# Patient Record
Sex: FEMALE | Race: OTHER | HISPANIC OR LATINO | URBAN - METROPOLITAN AREA
[De-identification: names, ages, dates, MRNs, and addresses within clinical notes are randomized per-mention and may not be internally consistent; named-entity substitution may affect disease eponyms.]

---

## 2017-01-03 ENCOUNTER — EMERGENCY (EMERGENCY)
Facility: HOSPITAL | Age: 42
LOS: 1 days | Discharge: HOME | End: 2017-01-03
Admitting: EMERGENCY MEDICINE
Payer: COMMERCIAL

## 2017-01-03 VITALS
SYSTOLIC BLOOD PRESSURE: 170 MMHG | DIASTOLIC BLOOD PRESSURE: 106 MMHG | HEART RATE: 93 BPM | OXYGEN SATURATION: 96 % | TEMPERATURE: 98 F | RESPIRATION RATE: 16 BRPM

## 2017-01-03 PROCEDURE — 99284 EMERGENCY DEPT VISIT MOD MDM: CPT | Mod: 25

## 2017-01-03 PROCEDURE — 93010 ELECTROCARDIOGRAM REPORT: CPT

## 2017-01-03 RX ORDER — IBUPROFEN 200 MG
1 TABLET ORAL
Qty: 15 | Refills: 0
Start: 2017-01-03 | End: 2017-01-08

## 2017-01-03 RX ORDER — CYCLOBENZAPRINE HYDROCHLORIDE 10 MG/1
1 TABLET, FILM COATED ORAL
Qty: 9 | Refills: 0
Start: 2017-01-03 | End: 2017-01-06

## 2017-01-03 NOTE — ED PROVIDER NOTE - MUSCULOSKELETAL [-], MLM
no swelling/no weakness/no neck pain/no sensory deficits/no gout/no stiffness/no back pain/no joint pain

## 2017-01-03 NOTE — ED PROVIDER NOTE - UPPER EXTREMITY EXAM, LEFT
TENDERNESS/L shoulder: TTP @ anterior aspect of shoulder. FROM, NVI, strength 5/5 b/l. No swelling, instability, discoloration, deformity

## 2017-01-03 NOTE — ED PROVIDER NOTE - OBJECTIVE STATEMENT
40 y/o female with PMH of HTN reports to the ER for left arm pain x 5-6 days. Patient states the pain first started about a week ago, after she slept on her left side. She says when she woke up, she had a throbbing pain at the top of her left shoulder which spreads into her left breast and radiates down the anterior aspect of her left arm. She describes the quality of pain as an intermittent throbbing and rates the pain as 5/10 severity. She says that nothing makes the pain worse, but hot showers and tylenol make the pain better. She says she self treated with one dose of Tylenol and experienced mild relief. She says this morning when she woke up at 5 am the pain returned and she became frightened, so she decided to come to the ER. She says the pain makes her very anxious and she is worried she might be having a heart attack or stroke. She admits to left arm pain. She denies chest pain, heart palpitations, heart murmurs, irregular rhythms, SOB, pain with respiration, orthopnea, paroxysmal nocturnal dyspnea, wheezing, cough, cyanosis, pallor, nipple pain, dischage/retraction/discoloration. 42 y/o female with PMH of HTN reports to the ER for left arm pain x 5-6 days. Patient states the pain first started about a week ago, after she slept on her left side. She says when she woke up, she had a throbbing pain at the top of her left shoulder which spreads into her left breast and radiates down the anterior aspect of her left arm. She describes the quality of pain as an intermittent throbbing and rates the pain as 5/10 severity. She says that nothing makes the pain worse, but hot showers and tylenol make the pain better. She says she self treated with one dose of Tylenol and experienced mild relief. She says this morning when she woke up at 5 am the pain returned and she became frightened, so she decided to come to the ER. She says the pain makes her very anxious and she is worried she might be having a heart attack or stroke. She admits to left arm pain. She denies chest pain, heart palpitations, heart murmurs, irregular rhythms, SOB, pain with respiration, orthopnea, paroxysmal nocturnal dyspnea, wheezing, cough, cyanosis, pallor, nipple pain, dischage/retraction/discoloration. Pt has not yet taken Losartan.

## 2017-01-03 NOTE — ED PROVIDER NOTE - CHPI ED SYMPTOMS NEG
no deformity/no abrasion/no joint swelling/no bruising no deformity/no abrasion/no numbness/no joint swelling/no back pain/no bruising/no inflammation

## 2017-01-03 NOTE — ED PROVIDER NOTE - MEDICAL DECISION MAKING DETAILS
42 yo F w/ 5 days L shoulder pain, intermittent. DDx: Cardiac etiology vs Musculoskeletal pain- most likely 2/2 Pt has normal EKG, heart score 1, pain reproducible with palpation and began after positional incident. Currently asymptomatic.

## 2017-01-03 NOTE — ED ADULT TRIAGE NOTE - CHIEF COMPLAINT QUOTE
Pt c/o intermittent L arm pain x1 week after sleeping on her couch. Pt states 3 days ago pain began radiating to L breast and chest area.  PMH: HTN - did not take meds today

## 2017-05-03 ENCOUNTER — EMERGENCY (EMERGENCY)
Facility: HOSPITAL | Age: 42
LOS: 1 days | Discharge: HOME | End: 2017-05-03
Attending: EMERGENCY MEDICINE | Admitting: EMERGENCY MEDICINE
Payer: COMMERCIAL

## 2017-05-03 VITALS
OXYGEN SATURATION: 96 % | RESPIRATION RATE: 16 BRPM | SYSTOLIC BLOOD PRESSURE: 154 MMHG | DIASTOLIC BLOOD PRESSURE: 104 MMHG | TEMPERATURE: 98 F | HEART RATE: 80 BPM

## 2017-05-03 VITALS
SYSTOLIC BLOOD PRESSURE: 190 MMHG | HEART RATE: 63 BPM | DIASTOLIC BLOOD PRESSURE: 100 MMHG | OXYGEN SATURATION: 100 % | RESPIRATION RATE: 17 BRPM

## 2017-05-03 LAB
ALBUMIN SERPL ELPH-MCNC: 4.3 G/DL — SIGNIFICANT CHANGE UP (ref 3.3–5)
ALP SERPL-CCNC: 80 U/L — SIGNIFICANT CHANGE UP (ref 40–120)
ALT FLD-CCNC: 19 U/L — SIGNIFICANT CHANGE UP (ref 4–33)
APTT BLD: 35 SEC — SIGNIFICANT CHANGE UP (ref 27.5–37.4)
AST SERPL-CCNC: 28 U/L — SIGNIFICANT CHANGE UP (ref 4–32)
BASOPHILS # BLD AUTO: 0.06 K/UL — SIGNIFICANT CHANGE UP (ref 0–0.2)
BASOPHILS NFR BLD AUTO: 0.7 % — SIGNIFICANT CHANGE UP (ref 0–2)
BILIRUB SERPL-MCNC: 0.4 MG/DL — SIGNIFICANT CHANGE UP (ref 0.2–1.2)
BUN SERPL-MCNC: 13 MG/DL — SIGNIFICANT CHANGE UP (ref 7–23)
CALCIUM SERPL-MCNC: 9.3 MG/DL — SIGNIFICANT CHANGE UP (ref 8.4–10.5)
CHLORIDE SERPL-SCNC: 99 MMOL/L — SIGNIFICANT CHANGE UP (ref 98–107)
CK MB BLD-MCNC: 1.76 NG/ML — SIGNIFICANT CHANGE UP (ref 1–4.7)
CK MB BLD-MCNC: 2 NG/ML — SIGNIFICANT CHANGE UP (ref 1–4.7)
CK MB BLD-MCNC: SIGNIFICANT CHANGE UP (ref 0–2.5)
CK SERPL-CCNC: 122 U/L — SIGNIFICANT CHANGE UP (ref 25–170)
CK SERPL-CCNC: 91 U/L — SIGNIFICANT CHANGE UP (ref 25–170)
CO2 SERPL-SCNC: 25 MMOL/L — SIGNIFICANT CHANGE UP (ref 22–31)
CREAT SERPL-MCNC: 0.84 MG/DL — SIGNIFICANT CHANGE UP (ref 0.5–1.3)
EOSINOPHIL # BLD AUTO: 0.16 K/UL — SIGNIFICANT CHANGE UP (ref 0–0.5)
EOSINOPHIL NFR BLD AUTO: 1.9 % — SIGNIFICANT CHANGE UP (ref 0–6)
GLUCOSE SERPL-MCNC: 96 MG/DL — SIGNIFICANT CHANGE UP (ref 70–99)
HCG SERPL-ACNC: < 5 MIU/ML — SIGNIFICANT CHANGE UP
HCT VFR BLD CALC: 41.4 % — SIGNIFICANT CHANGE UP (ref 34.5–45)
HGB BLD-MCNC: 13.4 G/DL — SIGNIFICANT CHANGE UP (ref 11.5–15.5)
IMM GRANULOCYTES NFR BLD AUTO: 0.2 % — SIGNIFICANT CHANGE UP (ref 0–1.5)
INR BLD: 1.03 — SIGNIFICANT CHANGE UP (ref 0.88–1.17)
LYMPHOCYTES # BLD AUTO: 1.07 K/UL — SIGNIFICANT CHANGE UP (ref 1–3.3)
LYMPHOCYTES # BLD AUTO: 12.5 % — LOW (ref 13–44)
MCHC RBC-ENTMCNC: 27.4 PG — SIGNIFICANT CHANGE UP (ref 27–34)
MCHC RBC-ENTMCNC: 32.4 % — SIGNIFICANT CHANGE UP (ref 32–36)
MCV RBC AUTO: 84.7 FL — SIGNIFICANT CHANGE UP (ref 80–100)
MONOCYTES # BLD AUTO: 0.51 K/UL — SIGNIFICANT CHANGE UP (ref 0–0.9)
MONOCYTES NFR BLD AUTO: 6 % — SIGNIFICANT CHANGE UP (ref 2–14)
NEUTROPHILS # BLD AUTO: 6.74 K/UL — SIGNIFICANT CHANGE UP (ref 1.8–7.4)
NEUTROPHILS NFR BLD AUTO: 78.7 % — HIGH (ref 43–77)
PLATELET # BLD AUTO: 362 K/UL — SIGNIFICANT CHANGE UP (ref 150–400)
PMV BLD: 10 FL — SIGNIFICANT CHANGE UP (ref 7–13)
POTASSIUM SERPL-MCNC: 4.1 MMOL/L — SIGNIFICANT CHANGE UP (ref 3.5–5.3)
POTASSIUM SERPL-SCNC: 4.1 MMOL/L — SIGNIFICANT CHANGE UP (ref 3.5–5.3)
PROT SERPL-MCNC: 7.7 G/DL — SIGNIFICANT CHANGE UP (ref 6–8.3)
PROTHROM AB SERPL-ACNC: 11.5 SEC — SIGNIFICANT CHANGE UP (ref 9.8–13.1)
RBC # BLD: 4.89 M/UL — SIGNIFICANT CHANGE UP (ref 3.8–5.2)
RBC # FLD: 13.2 % — SIGNIFICANT CHANGE UP (ref 10.3–14.5)
SODIUM SERPL-SCNC: 137 MMOL/L — SIGNIFICANT CHANGE UP (ref 135–145)
TROPONIN T SERPL-MCNC: < 0.06 NG/ML — SIGNIFICANT CHANGE UP (ref 0–0.06)
TROPONIN T SERPL-MCNC: < 0.06 NG/ML — SIGNIFICANT CHANGE UP (ref 0–0.06)
WBC # BLD: 8.56 K/UL — SIGNIFICANT CHANGE UP (ref 3.8–10.5)
WBC # FLD AUTO: 8.56 K/UL — SIGNIFICANT CHANGE UP (ref 3.8–10.5)

## 2017-05-03 PROCEDURE — 93010 ELECTROCARDIOGRAM REPORT: CPT

## 2017-05-03 PROCEDURE — 71020: CPT | Mod: 26

## 2017-05-03 PROCEDURE — 99285 EMERGENCY DEPT VISIT HI MDM: CPT | Mod: 25

## 2017-05-03 NOTE — ED PROVIDER NOTE - MEDICAL DECISION MAKING DETAILS
40 yo F here with epigastric pain x 7 days. hx GERD. improving with nexium until today which patient felt worse. PLAN: labs, ekg

## 2017-05-03 NOTE — ED ADULT NURSE REASSESSMENT NOTE - NS ED NURSE REASSESS COMMENT FT1
Report received from CELIA Alvarez. Pt A&Ox4, in NAD. Denies pain at this time. Pt awaiting Xray results & repeat Cardiac enzymes. Will continue to monitor closely.

## 2017-05-03 NOTE — ED PROVIDER NOTE - ATTENDING CONTRIBUTION TO CARE
Case of a 40 y/o female patient with past medical history of HTN, GERD here for epigastric pain x 1 week, sent by PMD to r/o ACS. Will do labs, give symptomatic treatment and monitor closely.

## 2017-05-03 NOTE — ED ADULT TRIAGE NOTE - CHIEF COMPLAINT QUOTE
Pt BIBA from home c/o several days of epigastric pain, worse when sitting. Pt denies SOB, denies N/V, states she feels like something will "come up", but nothing did. Saw PMD yesterday and was told she has GERD. Got 162mg ASA and Nitro by EMS. PMH of HTN

## 2017-05-03 NOTE — ED PROVIDER NOTE - PROGRESS NOTE DETAILS
DAVID Núñez: pt remaining comfortable in ED, pending CE #2 and repeat EKG at 4 hour ban. Pt aware of plan. Labs reassuring thus far. Pt will be signed out to overnight team
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
## 2017-05-03 NOTE — ED PROVIDER NOTE - CHPI ED SYMPTOMS NEG
no dysuria/no diarrhea/no nausea/no burning urination/no blood in stool/no vomiting/no abdominal distension/no fever/no hematuria/no chills
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
## 2017-05-03 NOTE — ED PROVIDER NOTE - GASTROINTESTINAL NEGATIVE STATEMENT, MLM
+epigastric pain, abdominal pain, no bloating, no constipation, no diarrhea, no nausea and no vomiting.

## 2017-05-04 PROBLEM — R73.03 PREDIABETES: Chronic | Status: ACTIVE | Noted: 2017-01-03

## 2018-03-27 ENCOUNTER — APPOINTMENT (OUTPATIENT)
Dept: DERMATOLOGY | Facility: CLINIC | Age: 43
End: 2018-03-27
Payer: COMMERCIAL

## 2018-03-27 VITALS
DIASTOLIC BLOOD PRESSURE: 88 MMHG | WEIGHT: 214.38 LBS | SYSTOLIC BLOOD PRESSURE: 160 MMHG | BODY MASS INDEX: 43.22 KG/M2 | HEIGHT: 59 IN

## 2018-03-27 DIAGNOSIS — L85.8 OTHER SPECIFIED EPIDERMAL THICKENING: ICD-10-CM

## 2018-03-27 DIAGNOSIS — L83 ACANTHOSIS NIGRICANS: ICD-10-CM

## 2018-03-27 DIAGNOSIS — L28.0 LICHEN SIMPLEX CHRONICUS: ICD-10-CM

## 2018-03-27 DIAGNOSIS — Z86.79 PERSONAL HISTORY OF OTHER DISEASES OF THE CIRCULATORY SYSTEM: ICD-10-CM

## 2018-03-27 DIAGNOSIS — L81.0 POSTINFLAMMATORY HYPERPIGMENTATION: ICD-10-CM

## 2018-03-27 DIAGNOSIS — L30.9 DERMATITIS, UNSPECIFIED: ICD-10-CM

## 2018-03-27 PROCEDURE — 99203 OFFICE O/P NEW LOW 30 MIN: CPT

## 2018-03-27 RX ORDER — TRIAMCINOLONE ACETONIDE 1 MG/G
0.1 OINTMENT TOPICAL
Qty: 1 | Refills: 1 | Status: ACTIVE | COMMUNITY
Start: 2018-03-27 | End: 1900-01-01

## 2018-03-27 RX ORDER — CALCIPOTRIENE 50 UG/G
0.01 OINTMENT TOPICAL
Refills: 0 | Status: ACTIVE | COMMUNITY

## 2018-07-14 ENCOUNTER — EMERGENCY (EMERGENCY)
Facility: HOSPITAL | Age: 43
LOS: 1 days | Discharge: HOME | End: 2018-07-14
Attending: EMERGENCY MEDICINE | Admitting: EMERGENCY MEDICINE
Payer: COMMERCIAL

## 2018-07-14 VITALS
RESPIRATION RATE: 12 BRPM | DIASTOLIC BLOOD PRESSURE: 109 MMHG | SYSTOLIC BLOOD PRESSURE: 195 MMHG | HEART RATE: 86 BPM | OXYGEN SATURATION: 100 %

## 2018-07-14 VITALS
TEMPERATURE: 98 F | RESPIRATION RATE: 16 BRPM | DIASTOLIC BLOOD PRESSURE: 135 MMHG | HEART RATE: 82 BPM | OXYGEN SATURATION: 99 % | SYSTOLIC BLOOD PRESSURE: 195 MMHG

## 2018-07-14 PROCEDURE — 99284 EMERGENCY DEPT VISIT MOD MDM: CPT

## 2018-07-14 PROCEDURE — 71046 X-RAY EXAM CHEST 2 VIEWS: CPT | Mod: 26

## 2018-07-14 RX ORDER — IBUPROFEN 200 MG
1 TABLET ORAL
Qty: 20 | Refills: 0
Start: 2018-07-14

## 2018-07-14 RX ORDER — IBUPROFEN 200 MG
600 TABLET ORAL ONCE
Qty: 0 | Refills: 0 | Status: COMPLETED | OUTPATIENT
Start: 2018-07-14 | End: 2018-07-14

## 2018-07-14 RX ORDER — CYCLOBENZAPRINE HYDROCHLORIDE 10 MG/1
1 TABLET, FILM COATED ORAL
Qty: 12 | Refills: 0
Start: 2018-07-14

## 2018-07-14 RX ORDER — ACETAMINOPHEN 500 MG
975 TABLET ORAL ONCE
Qty: 0 | Refills: 0 | Status: COMPLETED | OUTPATIENT
Start: 2018-07-14 | End: 2018-07-14

## 2018-07-14 RX ORDER — IBUPROFEN 200 MG
600 TABLET ORAL ONCE
Qty: 0 | Refills: 0 | Status: DISCONTINUED | OUTPATIENT
Start: 2018-07-14 | End: 2018-07-14

## 2018-07-14 RX ADMIN — Medication 600 MILLIGRAM(S): at 20:28

## 2018-07-14 RX ADMIN — Medication 975 MILLIGRAM(S): at 20:27

## 2018-07-14 NOTE — ED PROVIDER NOTE - PHYSICAL EXAMINATION
Gen: AAOx3, non-toxic, tearful during exam, states she is "very anxious"  Head: NCAT  HEENT: EOMI, oral mucosa moist, normal conjunctiva  Lung: CTAB, no respiratory distress, no wheezes/rhonchi/rales B/L, speaking in full sentences  CV: RRR, no murmurs, rubs or gallops  Abd: soft, NTND, no guarding  MSK: no visible deformities  Neuro: No focal sensory or motor deficits, +5/5 upper and lower ext strength, pt ambulating well without assistance  Skin: Warm, well perfused, no rash, no areas of ecchymosis on chest wall or abdomen  Psych: normal affect.   ~Real Ambrose M.D. Resident
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
## 2018-07-14 NOTE — ED PROVIDER NOTE - NS ED ROS FT
GENERAL: No fever or chills, EYES: no change in vision, HEENT: no trouble swallowing or speaking, CARDIAC: +chest discomfort, PULMONARY: no cough or SOB, GI: no abdominal pain, no nausea, no vomiting, no diarrhea or constipation, : No changes in urination, SKIN: no rashes, NEURO: no headache,  MSK: No joint pain ~Real Ambrose M.D. Resident
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
## 2018-11-19 NOTE — ED ADULT TRIAGE NOTE - MEANS OF ARRIVAL
ambulatory GEN: Well appearing, well nourished, awake, alert, oriented to person, place, time/situation and in no apparent distress.  ENT: Airway patent, Nasal mucosa clear. Mouth with normal mucosa. No oropharyngeal swelling or exudates, no neck masses palpated.   EYES: Clear bilaterally.  RESPIRATORY: Breathing comfortably with normal RR. No w/c/r. no resp distress.  CARDIAC: Regular rate and rhythm  ABDOMEN: Soft, nontender, +bowel sounds, no rebound, rigidity, or guarding.  MSK: Range of motion is not limited, no deformities noted.  NEURO: Alert and oriented, no focal deficits.  SKIN: Skin normal color for race, warm, dry and intact. No evidence of rash.  PSYCH: Alert and oriented to person, place, time/situation. normal mood and affect. no apparent risk to self or others.

## 2019-06-17 ENCOUNTER — EMERGENCY (EMERGENCY)
Facility: HOSPITAL | Age: 44
LOS: 1 days | Discharge: HOME | End: 2019-06-17
Attending: EMERGENCY MEDICINE
Payer: COMMERCIAL

## 2019-06-17 VITALS — HEART RATE: 102 BPM

## 2019-06-17 VITALS
HEART RATE: 122 BPM | WEIGHT: 210.1 LBS | SYSTOLIC BLOOD PRESSURE: 180 MMHG | DIASTOLIC BLOOD PRESSURE: 94 MMHG | TEMPERATURE: 102 F | OXYGEN SATURATION: 95 % | RESPIRATION RATE: 22 BRPM | HEIGHT: 58 IN

## 2019-06-17 LAB
ALBUMIN SERPL ELPH-MCNC: 4.6 G/DL — SIGNIFICANT CHANGE UP (ref 3.3–5)
ALP SERPL-CCNC: 89 U/L — SIGNIFICANT CHANGE UP (ref 40–120)
ALT FLD-CCNC: 20 U/L — SIGNIFICANT CHANGE UP (ref 10–45)
ANION GAP SERPL CALC-SCNC: 13 MMOL/L — SIGNIFICANT CHANGE UP (ref 5–17)
APPEARANCE UR: CLEAR — SIGNIFICANT CHANGE UP
APTT BLD: 34 SEC — SIGNIFICANT CHANGE UP (ref 27.5–36.3)
AST SERPL-CCNC: 17 U/L — SIGNIFICANT CHANGE UP (ref 10–40)
BASOPHILS # BLD AUTO: 0.1 K/UL — SIGNIFICANT CHANGE UP (ref 0–0.2)
BASOPHILS NFR BLD AUTO: 0.3 % — SIGNIFICANT CHANGE UP (ref 0–2)
BILIRUB SERPL-MCNC: 0.4 MG/DL — SIGNIFICANT CHANGE UP (ref 0.2–1.2)
BILIRUB UR-MCNC: NEGATIVE — SIGNIFICANT CHANGE UP
BUN SERPL-MCNC: 13 MG/DL — SIGNIFICANT CHANGE UP (ref 7–23)
CALCIUM SERPL-MCNC: 9 MG/DL — SIGNIFICANT CHANGE UP (ref 8.4–10.5)
CHLORIDE SERPL-SCNC: 95 MMOL/L — LOW (ref 96–108)
CO2 SERPL-SCNC: 24 MMOL/L — SIGNIFICANT CHANGE UP (ref 22–31)
COLOR SPEC: SIGNIFICANT CHANGE UP
CREAT SERPL-MCNC: 0.79 MG/DL — SIGNIFICANT CHANGE UP (ref 0.5–1.3)
DIFF PNL FLD: NEGATIVE — SIGNIFICANT CHANGE UP
EOSINOPHIL # BLD AUTO: 0.1 K/UL — SIGNIFICANT CHANGE UP (ref 0–0.5)
EOSINOPHIL NFR BLD AUTO: 0.3 % — SIGNIFICANT CHANGE UP (ref 0–6)
GAS PNL BLDV: SIGNIFICANT CHANGE UP
GLUCOSE SERPL-MCNC: 120 MG/DL — HIGH (ref 70–99)
GLUCOSE UR QL: NEGATIVE — SIGNIFICANT CHANGE UP
HCG UR QL: NEGATIVE — SIGNIFICANT CHANGE UP
HCT VFR BLD CALC: 41.8 % — SIGNIFICANT CHANGE UP (ref 34.5–45)
HGB BLD-MCNC: 14.2 G/DL — SIGNIFICANT CHANGE UP (ref 11.5–15.5)
INR BLD: 1.02 RATIO — SIGNIFICANT CHANGE UP (ref 0.88–1.16)
KETONES UR-MCNC: NEGATIVE — SIGNIFICANT CHANGE UP
LEUKOCYTE ESTERASE UR-ACNC: NEGATIVE — SIGNIFICANT CHANGE UP
LYMPHOCYTES # BLD AUTO: 0.8 K/UL — LOW (ref 1–3.3)
LYMPHOCYTES # BLD AUTO: 4 % — LOW (ref 13–44)
MCHC RBC-ENTMCNC: 28.7 PG — SIGNIFICANT CHANGE UP (ref 27–34)
MCHC RBC-ENTMCNC: 33.9 GM/DL — SIGNIFICANT CHANGE UP (ref 32–36)
MCV RBC AUTO: 84.4 FL — SIGNIFICANT CHANGE UP (ref 80–100)
MONOCYTES # BLD AUTO: 1.1 K/UL — HIGH (ref 0–0.9)
MONOCYTES NFR BLD AUTO: 5.3 % — SIGNIFICANT CHANGE UP (ref 2–14)
NEUTROPHILS # BLD AUTO: 18.1 K/UL — HIGH (ref 1.8–7.4)
NEUTROPHILS NFR BLD AUTO: 90 % — HIGH (ref 43–77)
NITRITE UR-MCNC: NEGATIVE — SIGNIFICANT CHANGE UP
PH UR: 6.5 — SIGNIFICANT CHANGE UP (ref 5–8)
PLATELET # BLD AUTO: 409 K/UL — HIGH (ref 150–400)
POTASSIUM SERPL-MCNC: 3.1 MMOL/L — LOW (ref 3.5–5.3)
POTASSIUM SERPL-SCNC: 3.1 MMOL/L — LOW (ref 3.5–5.3)
PROT SERPL-MCNC: 8.2 G/DL — SIGNIFICANT CHANGE UP (ref 6–8.3)
PROT UR-MCNC: NEGATIVE — SIGNIFICANT CHANGE UP
PROTHROM AB SERPL-ACNC: 11.6 SEC — SIGNIFICANT CHANGE UP (ref 10–12.9)
RAPID RVP RESULT: SIGNIFICANT CHANGE UP
RBC # BLD: 4.95 M/UL — SIGNIFICANT CHANGE UP (ref 3.8–5.2)
RBC # FLD: 13.1 % — SIGNIFICANT CHANGE UP (ref 10.3–14.5)
SODIUM SERPL-SCNC: 132 MMOL/L — LOW (ref 135–145)
SP GR SPEC: 1.02 — SIGNIFICANT CHANGE UP (ref 1.01–1.02)
UROBILINOGEN FLD QL: NEGATIVE — SIGNIFICANT CHANGE UP
WBC # BLD: 20.1 K/UL — HIGH (ref 3.8–10.5)
WBC # FLD AUTO: 20.1 K/UL — HIGH (ref 3.8–10.5)

## 2019-06-17 PROCEDURE — 93010 ELECTROCARDIOGRAM REPORT: CPT

## 2019-06-17 PROCEDURE — 82435 ASSAY OF BLOOD CHLORIDE: CPT

## 2019-06-17 PROCEDURE — 82947 ASSAY GLUCOSE BLOOD QUANT: CPT

## 2019-06-17 PROCEDURE — 81025 URINE PREGNANCY TEST: CPT

## 2019-06-17 PROCEDURE — 93005 ELECTROCARDIOGRAM TRACING: CPT

## 2019-06-17 PROCEDURE — 99284 EMERGENCY DEPT VISIT MOD MDM: CPT | Mod: 25

## 2019-06-17 PROCEDURE — 85730 THROMBOPLASTIN TIME PARTIAL: CPT

## 2019-06-17 PROCEDURE — 87086 URINE CULTURE/COLONY COUNT: CPT

## 2019-06-17 PROCEDURE — 71046 X-RAY EXAM CHEST 2 VIEWS: CPT

## 2019-06-17 PROCEDURE — 87486 CHLMYD PNEUM DNA AMP PROBE: CPT

## 2019-06-17 PROCEDURE — 87798 DETECT AGENT NOS DNA AMP: CPT

## 2019-06-17 PROCEDURE — 87040 BLOOD CULTURE FOR BACTERIA: CPT

## 2019-06-17 PROCEDURE — 84132 ASSAY OF SERUM POTASSIUM: CPT

## 2019-06-17 PROCEDURE — 87581 M.PNEUMON DNA AMP PROBE: CPT

## 2019-06-17 PROCEDURE — 81003 URINALYSIS AUTO W/O SCOPE: CPT

## 2019-06-17 PROCEDURE — 87184 SC STD DISK METHOD PER PLATE: CPT

## 2019-06-17 PROCEDURE — 80053 COMPREHEN METABOLIC PANEL: CPT

## 2019-06-17 PROCEDURE — 71046 X-RAY EXAM CHEST 2 VIEWS: CPT | Mod: 26

## 2019-06-17 PROCEDURE — 85610 PROTHROMBIN TIME: CPT

## 2019-06-17 PROCEDURE — 83605 ASSAY OF LACTIC ACID: CPT

## 2019-06-17 PROCEDURE — 96374 THER/PROPH/DIAG INJ IV PUSH: CPT

## 2019-06-17 PROCEDURE — 82803 BLOOD GASES ANY COMBINATION: CPT

## 2019-06-17 PROCEDURE — 82330 ASSAY OF CALCIUM: CPT

## 2019-06-17 PROCEDURE — 87633 RESP VIRUS 12-25 TARGETS: CPT

## 2019-06-17 PROCEDURE — 85027 COMPLETE CBC AUTOMATED: CPT

## 2019-06-17 PROCEDURE — 84295 ASSAY OF SERUM SODIUM: CPT

## 2019-06-17 PROCEDURE — 85014 HEMATOCRIT: CPT

## 2019-06-17 PROCEDURE — 87150 DNA/RNA AMPLIFIED PROBE: CPT

## 2019-06-17 RX ORDER — IBUPROFEN 200 MG
600 TABLET ORAL ONCE
Refills: 0 | Status: COMPLETED | OUTPATIENT
Start: 2019-06-17 | End: 2019-06-17

## 2019-06-17 RX ORDER — SODIUM CHLORIDE 9 MG/ML
3000 INJECTION INTRAMUSCULAR; INTRAVENOUS; SUBCUTANEOUS ONCE
Refills: 0 | Status: COMPLETED | OUTPATIENT
Start: 2019-06-17 | End: 2019-06-17

## 2019-06-17 RX ORDER — SODIUM CHLORIDE 9 MG/ML
1000 INJECTION INTRAMUSCULAR; INTRAVENOUS; SUBCUTANEOUS ONCE
Refills: 0 | Status: COMPLETED | OUTPATIENT
Start: 2019-06-17 | End: 2019-06-17

## 2019-06-17 RX ORDER — ACETAMINOPHEN 500 MG
975 TABLET ORAL ONCE
Refills: 0 | Status: COMPLETED | OUTPATIENT
Start: 2019-06-17 | End: 2019-06-17

## 2019-06-17 RX ORDER — CEFTRIAXONE 500 MG/1
1 INJECTION, POWDER, FOR SOLUTION INTRAMUSCULAR; INTRAVENOUS ONCE
Refills: 0 | Status: COMPLETED | OUTPATIENT
Start: 2019-06-17 | End: 2019-06-17

## 2019-06-17 RX ORDER — POTASSIUM CHLORIDE 20 MEQ
40 PACKET (EA) ORAL ONCE
Refills: 0 | Status: COMPLETED | OUTPATIENT
Start: 2019-06-17 | End: 2019-06-17

## 2019-06-17 RX ADMIN — Medication 600 MILLIGRAM(S): at 18:36

## 2019-06-17 RX ADMIN — SODIUM CHLORIDE 1000 MILLILITER(S): 9 INJECTION INTRAMUSCULAR; INTRAVENOUS; SUBCUTANEOUS at 19:28

## 2019-06-17 RX ADMIN — SODIUM CHLORIDE 3000 MILLILITER(S): 9 INJECTION INTRAMUSCULAR; INTRAVENOUS; SUBCUTANEOUS at 15:37

## 2019-06-17 RX ADMIN — Medication 40 MILLIEQUIVALENT(S): at 16:43

## 2019-06-17 RX ADMIN — Medication 600 MILLIGRAM(S): at 19:28

## 2019-06-17 RX ADMIN — Medication 100 MILLIGRAM(S): at 18:36

## 2019-06-17 RX ADMIN — Medication 975 MILLIGRAM(S): at 19:28

## 2019-06-17 RX ADMIN — Medication 975 MILLIGRAM(S): at 15:42

## 2019-06-17 RX ADMIN — CEFTRIAXONE 100 GRAM(S): 500 INJECTION, POWDER, FOR SOLUTION INTRAMUSCULAR; INTRAVENOUS at 16:47

## 2019-06-17 NOTE — ED PROVIDER NOTE - NS ED ROS FT
GENERAL: +fever/chills  EYES: no change in vision  HEENT: no trouble swallowing or speaking  CARDIAC: no chest pain or palpitations   PULMONARY: +cough with blood tinged sputum. no SOB  GI: no abdominal pain, nausea, vomiting, diarrhea, or constipation   : +small amount of blood in urine today. No other changes in urination  SKIN: no rashes  NEURO: +HA. no neck stiffness. no numbness, or weakness  MSK: No joint pain     ~Shankar Bond PGY1

## 2019-06-17 NOTE — ED PROVIDER NOTE - ATTENDING CONTRIBUTION TO CARE
attending Pollack: 43yF h/o HTN, GERD p/w fever, chills, runny nose, cough productive of blood tinged clear sputum, and gradual onset frontal headache x 1 day. Denies sick contacts or recent travel. No PE risk factors. On arrival, pt tachycardic, febrile, well appearing with clear lungs, no rash, abdomen soft/NT, no nuchal rigidity. Sepsis. Will obtain labs, IVF, empiric abx, cxr, UA, reassess

## 2019-06-17 NOTE — ED PROVIDER NOTE - NSFOLLOWUPINSTRUCTIONS_ED_ALL_ED_FT
Follow up with your PCP in 24-48 hours.   Drink plenty of water.   May take Tylenol and Motrin as directed on the bottle for pain control.   Return to the ER if you develop any new or worsening symptoms such as fever, neck stiffness, chest pain, shortness of breath, numbness, weakness, abdominal pain, nausea, vomiting, or visual changes.

## 2019-06-17 NOTE — ED PROVIDER NOTE - PHYSICAL EXAMINATION
Gen: AAOx3, non-toxic  Head: NCAT  HEENT: EOMI, oral mucosa moist, normal conjunctiva  Lung: CTAB, no respiratory distress, no wheezes/rhonchi/rales B/L, speaking in full sentences  CV: tachycardic, no murmurs, rubs or gallops  Abd: soft, NTND, no guarding, no CVA tenderness  MSK: no visible deformities  Neuro: no meningismus. No focal sensory or motor deficits, normal CN exam   Skin: Warm, well perfused, no rash  Psych: normal affect.     ~Shankar Bond PGY1

## 2019-06-17 NOTE — ED ADULT NURSE REASSESSMENT NOTE - NS ED NURSE REASSESS COMMENT FT1
pt pulled iv out of left hand upon getting out of bed to go to bathroom pt with dressing applied to left hand pt pending disposition

## 2019-06-17 NOTE — ED ADULT NURSE NOTE - OBJECTIVE STATEMENT
44 y/o female presents to er c/o nonproductive cough for the past week. States today she noted blood tinged sputum, fever and blood in her urine that she unsure if that's her period or not. Denies chest pain, sob, ha, n/v/d, abdominal pain, urinary symptoms. A&Ox4, tachycardic, febrile, skin warm dry and intact, MAEx4, lungs CTA, abd soft distended obese and nontender. Patient's bed in the lowest position, explained plan of care to patient and family members. Will continue to reassess. Code sepsis called for pt.

## 2019-06-17 NOTE — ED PROVIDER NOTE - CLINICAL SUMMARY MEDICAL DECISION MAKING FREE TEXT BOX
URI symptoms, ?hematuria, tachy, febrile. Well appearing, HD stable, not meningitic. Viral syndrome vs. PNA vs. UTI. Full septic workup. IVFs/Tylenol and reassess.

## 2019-06-17 NOTE — ED PROVIDER NOTE - OBJECTIVE STATEMENT
43F with PMH of HTN and GERD p/w fever, chills, runny nose, cough productive of blood tinged clear sputum, and gradual onset frontal headache since this morning. Denies any other symptoms including neck stiffness, SOB, chest pain, abdominal pain, N/V/D, dysuria, increased urinary frequency. No sick contacts, travel history, recent hospitalizations, TB exposures, or history of immunosuppression.

## 2019-06-17 NOTE — ED PROVIDER NOTE - PROGRESS NOTE DETAILS
Pt feeling much better, HR down to 100, educated on importance of oral hydration and sent abx to pharmacy, strict return precautions given.

## 2019-06-18 ENCOUNTER — INPATIENT (INPATIENT)
Facility: HOSPITAL | Age: 44
LOS: 2 days | Discharge: HOME | DRG: 871 | End: 2019-06-21
Attending: INTERNAL MEDICINE | Admitting: INTERNAL MEDICINE
Payer: COMMERCIAL

## 2019-06-18 VITALS
HEIGHT: 58 IN | OXYGEN SATURATION: 97 % | RESPIRATION RATE: 18 BRPM | HEART RATE: 105 BPM | DIASTOLIC BLOOD PRESSURE: 95 MMHG | WEIGHT: 210.1 LBS | TEMPERATURE: 98 F | SYSTOLIC BLOOD PRESSURE: 185 MMHG

## 2019-06-18 DIAGNOSIS — R50.81 FEVER PRESENTING WITH CONDITIONS CLASSIFIED ELSEWHERE: ICD-10-CM

## 2019-06-18 DIAGNOSIS — A40.3 SEPSIS DUE TO STREPTOCOCCUS PNEUMONIAE: ICD-10-CM

## 2019-06-18 DIAGNOSIS — R05 COUGH: ICD-10-CM

## 2019-06-18 DIAGNOSIS — R78.81 BACTEREMIA: ICD-10-CM

## 2019-06-18 DIAGNOSIS — D72.829 ELEVATED WHITE BLOOD CELL COUNT, UNSPECIFIED: ICD-10-CM

## 2019-06-18 DIAGNOSIS — J18.9 PNEUMONIA, UNSPECIFIED ORGANISM: ICD-10-CM

## 2019-06-18 DIAGNOSIS — J13 PNEUMONIA DUE TO STREPTOCOCCUS PNEUMONIAE: ICD-10-CM

## 2019-06-18 LAB
ALBUMIN SERPL ELPH-MCNC: 3.9 G/DL — SIGNIFICANT CHANGE UP (ref 3.3–5)
ALP SERPL-CCNC: 76 U/L — SIGNIFICANT CHANGE UP (ref 40–120)
ALT FLD-CCNC: 20 U/L — SIGNIFICANT CHANGE UP (ref 10–45)
ANION GAP SERPL CALC-SCNC: 12 MMOL/L — SIGNIFICANT CHANGE UP (ref 5–17)
AST SERPL-CCNC: 32 U/L — SIGNIFICANT CHANGE UP (ref 10–40)
BASOPHILS # BLD AUTO: 0.1 K/UL — SIGNIFICANT CHANGE UP (ref 0–0.2)
BASOPHILS NFR BLD AUTO: 0.4 % — SIGNIFICANT CHANGE UP (ref 0–2)
BILIRUB SERPL-MCNC: 0.3 MG/DL — SIGNIFICANT CHANGE UP (ref 0.2–1.2)
BUN SERPL-MCNC: 16 MG/DL — SIGNIFICANT CHANGE UP (ref 7–23)
CALCIUM SERPL-MCNC: 8.8 MG/DL — SIGNIFICANT CHANGE UP (ref 8.4–10.5)
CHLORIDE SERPL-SCNC: 101 MMOL/L — SIGNIFICANT CHANGE UP (ref 96–108)
CO2 SERPL-SCNC: 22 MMOL/L — SIGNIFICANT CHANGE UP (ref 22–31)
CREAT SERPL-MCNC: 0.8 MG/DL — SIGNIFICANT CHANGE UP (ref 0.5–1.3)
CULTURE RESULTS: SIGNIFICANT CHANGE UP
EOSINOPHIL # BLD AUTO: 0.1 K/UL — SIGNIFICANT CHANGE UP (ref 0–0.5)
EOSINOPHIL NFR BLD AUTO: 1.1 % — SIGNIFICANT CHANGE UP (ref 0–6)
GAS PNL BLDV: SIGNIFICANT CHANGE UP
GLUCOSE SERPL-MCNC: 124 MG/DL — HIGH (ref 70–99)
GRAM STN FLD: SIGNIFICANT CHANGE UP
HCT VFR BLD CALC: 38.7 % — SIGNIFICANT CHANGE UP (ref 34.5–45)
HGB BLD-MCNC: 12.9 G/DL — SIGNIFICANT CHANGE UP (ref 11.5–15.5)
LYMPHOCYTES # BLD AUTO: 0.8 K/UL — LOW (ref 1–3.3)
LYMPHOCYTES # BLD AUTO: 6.6 % — LOW (ref 13–44)
MAGNESIUM SERPL-MCNC: 2.3 MG/DL — SIGNIFICANT CHANGE UP (ref 1.6–2.6)
MCHC RBC-ENTMCNC: 28.6 PG — SIGNIFICANT CHANGE UP (ref 27–34)
MCHC RBC-ENTMCNC: 33.4 GM/DL — SIGNIFICANT CHANGE UP (ref 32–36)
MCV RBC AUTO: 85.7 FL — SIGNIFICANT CHANGE UP (ref 80–100)
METHOD TYPE: SIGNIFICANT CHANGE UP
MONOCYTES # BLD AUTO: 0.8 K/UL — SIGNIFICANT CHANGE UP (ref 0–0.9)
MONOCYTES NFR BLD AUTO: 6.2 % — SIGNIFICANT CHANGE UP (ref 2–14)
NEUTROPHILS # BLD AUTO: 11 K/UL — HIGH (ref 1.8–7.4)
NEUTROPHILS NFR BLD AUTO: 85.7 % — HIGH (ref 43–77)
PLATELET # BLD AUTO: 323 K/UL — SIGNIFICANT CHANGE UP (ref 150–400)
POTASSIUM SERPL-MCNC: 4.2 MMOL/L — SIGNIFICANT CHANGE UP (ref 3.5–5.3)
POTASSIUM SERPL-SCNC: 4.2 MMOL/L — SIGNIFICANT CHANGE UP (ref 3.5–5.3)
PROT SERPL-MCNC: 7.5 G/DL — SIGNIFICANT CHANGE UP (ref 6–8.3)
RBC # BLD: 4.51 M/UL — SIGNIFICANT CHANGE UP (ref 3.8–5.2)
RBC # FLD: 13.4 % — SIGNIFICANT CHANGE UP (ref 10.3–14.5)
S PNEUM DNA BLD POS QL NAA+NON-PROBE: SIGNIFICANT CHANGE UP
S PYO AG SPEC QL IA: NEGATIVE — SIGNIFICANT CHANGE UP
SODIUM SERPL-SCNC: 135 MMOL/L — SIGNIFICANT CHANGE UP (ref 135–145)
SPECIMEN SOURCE: SIGNIFICANT CHANGE UP
WBC # BLD: 12.9 K/UL — HIGH (ref 3.8–10.5)
WBC # FLD AUTO: 12.9 K/UL — HIGH (ref 3.8–10.5)

## 2019-06-18 PROCEDURE — 99255 IP/OBS CONSLTJ NEW/EST HI 80: CPT

## 2019-06-18 PROCEDURE — 99285 EMERGENCY DEPT VISIT HI MDM: CPT

## 2019-06-18 PROCEDURE — 71046 X-RAY EXAM CHEST 2 VIEWS: CPT | Mod: 26

## 2019-06-18 RX ORDER — HEPARIN SODIUM 5000 [USP'U]/ML
5000 INJECTION INTRAVENOUS; SUBCUTANEOUS EVERY 12 HOURS
Refills: 0 | Status: DISCONTINUED | OUTPATIENT
Start: 2019-06-18 | End: 2019-06-21

## 2019-06-18 RX ORDER — CEFTRIAXONE 500 MG/1
1000 INJECTION, POWDER, FOR SOLUTION INTRAMUSCULAR; INTRAVENOUS EVERY 24 HOURS
Refills: 0 | Status: DISCONTINUED | OUTPATIENT
Start: 2019-06-18 | End: 2019-06-21

## 2019-06-18 RX ORDER — CEFTRIAXONE 500 MG/1
1000 INJECTION, POWDER, FOR SOLUTION INTRAMUSCULAR; INTRAVENOUS ONCE
Refills: 0 | Status: COMPLETED | OUTPATIENT
Start: 2019-06-18 | End: 2019-06-18

## 2019-06-18 RX ORDER — ACETAMINOPHEN 500 MG
2 TABLET ORAL
Qty: 0 | Refills: 0 | DISCHARGE

## 2019-06-18 RX ORDER — SODIUM CHLORIDE 9 MG/ML
1000 INJECTION INTRAMUSCULAR; INTRAVENOUS; SUBCUTANEOUS
Refills: 0 | Status: DISCONTINUED | OUTPATIENT
Start: 2019-06-18 | End: 2019-06-21

## 2019-06-18 RX ORDER — SODIUM CHLORIDE 9 MG/ML
1000 INJECTION INTRAMUSCULAR; INTRAVENOUS; SUBCUTANEOUS ONCE
Refills: 0 | Status: COMPLETED | OUTPATIENT
Start: 2019-06-18 | End: 2019-06-18

## 2019-06-18 RX ORDER — LOSARTAN POTASSIUM 100 MG/1
50 TABLET, FILM COATED ORAL DAILY
Refills: 0 | Status: DISCONTINUED | OUTPATIENT
Start: 2019-06-18 | End: 2019-06-19

## 2019-06-18 RX ADMIN — HEPARIN SODIUM 5000 UNIT(S): 5000 INJECTION INTRAVENOUS; SUBCUTANEOUS at 19:14

## 2019-06-18 RX ADMIN — LOSARTAN POTASSIUM 50 MILLIGRAM(S): 100 TABLET, FILM COATED ORAL at 19:14

## 2019-06-18 RX ADMIN — CEFTRIAXONE 100 MILLIGRAM(S): 500 INJECTION, POWDER, FOR SOLUTION INTRAMUSCULAR; INTRAVENOUS at 15:23

## 2019-06-18 RX ADMIN — SODIUM CHLORIDE 1000 MILLILITER(S): 9 INJECTION INTRAMUSCULAR; INTRAVENOUS; SUBCUTANEOUS at 15:23

## 2019-06-18 NOTE — H&P ADULT - ASSESSMENT
pt w/ sepsis  bacteremia + bc w/ g pos / strep  iv abs as per id  ct chest  echo  htn  c/w meds  iv fluids  dvt proph

## 2019-06-18 NOTE — H&P ADULT - NSHPLABSRESULTS_GEN_ALL_CORE
12.9   12.9  )-----------( 323      ( 2019 15:24 )             38.7       18    135  |  101  |  16  ----------------------------<  124<H>  4.2   |  22  |  0.80    Ca    8.8      2019 15:24  Mg     2.3         TPro  7.5  /  Alb  3.9  /  TBili  0.3  /  DBili  x   /  AST  32  /  ALT  20  /  AlkPhos  76                Urinalysis Basic - ( 2019 16:47 )    Color: Light Yellow / Appearance: Clear / S.017 / pH: x  Gluc: x / Ketone: Negative  / Bili: Negative / Urobili: Negative   Blood: x / Protein: Negative / Nitrite: Negative   Leuk Esterase: Negative / RBC: x / WBC x   Sq Epi: x / Non Sq Epi: x / Bacteria: x        PT/INR - ( 2019 15:37 )   PT: 11.6 sec;   INR: 1.02 ratio         PTT - ( 2019 15:37 )  PTT:34.0 sec    Lactate Trend            CAPILLARY BLOOD GLUCOSE            Culture Results:   <10,000 CFU/mL Normal Urogenital Ruby ( @ 22:22)  Culture Results:   Growth in aerobic bottle: Gram Positive Cocci in Pairs and Chains  Growth in anaerobic bottle: Gram Positive Cocci in Pairs and Chains ( @ 17:56)  Culture Results:   Growth in aerobic bottle: Gram Positive Cocci in Pairs and Chains  "Due to technical problems, Proteus sp. will Not be reported as part of  the BCID panel until further notice"  ***Blood Panel PCR results on this specimen are available  approximately 3 hours after the Gram stain result.***  Gram stain, PCR, and/or culture results may not always  correspond due to difference in methodologies.  ************************************************************  This PCR assay was performed using [employer].  The following targets are tested for: Enterococcus,  vancomycin resistant enterococci, Listeria monocytogenes,  coagulase negative staphylococci, S. aureus,  methicillin resistant S. aureus, Streptococcus agalactiae  (Group B), S. pneumoniae, S. pyogenes (Group A),  Acinetobacter baumannii, Enterobacter cloacae, E. coli,  Klebsiella oxytoca, K. pneumoniae, Proteus sp.,  Serratia marcescens, Haemophilus influenzae,  Neisseria meningitidis, Pseudomonas aeruginosa, Candida  albicans, C.glabrata, C krusei, C parapsilosis,  C. tropicalis and the KPC resistance gene. ( @ 17:56)

## 2019-06-18 NOTE — ED ADULT NURSE REASSESSMENT NOTE - NS ED NURSE REASSESS COMMENT FT1
Pt AAOx4, NAD, resp nonlabored, resting comfortably in bed. Pt denies headache, dizziness, chest pain, palpitations, SOB, abd pain, n/v/d, urinary symptoms, fevers, chills, weakness at this time. Pt awaiting blood work results and admission. Safety maintained with call bell within reach.

## 2019-06-18 NOTE — H&P ADULT - HISTORY OF PRESENT ILLNESS
44y/o Femle  with PMH of HTN and GERD presents after called back for +blood culture (gram positive cocci in pairs/strep pneumo, no sensitivity yet).    Patient presented to ED yesterday for fever, chills, , cough productive of blood tinged clear sputum,   ..  Denies any other symptoms including neck stiffness, SOB, chest pain, abdominal pain, N/V/D, dysuria, increased urinary frequency. No sick contacts, travel history, recent hospitalizations, TB exposures, or history of immunosuppression.
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
## 2019-06-18 NOTE — ED PROVIDER NOTE - OBJECTIVE STATEMENT
43F with PMH of HTN and GERD presents after called back for +blood culture (gram positive cocci in pairs/strep pneumo, no sensitivity yet).  Patient presented to ED yesterday for fever, chills, runny nose, cough productive of blood tinged clear sputum, and gradual onset frontal headache since yesterday morning.  Was t/w ceftriaxone and sent home on doxycycline.  WBC in 20s. Initially tachycardic yesterday which improved after IVF and patient was feeling better so sent home.  Feeling better today but nervous because she was called back.  Denies any other symptoms including neck stiffness, SOB, chest pain, abdominal pain, N/V/D, dysuria, increased urinary frequency. No sick contacts, travel history, recent hospitalizations, TB exposures, or history of immunosuppression.

## 2019-06-18 NOTE — ED PROVIDER NOTE - PHYSICAL EXAMINATION
***GEN - anxious; NAD   ***HEAD - NC/AT  ***EYES/NOSE - PEERL, EOMI, mucous membranes moist, no discharge   ***THROAT: Oral cavity and pharynx normal. No inflammation, swelling, exudate, or lesions.    ***NECK: supple, non-tender no lymphadenopathy  ***PULMONARY - CTA b/l, symmetric breath sounds.   ***CARDIAC- s1s2, RRR, no murmur  ***ABDOMEN - +BS, ND, NT, soft, no guarding, no rebound, no organomegaly  ***BACK - no CVA tenderness, Normal  spine, no spinal TTP  ***EXTREMITIES - symmetric pulses, 2+ dp, capillary refill < 2 seconds, no clubbing, no cyanosis, no edema   ***SKIN - warm, dry, intact, no rash or bruising   ***NEUROLOGIC - a&o x3, CN 3-12 intact, sensation nl, motor 5/5

## 2019-06-18 NOTE — ED PROVIDER NOTE - DISPOSITION TYPE
"OT ACUTE Treatment Session    Pt seen on 10S nursing unit. Frequency Comments: M, T, F     Admitting complaint[de-identified] ST elevation myocardial infarction (STEMI), unspecified artery (CMS/HCC) [I21.3]                                                                                         Precautions  Other Precautions: Prefers to be called ""Hua\"" (11/06/17 1455)      ASSESSMENT:  Treatment today focused on neuro re-education for sitting balance, functional activity tolerance and simple command follow. Pt able to tolerate ~10min at edge of bed with min/mod A. Pt continues with poor command follow although Pt more alert during session with increased interactions with writer. Patient  displays  fair progress demonstrated by minimal increases in activity tolerance and overall cognition. Limitations at this time include weakness, fatigue, cognitive deficits, balance, medical status, carryover of techniques taught from one session to the next and deconditioning. Patient will benefit from further skilled OT for continued training with noted deficits to help the patient meet goal of maximizing independence. Discussed discharge planning options and therapy progress made to date with Patient and Nurse . RECOMMENDATIONS FOR DISCHARGE:  Recommendations for Discharge: OT: Sub-acute nursing home (11/09/17 1040)    OT Identified Barriers to Discharge: cognition, weakness, medical status     PT/OT Mobility Equipment for Discharge: continue to assess (11/08/17 1420)  PT/OT ADL Equipment for Discharge: continue to assess (11/09/17 1040)    ICU Mobility Assesment (PERME):         PLAN: Continue skilled OT, including the following Treatment Interventions: ADL retraining;Functional transfer training;UE strengthening/ROM; Cognitive reorientation;Patient/Family training; Compensatory technique education (11/09/17 1040)   Treatment Plan for Next Session: simple command following, " sitting tolerance EOB, grooming  Additional Plan Considerations: clarify PLOF       EDUCATION:   On this date, the patient was educated on benefits of activity. The response to education was: No evidence of learning                                                    SUBJECTIVE:     Subjective: Pt agreeable to session. (11/09/17 1040)  Subjective/Objective Comments: RN ok'd session. End of session Pt in bed with alarm activated and needs met. Call light within reach. (11/09/17 1040)    OBJECTIVE:Basic Lines: NG;Telemetry (11/09/17 1040)  Safety Measures: Alarms (11/09/17 1040)    RN reported Heydi Bharaths Fall Scale Score: 75       Last 24 hours of Functional Data     ADLs       Household mobility  Household Mobility  Supine to Sit: Maximal Assist (Max) (X1) (11/09/17 1040)  Sit to Supine: Total Assist - Non-dependent (mod/max x2) (11/09/17 1040)  Sitting - Static: Minimal Assist (Min) (11/09/17 1040)  Sitting - Dynamic: Moderate Assist (Mod) (11/09/17 1040)  Household Mobility Comments #1: Pt able to tolerate ~10min at edge of bed with Pt varying between min/mod A x1. Pt demonstrating increased spontaneous BUE AROM however unable to follow commands to use BUE for support in sitting. Postural control facilitation with emphasis on scapular retraction, neck extension. Pt unable to sustain upright posture after manual cues withheld. (11/09/17 1040)    Home Management       Tolerance  OT Activity Tolerance  Activity Tolerance: 1:2 Activity to rest (11/09/17 1040)  Activity Tolerance Comments: poor, limited by cognition, lethragy (11/09/17 1040)    Cognition  Communication/Cognition  Following Demonstrated Directions: Follows one step directions with increased time; Follows one step directions with repetition (11/09/17 1040)  Safety Judgement: Decreased awareness of need for assistance;Decreased awareness of need for safety (11/09/17 1040)  Awareness of Deficits: Not aware of deficits (11/09/17 1040)  Cognition Comments: Pt alert through duration of session. Pt able to follow one step commands with repetition 50% of session. Limited sustained attention and delayed processing time noted with activity. (11/09/17 1040)    Patient's Personal Goal: not stated (11/03/17 1455)    Therapy Goals:    Goals  Short Term Goals to Be Reviewed On: 11/10/17 (11/03/17 1455)  Short Term Goals Are The Same as Discharge Goals: No (11/03/17 1455)  Goal Agreement: Patient agrees with goals and treatment plan (11/03/17 1455)  Grooming Short Term Goal 1: supervision/set-up (11/03/17 1455)  Dressing Short Term Goal 1: UB dressing min A (11/03/17 1455)  Home Setting Transfer Short Term Goal 1: Patient will tolerate sitting EOB for 10 minutes with overall supervision (11/03/17 1455)  Goal Comments: adjust and update goals as appropriate (11/03/17 1455)        Total Treatment Time:  OT Time Spent: 45 minutes (11/09/17 1040)    See OT flowsheet for full details regarding the OT therapy provided. ADMIT

## 2019-06-18 NOTE — ED POST DISCHARGE NOTE - OTHER COMMUNICATION
6/19: Chart reviewed, patient returned back to ED for treatment and management of +BC. -Haylee Cedeno PA-C 6/19: Chart reviewed, patient returned back to [facility] for treatment and management of +BC. -Haylee Cedeno PA-C

## 2019-06-18 NOTE — ED ADULT NURSE NOTE - CHPI ED NUR SYMPTOMS NEG
no diarrhea/no decreased eating/drinking/no headache/no abdominal pain/no rash/no vomiting/no shortness of breath

## 2019-06-18 NOTE — ED ADULT NURSE NOTE - OBJECTIVE STATEMENT
44 y/o female presents to the ED from home for positive blood cultures from yesterday. Pt states presented yesterday with productive cough, sudden onset of fever, chills. Pt reports improvement in symptoms. Denies current fever/chills, n/v, weakness, abd pain, diarrhea/constipation, numbness/tingling, urinary s/s. Pt A&Ox3, in no respiratory distress, no CP, lungs CTA, pulses present, no accessory muscle use, cap refill <2, skin warm. PT safety maintained with family at bedside, call bell within reach and bed in the lowest position.

## 2019-06-18 NOTE — ED POST DISCHARGE NOTE - DETAILS
9/10/2018          Sobeida Camara  [address]  [place] [address]    Dear Oscar Major,       Here are the biopsy/pathology findings from your recent EGD (Upper  Endoscopy) :     reflux esophagitis - an inflammation of the esophagus due to GERD. Patient with cough and fevers.  GPCs x 2. Patient will need to return to the ER.  Attempted to contact patient, left voicemail to call back.  -James Thomason PA-C Spoke with patient and informed her of abnormal result.  Instructed patient to return to the ER for further management.  She expressed understanding and will come back to the ER ASAP.  -James Thomason PA-C

## 2019-06-18 NOTE — CONSULT NOTE ADULT - RS GEN PE MLT RESP DETAILS PC
respirations non-labored/breath sounds equal/no wheezes/clear to auscultation bilaterally/good air movement

## 2019-06-18 NOTE — CONSULT NOTE ADULT - ATTENDING COMMENTS
Ho Mayorga  Attending Physician   Division of Infectious Disease  Pager #[phone]  After 5pm/weekend or no response, call #[phone]

## 2019-06-18 NOTE — ED ADULT NURSE NOTE - CAS EDP DISCH DISPOSITION ADMI
ANXIETY    • Will report anxiety at manageable levels Progressing        BIRTH - VAGINAL/ SECTION    • Fetal and maternal status remain reassuring during the birth process Progressing        PAIN - ADULT    • Verbalizes/displays adequate comfort [facility] [place] [address]

## 2019-06-19 ENCOUNTER — TRANSCRIPTION ENCOUNTER (OUTPATIENT)
Age: 44
End: 2019-06-19

## 2019-06-19 DIAGNOSIS — A41.9 SEPSIS, UNSPECIFIED ORGANISM: ICD-10-CM

## 2019-06-19 DIAGNOSIS — J18.9 PNEUMONIA, UNSPECIFIED ORGANISM: ICD-10-CM

## 2019-06-19 DIAGNOSIS — R04.2 HEMOPTYSIS: ICD-10-CM

## 2019-06-19 LAB
HCT VFR BLD CALC: 39.9 % — SIGNIFICANT CHANGE UP (ref 34.5–45)
HGB BLD-MCNC: 12.5 G/DL — SIGNIFICANT CHANGE UP (ref 11.5–15.5)
HIV 1+2 AB+HIV1 P24 AG SERPL QL IA: SIGNIFICANT CHANGE UP
MCHC RBC-ENTMCNC: 26.7 PG — LOW (ref 27–34)
MCHC RBC-ENTMCNC: 31.3 GM/DL — LOW (ref 32–36)
MCV RBC AUTO: 85.3 FL — SIGNIFICANT CHANGE UP (ref 80–100)
PLATELET # BLD AUTO: 358 K/UL — SIGNIFICANT CHANGE UP (ref 150–400)
RBC # BLD: 4.68 M/UL — SIGNIFICANT CHANGE UP (ref 3.8–5.2)
RBC # FLD: 13.7 % — SIGNIFICANT CHANGE UP (ref 10.3–14.5)
TSH SERPL-MCNC: 3.31 UIU/ML — SIGNIFICANT CHANGE UP (ref 0.27–4.2)
WBC # BLD: 10.4 K/UL — SIGNIFICANT CHANGE UP (ref 3.8–10.5)
WBC # FLD AUTO: 10.4 K/UL — SIGNIFICANT CHANGE UP (ref 3.8–10.5)

## 2019-06-19 PROCEDURE — 71250 CT THORAX DX C-: CPT | Mod: 26

## 2019-06-19 PROCEDURE — 99232 SBSQ HOSP IP/OBS MODERATE 35: CPT

## 2019-06-19 PROCEDURE — 93010 ELECTROCARDIOGRAM REPORT: CPT

## 2019-06-19 RX ORDER — IPRATROPIUM/ALBUTEROL SULFATE 18-103MCG
3 AEROSOL WITH ADAPTER (GRAM) INHALATION EVERY 6 HOURS
Refills: 0 | Status: DISCONTINUED | OUTPATIENT
Start: 2019-06-19 | End: 2019-06-21

## 2019-06-19 RX ORDER — LOSARTAN POTASSIUM 100 MG/1
50 TABLET, FILM COATED ORAL
Refills: 0 | Status: DISCONTINUED | OUTPATIENT
Start: 2019-06-19 | End: 2019-06-21

## 2019-06-19 RX ADMIN — HEPARIN SODIUM 5000 UNIT(S): 5000 INJECTION INTRAVENOUS; SUBCUTANEOUS at 05:28

## 2019-06-19 RX ADMIN — Medication 3 MILLILITER(S): at 23:10

## 2019-06-19 RX ADMIN — Medication 3 MILLILITER(S): at 11:15

## 2019-06-19 RX ADMIN — LOSARTAN POTASSIUM 50 MILLIGRAM(S): 100 TABLET, FILM COATED ORAL at 05:28

## 2019-06-19 RX ADMIN — LOSARTAN POTASSIUM 50 MILLIGRAM(S): 100 TABLET, FILM COATED ORAL at 15:44

## 2019-06-19 RX ADMIN — CEFTRIAXONE 100 MILLIGRAM(S): 500 INJECTION, POWDER, FOR SOLUTION INTRAMUSCULAR; INTRAVENOUS at 15:44

## 2019-06-19 RX ADMIN — Medication 3 MILLILITER(S): at 17:36

## 2019-06-19 NOTE — DISCHARGE NOTE PROVIDER - NSDCCAREPROVSEEN_GEN_ALL_CORE_FT
[facility] Medicine, Advance PracticeTeam [facility] Medicine, Advance PracticeTeam  Base, Henrique Engle, Lucius Goyal, Placido Mayorga, Ho Nuneztttuparampil

## 2019-06-19 NOTE — DISCHARGE NOTE PROVIDER - CARE PROVIDERS DIRECT ADDRESSES
,DirectAddress_Unknown ,DirectAddress_Unknown,[email or web address] ,DirectAddress_Unknown,[email or web address],DirectAddress_Unknown

## 2019-06-19 NOTE — CONSULT NOTE ADULT - PROBLEM SELECTOR RECOMMENDATION 9
2nd to CAP  -CXR with RML PNA  -BC with strep pneumo, await sensitives   -ID following, on ceftriaxone  -Now afebrile  -F/u repeat BC
-from CAP  -ceftriaxone 1 gm iv q24  -f/u blood cx results   -f/u repeat blood cx also

## 2019-06-19 NOTE — DISCHARGE NOTE PROVIDER - PROVIDER TOKENS
PROVIDER:[TOKEN:[[id number]],FOLLOWUP:[1 week]] PROVIDER:[TOKEN:[[id number]],FOLLOWUP:[1 week]],PROVIDER:[TOKEN:[[id number]]] PROVIDER:[TOKEN:[[id number]],FOLLOWUP:[1 week]],PROVIDER:[TOKEN:[[id number]]],PROVIDER:[TOKEN:[[id number]]]

## 2019-06-19 NOTE — DISCHARGE NOTE PROVIDER - CARE PROVIDER_API CALL
Lucius Engle)  Cardiovascular Disease  [address]  [place] [address]  Phone: [phone]  Fax: [phone]  Follow Up Time: 1 week Lucius Engle (MD)  Cardiovascular Disease  [address]  [place] [address]  Phone: [phone]  Fax: [phone]  Follow Up Time: 1 week    Ho Mayorga; MBBS)  Infectious Disease; Internal Medicine  [address] [place] [address]  Phone: [phone]  Fax: [phone]  Follow Up Time: Lucius Engle)  Cardiovascular Disease  [address]  [place] [address]  Phone: [phone]  Fax: [phone]  Follow Up Time: 1 week    Ho Mayorga; MBBS)  Infectious Disease; Internal Medicine  [address] [place] [address]  Phone: [phone]  Fax: [phone]  Follow Up Time:     Placido Goyal)  Critical Care Medicine  [address]  [place] [address]  Phone: [phone]  Fax: [phone]  Follow Up Time:

## 2019-06-19 NOTE — DISCHARGE NOTE PROVIDER - HOSPITAL COURSE
43y Female PMH of HTN and GERD presents after called back for +blood culture (gram positive cocci in pairs) and RML infiltrate on CXR, confirmed on CT Chest, admitted with PNA & GPC bacteremia.

## 2019-06-19 NOTE — CONSULT NOTE ADULT - ASSESSMENT
44 y/o F with PMH of HTN, GERD. Presents to ED after being called back for +BC (strep pneumo). Initially presented to ED 6/17/19 with fevers, chills, cough with blood tinged sputum x1 day - CXR clear at that time. CXR now with RML PNA.
43F with PMH of HTN and GERD presents after called back for +blood culture with fever, leukocytosis, RML CAP, pneumococcal bacteremia/sepsis

## 2019-06-19 NOTE — CONSULT NOTE ADULT - PROBLEM SELECTOR RECOMMENDATION 2
CXR with RML PNA  -Pending CT chest  -Normoxic on RA  -Duoneb Q6hrs
-from PNA  -f/u blood cx  -pt agreeable to HIV test

## 2019-06-19 NOTE — PROVIDER CONTACT NOTE (OTHER) - ACTION/TREATMENT ORDERED:
50mg losartan to be given twice a day. Will continue to monitor patient and maintain patient safety.

## 2019-06-19 NOTE — CONSULT NOTE ADULT - SUBJECTIVE AND OBJECTIVE BOX
ALISIA PAREDESE 43y Female  MRN-[id number]    Patient is a 43y old  Female who presents with a chief complaint of bacteremia    HPI: 43F with PMH of HTN and GERD presents after called back for +blood culture (gram positive cocci in pairs/strep pneumo, no sensitivity yet).  Patient presented to ED yesterday for fever, chills, runny nose, cough productive of blood tinged clear sputum, and gradual onset frontal headache since yesterday morning.  Was t/w ceftriaxone and sent home on doxycycline.  WBC in 20s. Initially tachycardic yesterday which improved after IVF and patient was feeling better so sent home.  Feeling better today but nervous because she was called back.  Denies any other symptoms including neck stiffness, SOB, chest pain, abdominal pain, N/V/D, dysuria, increased urinary frequency. No sick contacts, travel history, recent hospitalizations, TB exposures, or history of immunosuppression.    No travel. Claims sick contacts at home - not sure if infection vs allergies    No recent abx use. No dental work.      PAST MEDICAL & SURGICAL HISTORY:  Pre-diabetes  History of hypertension  HTN (hypertension)  No significant past surgical history      Allergies    No Known Allergies    Intolerances        ANTIMICROBIALS:  CTX 1 gm iv x1 in ER    MEDICATIONS  (STANDING):      Social History  Smoking: no  Etoh: no  Drug use: no        FAMILY HISTORY: No pertinent family hx in relation to chief complaint       Vital Signs Last 24 Hrs  T(C): 36.9 (2019 15:56), Max: 38.2 (2019 17:33)  T(F): 98.4 (2019 15:56), Max: 100.8 (2019 17:33)  HR: 103 (2019 15:56) (102 - 115)  BP: 155/89 (2019 15:56) (155/89 - 185/95)  BP(mean): 119 (2019 19:55) (119 - 119)  RR: 17 (2019 15:56) (17 - 20)  SpO2: 97% (2019 15:56) (95% - 98%)    CBC Full  -  ( 2019 15:24 )  WBC Count : 12.9 K/uL  RBC Count : 4.51 M/uL  Hemoglobin : 12.9 g/dL  Hematocrit : 38.7 %  Platelet Count - Automated : 323 K/uL  Mean Cell Volume : 85.7 fl  Mean Cell Hemoglobin : 28.6 pg  Mean Cell Hemoglobin Concentration : 33.4 gm/dL  Auto Neutrophil # : 11.0 K/uL  Auto Lymphocyte # : 0.8 K/uL  Auto Monocyte # : 0.8 K/uL  Auto Eosinophil # : 0.1 K/uL  Auto Basophil # : 0.1 K/uL  Auto Neutrophil % : 85.7 %  Auto Lymphocyte % : 6.6 %  Auto Monocyte % : 6.2 %  Auto Eosinophil % : 1.1 %  Auto Basophil % : 0.4 %        135  |  101  |  16  ----------------------------<  124<H>  4.2   |  22  |  0.80    Ca    8.8      2019 15:24  Mg     2.3         TPro  7.5  /  Alb  3.9  /  TBili  0.3  /  DBili  x   /  AST  32  /  ALT  20  /  AlkPhos  76      LIVER FUNCTIONS - ( 2019 15:24 )  Alb: 3.9 g/dL / Pro: 7.5 g/dL / ALK PHOS: 76 U/L / ALT: 20 U/L / AST: 32 U/L / GGT: x           Urinalysis Basic - ( 2019 16:47 )    Color: Light Yellow / Appearance: Clear / S.017 / pH: x  Gluc: x / Ketone: Negative  / Bili: Negative / Urobili: Negative   Blood: x / Protein: Negative / Nitrite: Negative   Leuk Esterase: Negative / RBC: x / WBC x   Sq Epi: x / Non Sq Epi: x / Bacteria: x        MICROBIOLOGY:  .Urine  19   <10,000 CFU/mL Normal Urogenital Ruby  --  --      .Blood  19   Growth in aerobic bottle: Gram Positive Cocci in Pairs and Chains  Growth in anaerobic bottle: Gram Positive Cocci in Pairs and Chains  --  Blood Culture PCR  -  Streptococcus pneumoniae: Detec (19 @ 17:56)          Rapid RVP Result: NotDetec ( @ 15:37)      RADIOLOGY  < from: Xray Chest 2 Views PA/Lat (19 @ 15:04) >  The heart is normal in size. Right middle lobe pneumonia is present. The   left lung is clear.
HISTORY OF PRESENT ILLNESS: HPI:     43F with PMH of HTN and GERD normal cardiac w/u in our office 6/18 which included an echo and stress echo who now presents after called back for +blood culture (gram positive cocci in pairs/strep pneumo, no sensitivity yet).  Patient presented to ED yesterday for fever, chills, runny nose, cough productive of blood tinged clear sputum, and gradual onset frontal headache since yesterday morning.  Was t/w ceftriaxone and sent home on doxycycline.  WBC in 20s. Initially tachycardic yesterday which improved after IVF and patient was feeling better so sent home.  Feeling better today but nervous because she was called back.  Denies any other symptoms including neck stiffness, SOB, chest pain, abdominal pain, N/V/D, dysuria, increased urinary frequency. No sick contacts, travel history, recent hospitalizations, TB exposures, or history of immunosuppression.	      PAST MEDICAL & SURGICAL HISTORY:  Pre-diabetes  History of hypertension  HTN (hypertension)  No significant past surgical history          MEDICATIONS:  MEDICATIONS  (STANDING):  losartan 50 milliGRAM(s) Oral daily      Allergies    No Known Allergies    Intolerances        FAMILY HISTORY:    Non-contributary for premature coronary disease or sudden cardiac death    SOCIAL HISTORY:    [ X] Non-smoker  [ ] Smoker  [ ] Alcohol      REVIEW OF SYSTEMS:  [ ]chest pain  [  ]shortness of breath  [  ]palpitations  [  ]syncope  [ ]near syncope [ ]upper extremity weakness   [ ] lower extremity weakness  [  ]diplopia  [  ]altered mental status   [  ]fevers  [ ]chills [ ]nausea  [ ]vomitting  [  ]dysphagia    [ ]abdominal pain  [ ]melena  [ ]BRBPR    [  ]epistaxis  [  ]rash    [ ]lower extremity edema        [x ] All others negative	  [ ] Unable to obtain      LABS:	 	    CARDIAC MARKERS:                              12.9   12.9  )-----------( 323      ( 18 Jun 2019 15:24 )             38.7     Hb Trend: 12.9<--    06-18    135  |  101  |  16  ----------------------------<  124<H>  4.2   |  22  |  0.80    Ca    8.8      18 Jun 2019 15:24  Mg     2.3     06-18    TPro  7.5  /  Alb  3.9  /  TBili  0.3  /  DBili  x   /  AST  32  /  ALT  20  /  AlkPhos  76  06-18    Creatinine Trend: 0.80<--, 0.79<--      PHYSICAL EXAM:  T(C): 36.9 (06-18-19 @ 15:56), Max: 37.6 (06-17-19 @ 18:32)  HR: 103 (06-18-19 @ 15:56) (102 - 115)  BP: 155/89 (06-18-19 @ 15:56) (155/89 - 185/95)  RR: 17 (06-18-19 @ 15:56) (17 - 20)  SpO2: 97% (06-18-19 @ 15:56) (95% - 98%)  Wt(kg): --  I&O's Summary      Gen: Appears well in NAD  HEENT:  (-)icterus (-)pallor  CV: N S1 S2 1/6 JUSTIN (+)2 Pulses B/l  Resp:  Clear to ausculatation B/L, normal effort  GI: (+) BS Soft, NT, ND  Lymph:  (-)Edema, (-)obvious lymphadenopathy  Skin: Warm to touch, Normal turgor  Psych: Appropriate mood and affect  	      ECG:  	PENDING    RADIOLOGY:         CXR:  < from: Xray Chest 2 Views PA/Lat (06.18.19 @ 15:04) >  The heart is normal in size. Right middle lobe pneumonia is present. The   left lung is clear.          ASSESSMENT/PLAN: 	43y Female PMH of HTN and GERD normal cardiac w/u in our office 6/18 which included an echo and stress echo who now presents after called back for +blood culture (gram positive cocci in pairs) and RML infiltrate on CXR    - ID eval appreciated  - F/U cx and sensitivity  - no clinical CHF  - Cont home antihypertensives and monitor  - will follow with you    I once again thank you for allowing me to participate in the care of our mutual patient.  If you have any questions or concerns please do not hesitate to contact me.    Lucius Engle MD, [facility]  BEEPER [phone]
PULMONARY CONSULT    HPI: 42 y/o F with PMH of HTN, GERD. Presents to ED after being called back for +BC (strep pneumo). Initially presented to ED 19 with fevers, chills, cough with blood tinged sputum x1 day - CXR clear at that time. CXR now with RML PNA. Denies dyspnea, wheezing, CP, pleuritic CP, TB exposures.       PAST MEDICAL & SURGICAL HISTORY:  Pre-diabetes  History of hypertension  HTN (hypertension)  No significant past surgical history    Allergies  No Known Allergies    FAMILY HISTORY: lung ca grandparents (+smoker)    Social history: never a smoker    Review of Systems:  CONSTITUTIONAL: Per above  EYES: No eye pain, visual disturbances, or discharge  ENMT:  No difficulty hearing, tinnitus, vertigo; No sinus or throat pain  NECK: No pain or stiffness  RESPIRATORY: Per above  CARDIOVASCULAR: No chest pain, palpitations, dizziness, or leg swelling  GASTROINTESTINAL: No abdominal or epigastric pain. No nausea, vomiting, or hematemesis; No diarrhea or constipation. No melena or hematochezia.  GENITOURINARY: No dysuria, frequency, hematuria, or incontinence  NEUROLOGICAL: No headaches, memory loss, loss of strength, numbness, or tremors  SKIN: No itching, burning, rashes, or lesions   MUSCULOSKELETAL: No joint pain or swelling; No muscle, back, or extremity pain  PSYCHIATRIC: No depression, anxiety, mood swings, or difficulty sleeping      Medications:  MEDICATIONS  (STANDING):  ALBUTerol/ipratropium for Nebulization 3 milliLiter(s) Nebulizer every 6 hours  cefTRIAXone   IVPB 1000 milliGRAM(s) IV Intermittent every 24 hours  heparin  Injectable 5000 Unit(s) SubCutaneous every 12 hours  losartan 50 milliGRAM(s) Oral daily  sodium chloride 0.9%. 1000 milliLiter(s) (100 mL/Hr) IV Continuous <Continuous>      Vital Signs Last 24 Hrs  T(C): 36.8 (2019 05:14), Max: 37 (2019 18:57)  T(F): 98.3 (2019 05:14), Max: 98.6 (2019 18:57)  HR: 87 (2019 05:14) (87 - 107)  BP: 154/88 (2019 05:14) (154/88 - 185/95)  BP(mean): --  RR: 18 (2019 05:14) (17 - 18)  SpO2: 98% (2019 05:14) (97% - 98%)      VBG pH 7.43 18 @ 15:24  VBG pCO2 40 18 @ 15:24  VBG O2 sat 83 06-18 @ 15:24  VBG lactate 1.3  @ 15:24  VBG pH 7.40  @ 15:37  VBG pCO2 44  @ 15:37  VBG O2 sat 47  @ 15:37  VBG lactate 1.9 17 @ 15:37        0618 @ 07:01  -  06 @ 07:00  --------------------------------------------------------  IN: 240 mL / OUT: 0 mL / NET: 240 mL          LABS:                        12.9   12.9  )-----------( 323      ( 2019 15:24 )             38.7     06-18    135  |  101  |  16  ----------------------------<  124<H>  4.2   |  22  |  0.80    Ca    8.8      2019 15:24  Mg     2.3     18    TPro  7.5  /  Alb  3.9  /  TBili  0.3  /  DBili  x   /  AST  32  /  ALT  20  /  AlkPhos  76  0618        PT/INR - ( 2019 15:37 )   PT: 11.6 sec;   INR: 1.02 ratio         PTT - ( 2019 15:37 )  PTT:34.0 sec  Urinalysis Basic - ( 2019 16:47 )    Color: Light Yellow / Appearance: Clear / S.017 / pH: x  Gluc: x / Ketone: Negative  / Bili: Negative / Urobili: Negative   Blood: x / Protein: Negative / Nitrite: Negative   Leuk Esterase: Negative / RBC: x / WBC x   Sq Epi: x / Non Sq Epi: x / Bacteria: x          CULTURES:      (collected 19 @ 17:56)  Source: .Blood  Gram Stain (19 @ 06:12):    Growth in aerobic bottle: Gram Positive Cocci in Pairs and Chains  Preliminary Report (19 @ 06:12):    Growth in aerobic bottle: Gram Positive Cocci in Pairs and Chains    "Due to technical problems, Proteus sp. will Not be reported as part of    the BCID panel until further notice"    ***Blood Panel PCR results on this specimen are available    approximately 3 hours after the Gram stain result.***    Gram stain, PCR, and/or culture results may not always    correspond due to difference in methodologies.    ************************************************************    This PCR assay was performed using [facility].    The following targets are tested for: Enterococcus,    vancomycin resistant enterococci, Listeria monocytogenes,    coagulase negative staphylococci, S. aureus,    methicillin resistant S. aureus, Streptococcus agalactiae    (Group B), S. pneumoniae, S. pyogenes (Group A),    Acinetobacter baumannii, Enterobacter cloacae, E. coli,    Klebsiella oxytoca, K. pneumoniae, Proteus sp.,    Serratia marcescens, Haemophilus influenzae,    Neisseria meningitidis, Pseudomonas aeruginosa, Candida    albicans, C.glabrata, C krusei, C parapsilosis,    C. tropicalis and the KPC resistance gene.  Organism: Blood Culture PCR (19 @ 08:14)  Organism: Blood Culture PCR (19 @ 08:14)      -  Streptococcus pneumoniae: Detec      Method Type: PCR     (collected 19 @ 17:56)  Source: .Blood  Gram Stain (19 @ 08:25):    Growth in aerobic bottle: Gram Positive Cocci in Pairs and Chains    Growth in anaerobic bottle: Gram Positive Cocci in Pairs and Chains  Preliminary Report (19 @ 08:25):    Growth in aerobic bottle: Gram Positive Cocci in Pairs and Chains    Growth in anaerobic bottle: Gram Positive Cocci in Pairs and Chains        Culture - Urine (collected 19 @ 22:22)  Source: .Urine  Final Report (19 @ 14:23):    <10,000 CFU/mL Normal Urogenital Ruby          Physical Examination:    General: No acute distress.      HEENT: Pupils equal, reactive to light.  Symmetric.    PULM: Clear to auscultation bilaterally    CVS: RRR    ABD: Soft, nondistended, nontender, normoactive bowel sounds, no masses    EXT: No edema, nontender    SKIN: Warm and well perfused, no rashes noted.    NEURO: Alert, oriented, interactive, nonfocal    RADIOLOGY REVIEWED  CXR: RML pneumonia

## 2019-06-20 LAB
-  CEFTRIAXONE: SIGNIFICANT CHANGE UP
-  ERYTHROMYCIN: SIGNIFICANT CHANGE UP
-  LEVOFLOXACIN: SIGNIFICANT CHANGE UP
-  PENICILLIN: SIGNIFICANT CHANGE UP
-  VANCOMYCIN: SIGNIFICANT CHANGE UP
CULTURE RESULTS: SIGNIFICANT CHANGE UP
CULTURE RESULTS: SIGNIFICANT CHANGE UP
HBA1C BLD-MCNC: 5.7 % — HIGH (ref 4–5.6)
METHOD TYPE: SIGNIFICANT CHANGE UP
METHOD TYPE: SIGNIFICANT CHANGE UP
ORGANISM # SPEC MICROSCOPIC CNT: SIGNIFICANT CHANGE UP
SPECIMEN SOURCE: SIGNIFICANT CHANGE UP
SPECIMEN SOURCE: SIGNIFICANT CHANGE UP

## 2019-06-20 PROCEDURE — 99232 SBSQ HOSP IP/OBS MODERATE 35: CPT

## 2019-06-20 RX ADMIN — Medication 3 MILLILITER(S): at 23:06

## 2019-06-20 RX ADMIN — LOSARTAN POTASSIUM 50 MILLIGRAM(S): 100 TABLET, FILM COATED ORAL at 05:19

## 2019-06-20 RX ADMIN — HEPARIN SODIUM 5000 UNIT(S): 5000 INJECTION INTRAVENOUS; SUBCUTANEOUS at 05:19

## 2019-06-20 RX ADMIN — CEFTRIAXONE 100 MILLIGRAM(S): 500 INJECTION, POWDER, FOR SOLUTION INTRAMUSCULAR; INTRAVENOUS at 14:33

## 2019-06-20 RX ADMIN — Medication 3 MILLILITER(S): at 11:28

## 2019-06-20 RX ADMIN — Medication 3 MILLILITER(S): at 05:19

## 2019-06-20 RX ADMIN — Medication 3 MILLILITER(S): at 17:25

## 2019-06-20 RX ADMIN — LOSARTAN POTASSIUM 50 MILLIGRAM(S): 100 TABLET, FILM COATED ORAL at 17:26

## 2019-06-21 ENCOUNTER — TRANSCRIPTION ENCOUNTER (OUTPATIENT)
Age: 44
End: 2019-06-21

## 2019-06-21 VITALS
DIASTOLIC BLOOD PRESSURE: 103 MMHG | SYSTOLIC BLOOD PRESSURE: 156 MMHG | HEART RATE: 96 BPM | RESPIRATION RATE: 19 BRPM | OXYGEN SATURATION: 97 % | TEMPERATURE: 98 F

## 2019-06-21 PROCEDURE — 87081 CULTURE SCREEN ONLY: CPT

## 2019-06-21 PROCEDURE — 84295 ASSAY OF SERUM SODIUM: CPT

## 2019-06-21 PROCEDURE — 71250 CT THORAX DX C-: CPT

## 2019-06-21 PROCEDURE — 87389 HIV-1 AG W/HIV-1&-2 AB AG IA: CPT

## 2019-06-21 PROCEDURE — 82435 ASSAY OF BLOOD CHLORIDE: CPT

## 2019-06-21 PROCEDURE — 87040 BLOOD CULTURE FOR BACTERIA: CPT

## 2019-06-21 PROCEDURE — 85014 HEMATOCRIT: CPT

## 2019-06-21 PROCEDURE — 82330 ASSAY OF CALCIUM: CPT

## 2019-06-21 PROCEDURE — 84443 ASSAY THYROID STIM HORMONE: CPT

## 2019-06-21 PROCEDURE — 83605 ASSAY OF LACTIC ACID: CPT

## 2019-06-21 PROCEDURE — 96374 THER/PROPH/DIAG INJ IV PUSH: CPT

## 2019-06-21 PROCEDURE — 82803 BLOOD GASES ANY COMBINATION: CPT

## 2019-06-21 PROCEDURE — 83735 ASSAY OF MAGNESIUM: CPT

## 2019-06-21 PROCEDURE — 87880 STREP A ASSAY W/OPTIC: CPT

## 2019-06-21 PROCEDURE — 94640 AIRWAY INHALATION TREATMENT: CPT

## 2019-06-21 PROCEDURE — 84132 ASSAY OF SERUM POTASSIUM: CPT

## 2019-06-21 PROCEDURE — 83036 HEMOGLOBIN GLYCOSYLATED A1C: CPT

## 2019-06-21 PROCEDURE — 99285 EMERGENCY DEPT VISIT HI MDM: CPT | Mod: 25

## 2019-06-21 PROCEDURE — 93005 ELECTROCARDIOGRAM TRACING: CPT

## 2019-06-21 PROCEDURE — 80053 COMPREHEN METABOLIC PANEL: CPT

## 2019-06-21 PROCEDURE — 82947 ASSAY GLUCOSE BLOOD QUANT: CPT

## 2019-06-21 PROCEDURE — 99232 SBSQ HOSP IP/OBS MODERATE 35: CPT

## 2019-06-21 PROCEDURE — 71046 X-RAY EXAM CHEST 2 VIEWS: CPT

## 2019-06-21 PROCEDURE — 85027 COMPLETE CBC AUTOMATED: CPT

## 2019-06-21 RX ORDER — ALBUTEROL 90 UG/1
2 AEROSOL, METERED ORAL
Qty: 1 | Refills: 0
Start: 2019-06-21 | End: 2019-07-20

## 2019-06-21 RX ORDER — CEFUROXIME AXETIL 250 MG
1 TABLET ORAL
Qty: 14 | Refills: 0
Start: 2019-06-21 | End: 2019-06-27

## 2019-06-21 RX ORDER — ALBUTEROL 90 UG/1
2 AEROSOL, METERED ORAL EVERY 6 HOURS
Refills: 0 | Status: DISCONTINUED | OUTPATIENT
Start: 2019-06-21 | End: 2019-06-21

## 2019-06-21 RX ORDER — IPRATROPIUM/ALBUTEROL SULFATE 18-103MCG
1 AEROSOL WITH ADAPTER (GRAM) INHALATION
Refills: 0 | Status: DISCONTINUED | OUTPATIENT
Start: 2019-06-21 | End: 2019-06-21

## 2019-06-21 RX ORDER — IBUPROFEN 200 MG
2 TABLET ORAL
Qty: 0 | Refills: 0 | DISCHARGE

## 2019-06-21 RX ORDER — LOSARTAN POTASSIUM 100 MG/1
1 TABLET, FILM COATED ORAL
Qty: 0 | Refills: 0 | DISCHARGE
Start: 2019-06-21

## 2019-06-21 RX ORDER — HYDROCHLOROTHIAZIDE 25 MG
25 TABLET ORAL DAILY
Refills: 0 | Status: DISCONTINUED | OUTPATIENT
Start: 2019-06-21 | End: 2019-06-21

## 2019-06-21 RX ORDER — LOSARTAN POTASSIUM 100 MG/1
1 TABLET, FILM COATED ORAL
Qty: 0 | Refills: 0 | DISCHARGE

## 2019-06-21 RX ORDER — AMLODIPINE BESYLATE 2.5 MG/1
2.5 TABLET ORAL DAILY
Refills: 0 | Status: DISCONTINUED | OUTPATIENT
Start: 2019-06-21 | End: 2019-06-21

## 2019-06-21 RX ADMIN — HEPARIN SODIUM 5000 UNIT(S): 5000 INJECTION INTRAVENOUS; SUBCUTANEOUS at 05:02

## 2019-06-21 RX ADMIN — LOSARTAN POTASSIUM 50 MILLIGRAM(S): 100 TABLET, FILM COATED ORAL at 05:02

## 2019-06-21 RX ADMIN — Medication 25 MILLIGRAM(S): at 13:24

## 2019-06-21 RX ADMIN — Medication 3 MILLILITER(S): at 05:02

## 2019-06-21 NOTE — PROGRESS NOTE ADULT - NEGATIVE NEUROLOGICAL SYMPTOMS
no headache/no confusion/no difficulty walking

## 2019-06-21 NOTE — PROGRESS NOTE ADULT - PROBLEM SELECTOR PLAN 4
-better  -cont abx  -monitor temps

## 2019-06-21 NOTE — PROGRESS NOTE ADULT - GASTROINTESTINAL DETAILS
no rebound tenderness/no rigidity/soft/no distention/no guarding/nontender
nontender/no distention/no rebound tenderness/no rigidity/no guarding/soft
no rebound tenderness/no guarding/soft/nontender/no distention

## 2019-06-21 NOTE — PROGRESS NOTE ADULT - NEUROLOGICAL DETAILS
normal strength/alert and oriented x 3/responds to verbal commands
alert and oriented x 3/normal strength/responds to verbal commands
alert and oriented x 3/responds to verbal commands/normal strength

## 2019-06-21 NOTE — PROGRESS NOTE ADULT - ASSESSMENT
42 y/o F with PMH of HTN, GERD. Presents to ED after being called back for +BC (strep pneumo). Initially presented to ED 6/17/19 with fevers, chills, cough with blood tinged sputum x1 day - CXR clear at that time. CXR now with RML PNA.
44 y/o F with PMH of HTN, GERD. Presents to ED after being called back for +BC (strep pneumo). Initially presented to ED 6/17/19 with fevers, chills, cough with blood tinged sputum x1 day - CXR clear at that time. CXR now with RML PNA.
pt w/ sepsis  bacteremia + bc w/ g pos / strep  change to po abs   f/u bc neg  pulm eval noted  ct chest noted  htn  c/w meds  dvt proph   d/c planning  f/u w/ pmd and pulm as outpt
pt w/ sepsis  bacteremia + bc w/ g pos / strep  iv abs as per id  change to po abs in am   pulm eval noted  ct chest noted  htn  c/w meds  dvt proph   d/c planning in am
pt w/ sepsis  bacteremia + bc w/ g pos / strep  iv abs as per id  pulm eval   ct chest  echo  htn  c/w meds  iv fluids  dvt proph
43F with PMH of HTN and GERD presents after called back for +blood culture with fever, leukocytosis, RML CAP, pneumococcal bacteremia/sepsis

## 2019-06-21 NOTE — PROGRESS NOTE ADULT - PROBLEM SELECTOR PLAN 1
2nd to CAP  -CT RML PNA  -BC with strep pneumo  -ID following, on ceftriaxone  -Now afebrile  -Repeat BP with no growth to date
2nd to CAP  -CT RML PNA  -BC with strep pneumo  -As per ID, change to PO abx   -Now afebrile  -Repeat BP with no growth to date  -D/c planning as per primary team  -F/u outpatient, needs CXR in 1 month
-better  -cont CTX  -from PNA
-better  -cont CTX - change to po abx in AM  -from PNA
-better  -cont CTX - change to po abx   -from PNA

## 2019-06-21 NOTE — PROGRESS NOTE ADULT - RS GEN PE MLT RESP DETAILS PC
good air movement/respirations non-labored/clear to auscultation bilaterally
breath sounds equal/good air movement/clear to auscultation bilaterally/respirations non-labored
clear to auscultation bilaterally/good air movement/respirations non-labored

## 2019-06-21 NOTE — DISCHARGE NOTE NURSING/CASE MANAGEMENT/SOCIAL WORK - NSDCDPATPORTLINK_GEN_ALL_CORE
You can access the [email or web address] Patient Portal, offered by [facility], by registering with the following website: [email or web address]

## 2019-06-21 NOTE — PROGRESS NOTE ADULT - PROVIDER SPECIALTY LIST ADULT
Cardiology
Infectious Disease
Infectious Disease
Internal Medicine
Pulmonology
Cardiology
Cardiology
Pulmonology
Infectious Disease

## 2019-06-21 NOTE — PROGRESS NOTE ADULT - PROBLEM SELECTOR PLAN 2
CT chest RML PNA  -Normoxic on RA  -Duoneb Q6hrs.
CT chest RML PNA  -Normoxic on RA  -Pt without wheezing/SOB, can d/c Duoneb
-as above  -plan 7-10 day abx  -plan po abx on DC  -f/u sensitivities  -HIV negative
-as above  -plan 7-10 day abx - ceftin 500 mg po BID x 7 days for DC tomorrow   -HIV negative
-as above  -plan 7-10 day abx - ceftin 500 mg po BID x 7 days   -HIV negative

## 2019-06-21 NOTE — CHART NOTE - NSCHARTNOTEFT_GEN_A_CORE
CC: Elevated BP    Notified by nurse about elevated BP of 173/114. Pt is currently on losartan 50 mg BID, with last dose yesterday at 17:00, and next dose at 06:00. Pt asymptomatic; denies fevers/chills, headaches, dizziness, syncope, weakness, diaphoresis, chest pain, palpitations, SOB, abdominal pain, N/V/D.     Vital Signs Last 24 Hrs  T(C): 36.8 (21 Jun 2019 04:10), Max: 37.1 (20 Jun 2019 12:32)  T(F): 98.3 (21 Jun 2019 04:10), Max: 98.7 (20 Jun 2019 12:32)  HR: 82 (21 Jun 2019 04:10) (82 - 99)  BP: 173/114 (21 Jun 2019 04:10) (147/89 - 175/100)  RR: 18 (21 Jun 2019 04:10) (17 - 18)  SpO2: 99% (21 Jun 2019 04:10) (94% - 99%)    A/P  44y/o Female  with PMH of HTN and GERD presents after called back for +blood culture (gram positive cocci in pairs/strep pneumo, no sensitivity yet), and fever/chills, productive cough with blood tinged clear sputum. Now, patient has had elevated BP, and is being treated with losartan 50 mg BID. Pt asymptomatic    #Hypertension  -Give losartan 50 mg now  -Will discuss with cards      Tiana Hurd PA-C  #[id number]

## 2019-06-21 NOTE — PROGRESS NOTE ADULT - NEGATIVE OPHTHALMOLOGIC SYMPTOMS
no blurred vision L/no blurred vision R/no photophobia

## 2019-06-21 NOTE — PROGRESS NOTE ADULT - SUBJECTIVE AND OBJECTIVE BOX
S: Patient denies chest pain or SOB. Review of systems otherwise (-)  	    MEDICATIONS  (STANDING):  cefTRIAXone   IVPB 1000 milliGRAM(s) IV Intermittent every 24 hours  heparin  Injectable 5000 Unit(s) SubCutaneous every 12 hours  hydrochlorothiazide 25 milliGRAM(s) Oral daily  losartan 50 milliGRAM(s) Oral two times a day    MEDICATIONS  (PRN):  ALBUTerol    90 MICROgram(s) HFA Inhaler 2 Puff(s) Inhalation every 6 hours PRN Shortness of Breath and/or Wheezing  ALBUTerol/ipratropium (CFC free) Inhaler. 1 Puff(s) Inhalation four times a day PRN Shortness of Breath and/or Wheezing      LABS:        Hemoglobin: 12.5 g/dL (06-19 @ 09:48)  Hemoglobin: 12.9 g/dL (06-18 @ 15:24)  Hemoglobin: 14.2 g/dL (06-17 @ 15:37)          Creatinine Trend: 0.80<--, 0.79<--           PHYSICAL EXAM  Vital Signs Last 24 Hrs  T(C): 37.1 (21 Jun 2019 10:25), Max: 37.1 (20 Jun 2019 12:32)  T(F): 98.7 (21 Jun 2019 10:25), Max: 98.7 (20 Jun 2019 12:32)  HR: 93 (21 Jun 2019 11:16) (82 - 99)  BP: 168/112 (21 Jun 2019 11:16) (154/114 - 175/100)  BP(mean): --  RR: 19 (21 Jun 2019 10:25) (17 - 19)  SpO2: 95% (21 Jun 2019 10:25) (94% - 99%)      Gen: Appears well in NAD  HEENT:  (-)icterus (-)pallor  CV: N S1 S2 1/6 JUSTIN (+)2 Pulses B/l  Resp:  Clear to auscultation B/L, normal effort  GI: (+) BS Soft, NT, ND  Lymph:  (-)Edema, (-)obvious lymphadenopathy  Skin: Warm to touch, Normal turgor  Psych: Appropriate mood and affect      ECG: NSR    RADIOLOGY:         CXR:  < from: Xray Chest 2 Views PA/Lat (06.18.19 @ 15:04) >  The heart is normal in size. Right middle lobe pneumonia is present. The   left lung is clear.      ASSESSMENT/PLAN: 43y Female PMH of HTN and GERD normal cardiac w/u in our office 6/18 which included an echo and stress echo who now presents after called back for +blood culture (gram positive cocci in pairs) and RML infiltrate on CXR    - No clinical CHF or anginal symptoms  - Abx per ID  - Will add HCTZ 25mg daily for better BP control, continue losartan  - No inpatient cardiac w/u needed at this time  - D/c planning per primary team  - Patient to follow up in our [place] office ([address], [address]) with Dr. Crisostomo on 6/28 at 2:30 PM(Office #[phone])    Abel Uriostegui PA-C  [facility]  Pager: [phone]
S: SOB/cough improved. Patient denies chest pain. Review of systems otherwise (-)  	    MEDICATIONS  (STANDING):  ALBUTerol/ipratropium for Nebulization 3 milliLiter(s) Nebulizer every 6 hours  cefTRIAXone   IVPB 1000 milliGRAM(s) IV Intermittent every 24 hours  heparin  Injectable 5000 Unit(s) SubCutaneous every 12 hours  losartan 50 milliGRAM(s) Oral two times a day  sodium chloride 0.9%. 1000 milliLiter(s) (100 mL/Hr) IV Continuous <Continuous>    MEDICATIONS  (PRN):      LABS:                            12.5   10.40 )-----------( 358      ( 19 Jun 2019 09:48 )             39.9     Hemoglobin: 12.5 g/dL (06-19 @ 09:48)  Hemoglobin: 12.9 g/dL (06-18 @ 15:24)  Hemoglobin: 14.2 g/dL (06-17 @ 15:37)          Creatinine Trend: 0.80<--, 0.79<--           PHYSICAL EXAM  Vital Signs Last 24 Hrs  T(C): 37.1 (20 Jun 2019 12:32), Max: 37.1 (20 Jun 2019 12:32)  T(F): 98.7 (20 Jun 2019 12:32), Max: 98.7 (20 Jun 2019 12:32)  HR: 89 (20 Jun 2019 12:32) (89 - 99)  BP: 165/94 (20 Jun 2019 12:32) (147/89 - 168/98)  BP(mean): --  RR: 18 (20 Jun 2019 12:32) (18 - 18)  SpO2: 98% (20 Jun 2019 12:32) (97% - 99%)      Gen: Appears well in NAD  HEENT:  (-)icterus (-)pallor  CV: N S1 S2 1/6 JUSTIN (+)2 Pulses B/l  Resp:  Clear to auscultation B/L, normal effort  GI: (+) BS Soft, NT, ND  Lymph:  (-)Edema, (-)obvious lymphadenopathy  Skin: Warm to touch, Normal turgor  Psych: Appropriate mood and affect      ECG: NSR    RADIOLOGY:         CXR:  < from: Xray Chest 2 Views PA/Lat (06.18.19 @ 15:04) >  The heart is normal in size. Right middle lobe pneumonia is present. The   left lung is clear.      ASSESSMENT/PLAN: 43y Female PMH of HTN and GERD normal cardiac w/u in our office 6/18 which included an echo and stress echo who now presents after called back for +blood culture (gram positive cocci in pairs) and RML infiltrate on CXR    - No clinical CHF or anginal symptoms  - Abx per ID  - Cont home antihypertensives (losartan) and monitor BP trend  - will follow with you    Abel Uriostegui PA-C  [facility]  Pager: [phone]
CHIEF COMPLAINT:Patient is a 43y old  Female who presents with a chief complaint of   	        PAST MEDICAL & SURGICAL HISTORY:  Pre-diabetes  History of hypertension  HTN (hypertension)  No significant past surgical history          REVIEW OF SYSTEMS:  CONSTITUTIONAL: feels better  EYES: No eye pain, visual disturbances, or discharge  NECK: No pain or stiffness  RESPIRATORY:less cough   CARDIOVASCULAR: No chest pain, palpitations, passing out, dizziness, or leg swelling  GASTROINTESTINAL: No abdominal or epigastric pain. No nausea, vomiting, or hematemesis; No diarrhea or constipation. No melena or hematochezia.  GENITOURINARY: No dysuria, frequency, hematuria, or incontinence  NEUROLOGICAL: No headaches, memory loss, loss of strength, numbness, or tremors      Medications:  MEDICATIONS  (STANDING):  ALBUTerol/ipratropium for Nebulization 3 milliLiter(s) Nebulizer every 6 hours  cefTRIAXone   IVPB 1000 milliGRAM(s) IV Intermittent every 24 hours  heparin  Injectable 5000 Unit(s) SubCutaneous every 12 hours  losartan 50 milliGRAM(s) Oral daily  sodium chloride 0.9%. 1000 milliLiter(s) (100 mL/Hr) IV Continuous <Continuous>    MEDICATIONS  (PRN):    	    PHYSICAL EXAM:  T(C): 37 (06-19-19 @ 08:00), Max: 37 (06-18-19 @ 18:57)  HR: 92 (06-19-19 @ 08:00) (87 - 107)  BP: 165/109 (06-19-19 @ 08:00) (154/88 - 185/95)  RR: 18 (06-19-19 @ 08:00) (17 - 18)  SpO2: 98% (06-19-19 @ 08:00) (97% - 98%)  Wt(kg): --  I&O's Summary    18 Jun 2019 07:01  -  19 Jun 2019 07:00  --------------------------------------------------------  IN: 240 mL / OUT: 0 mL / NET: 240 mL        Appearance: Normal	  HEENT:   Normal oral mucosa, PERRL, EOMI	  Lymphatic: No lymphadenopathy  Cardiovascular: Normal S1 S2, No JVD, No murmurs, No edema  Respiratory: dec bs   Psychiatry: A & O x 3, Mood & affect appropriate  Gastrointestinal:  Soft, Non-tender, + BS	  Skin: No rashes, No ecchymoses, No cyanosis	  Neurologic: Non-focal  Extremities: Normal range of motion, No clubbing, cyanosis or edema  Vascular: Peripheral pulses palpable 2+ bilaterally    TELEMETRY: 	    ECG:  	  RADIOLOGY:  OTHER: 	  	  LABS:	 	    CARDIAC MARKERS:                                12.5   10.40 )-----------( 358      ( 19 Jun 2019 09:48 )             39.9     06-18    135  |  101  |  16  ----------------------------<  124<H>  4.2   |  22  |  0.80    Ca    8.8      18 Jun 2019 15:24  Mg     2.3     06-18    TPro  7.5  /  Alb  3.9  /  TBili  0.3  /  DBili  x   /  AST  32  /  ALT  20  /  AlkPhos  76  06-18    proBNP:   Lipid Profile:   HgA1c:   TSH:
CHIEF COMPLAINT:Patient is a 43y old  Female who presents with a chief complaint of pna (19 Jun 2019 08:24)    	        PAST MEDICAL & SURGICAL HISTORY:  Pre-diabetes  History of hypertension  HTN (hypertension)  No significant past surgical history          REVIEW OF SYSTEMS:  CONSTITUTIONAL: No fever, weight loss, or fatigue  EYES: No eye pain, visual disturbances, or discharge  NECK: No pain or stiffness  RESPIRATORY: No cough, wheezing, chills or hemoptysis; No Shortness of Breath  CARDIOVASCULAR: No chest pain, palpitations, passing out, dizziness, or leg swelling  GASTROINTESTINAL: No abdominal or epigastric pain. No nausea, vomiting, or hematemesis; No diarrhea or constipation. No melena or hematochezia.  GENITOURINARY: No dysuria, frequency, hematuria, or incontinence  NEUROLOGICAL: No headaches, memory loss, loss of strength, numbness, or tremors  SKIN: No itching, burning, rashes, or lesions   LYMPH Nodes: No enlarged glands  ENDOCRINE: No heat or cold intolerance; No hair loss  MUSCULOSKELETAL: No joint pain or swelling; No muscle, back, or extremity pain    Medications:  MEDICATIONS  (STANDING):  ALBUTerol/ipratropium for Nebulization 3 milliLiter(s) Nebulizer every 6 hours  cefTRIAXone   IVPB 1000 milliGRAM(s) IV Intermittent every 24 hours  heparin  Injectable 5000 Unit(s) SubCutaneous every 12 hours  losartan 50 milliGRAM(s) Oral two times a day    MEDICATIONS  (PRN):    	    PHYSICAL EXAM:  T(C): 37.1 (06-21-19 @ 10:25), Max: 37.1 (06-20-19 @ 12:32)  HR: 96 (06-21-19 @ 10:25) (82 - 99)  BP: 159/98 (06-21-19 @ 10:25) (154/114 - 175/100)  RR: 19 (06-21-19 @ 10:25) (17 - 19)  SpO2: 95% (06-21-19 @ 10:25) (94% - 99%)  Wt(kg): --  I&O's Summary    20 Jun 2019 07:01  -  21 Jun 2019 07:00  --------------------------------------------------------  IN: 240 mL / OUT: 0 mL / NET: 240 mL        Appearance: Normal	  HEENT:   Normal oral mucosa, PERRL, EOMI	  Lymphatic: No lymphadenopathy  Cardiovascular: Normal S1 S2, No JVD, No murmurs, No edema  Respiratory: Lungs clear to auscultation	  Psychiatry: A & O x 3, Mood & affect appropriate  Gastrointestinal:  Soft, Non-tender, + BS	  Skin: No rashes, No ecchymoses, No cyanosis	  Neurologic: Non-focal  Extremities: Normal range of motion, No clubbing, cyanosis or edema  Vascular: Peripheral pulses palpable 2+ bilaterally    TELEMETRY: 	    ECG:  	  RADIOLOGY:  OTHER: 	  	  LABS:	 	    CARDIAC MARKERS:                  proBNP:   Lipid Profile:   HgA1c:   TSH:
CHIEF COMPLAINT:Patient is a 43y old  Female who presents with a chief complaint of pna (19 Jun 2019 08:24)    	        PAST MEDICAL & SURGICAL HISTORY:  Pre-diabetes  History of hypertension  HTN (hypertension)  No significant past surgical history          REVIEW OF SYSTEMS:  CONSTITUTIONAL: feels better  EYES: No eye pain, visual disturbances, or discharge  NECK: No pain or stiffness  RESPIRATORY:dec cough  CARDIOVASCULAR: No chest pain, palpitations, passing out, dizziness, or leg swelling  GASTROINTESTINAL: No abdominal or epigastric pain. No nausea, vomiting, or hematemesis; No diarrhea or constipation. No melena or hematochezia.  GENITOURINARY: No dysuria, frequency, hematuria, or incontinence  NEUROLOGICAL: No headaches, memory loss, loss of strength, numbness, or tremors  SKIN: No itching, burning, rashes, or lesions   LYMPH Nodes: No enlarged glands  ENDOCRINE: No heat or cold intolerance; No hair loss  MUSCULOSKELETAL: No joint pain or swelling; No muscle, back, or extremity pain    Medications:  MEDICATIONS  (STANDING):  ALBUTerol/ipratropium for Nebulization 3 milliLiter(s) Nebulizer every 6 hours  cefTRIAXone   IVPB 1000 milliGRAM(s) IV Intermittent every 24 hours  heparin  Injectable 5000 Unit(s) SubCutaneous every 12 hours  losartan 50 milliGRAM(s) Oral two times a day  sodium chloride 0.9%. 1000 milliLiter(s) (100 mL/Hr) IV Continuous <Continuous>    MEDICATIONS  (PRN):    	    PHYSICAL EXAM:  T(C): 37.1 (06-20-19 @ 12:32), Max: 37.1 (06-20-19 @ 12:32)  HR: 89 (06-20-19 @ 12:32) (89 - 99)  BP: 165/94 (06-20-19 @ 12:32) (147/89 - 168/98)  RR: 18 (06-20-19 @ 12:32) (18 - 18)  SpO2: 98% (06-20-19 @ 12:32) (97% - 99%)  Wt(kg): --  I&O's Summary    19 Jun 2019 07:01  -  20 Jun 2019 07:00  --------------------------------------------------------  IN: 220 mL / OUT: 0 mL / NET: 220 mL    20 Jun 2019 07:01  -  20 Jun 2019 16:14  --------------------------------------------------------  IN: 240 mL / OUT: 0 mL / NET: 240 mL        Appearance: Normal	  HEENT:   Normal oral mucosa, PERRL, EOMI	  Lymphatic: No lymphadenopathy  Cardiovascular: Normal S1 S2, No JVD, No murmurs, No edema  Respiratory:dec bs   Psychiatry: A & O x 3, Mood & affect appropriate  Gastrointestinal:  Soft, Non-tender, + BS	  Skin: No rashes, No ecchymoses, No cyanosis	  Neurologic: Non-focal  Extremities: Normal range of motion, No clubbing, cyanosis or edema  Vascular: Peripheral pulses palpable 2+ bilaterally    TELEMETRY: 	    ECG:  	  RADIOLOGY:  OTHER: 	  	  LABS:	 	    CARDIAC MARKERS:                                12.5   10.40 )-----------( 358      ( 19 Jun 2019 09:48 )             39.9           proBNP:   Lipid Profile:   HgA1c: Hemoglobin A1C, Whole Blood: 5.7 % (06-20 @ 09:17)    TSH:
Follow-up Pulm Progress Note    No new respiratory events overnight.  Denies SOB/CP.   Cough improving  Sats 98% RA    Medications:  MEDICATIONS  (STANDING):  cefTRIAXone   IVPB 1000 milliGRAM(s) IV Intermittent every 24 hours  heparin  Injectable 5000 Unit(s) SubCutaneous every 12 hours  hydrochlorothiazide 25 milliGRAM(s) Oral daily  losartan 50 milliGRAM(s) Oral two times a day    MEDICATIONS  (PRN):  ALBUTerol    90 MICROgram(s) HFA Inhaler 2 Puff(s) Inhalation every 6 hours PRN Shortness of Breath and/or Wheezing  ALBUTerol/ipratropium (CFC free) Inhaler. 1 Puff(s) Inhalation four times a day PRN Shortness of Breath and/or Wheezing          Vital Signs Last 24 Hrs  T(C): 37.1 (21 Jun 2019 10:25), Max: 37.1 (20 Jun 2019 12:32)  T(F): 98.7 (21 Jun 2019 10:25), Max: 98.7 (20 Jun 2019 12:32)  HR: 93 (21 Jun 2019 11:16) (82 - 99)  BP: 168/112 (21 Jun 2019 11:16) (154/114 - 175/100)  BP(mean): --  RR: 19 (21 Jun 2019 10:25) (17 - 19)  SpO2: 95% (21 Jun 2019 10:25) (94% - 99%)          06-20 @ 07:01  -  06-21 @ 07:00  --------------------------------------------------------  IN: 240 mL / OUT: 0 mL / NET: 240 mL        CULTURES:     Culture - Blood (collected 06-18-19 @ 20:40)  Source: .Blood  Preliminary Report (06-19-19 @ 21:01):    No growth to date.    Culture - Blood (collected 06-18-19 @ 20:40)  Source: .Blood  Preliminary Report (06-19-19 @ 21:01):    No growth to date.    Culture - Blood (collected 06-17-19 @ 17:56)  Source: .Blood  Gram Stain (06-18-19 @ 06:12):    Growth in aerobic bottle: Gram Positive Cocci in Pairs and Chains  Final Report (06-20-19 @ 12:06):    Growth in aerobic bottle: Streptococcus pneumoniae    "Due to technical problems, Proteus sp. will Not be reported as part of    the BCID panel until further notice"    ***Blood Panel PCR results on this specimen are available    approximately 3 hours after the Gram stain result.***    Gram stain, PCR, and/or culture results may not always    correspond due to difference in methodologies.    ************************************************************    This PCR assay was performed using [employer].    The following targets are tested for: Enterococcus,    vancomycin resistant enterococci, Listeria monocytogenes,    coagulase negative staphylococci, S. aureus,    methicillin resistant S. aureus, Streptococcus agalactiae    (Group B), S. pneumoniae, S. pyogenes (Group A),    Acinetobacter baumannii, Enterobacter cloacae, E. coli,    Klebsiella oxytoca, K. pneumoniae, Proteus sp.,    Serratia marcescens, Haemophilus influenzae,    Neisseria meningitidis, Pseudomonas aeruginosa, Candida    albicans, C. glabrata, C krusei, C parapsilosis,    C. tropicalis and the KPC resistance gene.  Organism: Blood Culture PCR  Streptococcus pneumoniae (06-20-19 @ 12:06)  Organism: Streptococcus pneumoniae (06-20-19 @ 12:06)      -  Ceftriaxone: See note 0.023      -  Penicillin: See note 0.016      Method Type: ETEST  Organism: Streptococcus pneumoniae (06-20-19 @ 12:06)      -  Erythromycin: S Predicts results for azithromycin.      -  Levofloxacin: S      -  Vancomycin: S      Method Type: KB  Organism: Blood Culture PCR (06-20-19 @ 12:06)      -  Streptococcus pneumoniae: Detec      Method Type: PCR    Culture - Blood (collected 06-17-19 @ 17:56)  Source: .Blood  Gram Stain (06-18-19 @ 08:25):    Growth in aerobic bottle: Gram Positive Cocci in Pairs and Chains    Growth in anaerobic bottle: Gram Positive Cocci in Pairs and Chains  Final Report (06-20-19 @ 07:37):    Growth in aerobic and anaerobic bottles: Streptococcus pneumoniae    See previous culture [id number]        Culture - Urine (collected 06-17-19 @ 22:22)  Source: .Urine  Final Report (06-18-19 @ 14:23):    <10,000 CFU/mL Normal Urogenital Ruby        Physical Examination:  PULM: Clear to auscultation bilaterally, no significant sputum production  CVS: RRR    RADIOLOGY REVIEWED  CT chest: < from: CT Chest No Cont (06.19.19 @ 09:18) >  No hilar and/or mediastinal adenopathy is noted.     Heart is enlarged in size. No pericardial effusion is noted.     No obvious lesion is noted at the orifice of the right middle lobe   bronchus. Evaluation of the distal portion of the right middle lobe   bronchus is limited. The remaining tracheobronchial tree is patent.   Groundglass opacities/consolidation are noted within the right middle   lobe. No pleural effusions are noted.    Below the diaphragm, visualized portions of the abdomen demonstrate   low-attenuation lesion in the right kidney which is indeterminate based   on this exam.     Visualized osseous structures are within normal limits.    Impression: Groundglass opacities/consolidation are noted within the  right middle lobe. Follow-up CT scan is recommended in 3 months to ensure   complete resolution.
Patient denies chest pain or shortness of breath.   Review of systems otherwise (-)  	    MEDICATIONS  (STANDING):  ALBUTerol/ipratropium for Nebulization 3 milliLiter(s) Nebulizer every 6 hours  cefTRIAXone   IVPB 1000 milliGRAM(s) IV Intermittent every 24 hours  heparin  Injectable 5000 Unit(s) SubCutaneous every 12 hours  losartan 50 milliGRAM(s) Oral daily  sodium chloride 0.9%. 1000 milliLiter(s) (100 mL/Hr) IV Continuous <Continuous>      LABS:	 	                          12.5   10.40 )-----------( 358      ( 19 Jun 2019 09:48 )             39.9     Hemoglobin: 12.5 g/dL (06-19 @ 09:48)  Hemoglobin: 12.9 g/dL (06-18 @ 15:24)  Hemoglobin: 14.2 g/dL (06-17 @ 15:37)    06-18    135  |  101  |  16  ----------------------------<  124<H>  4.2   |  22  |  0.80    Ca    8.8      18 Jun 2019 15:24  Mg     2.3     06-18    TPro  7.5  /  Alb  3.9  /  TBili  0.3  /  DBili  x   /  AST  32  /  ALT  20  /  AlkPhos  76  06-18    Creatinine Trend: 0.80<--, 0.79<--  COAGS:       proBNP:   Lipid Profile:   HgA1c:   TSH:     PHYSICAL EXAM:  T(C): 37 (06-19-19 @ 08:00), Max: 37 (06-18-19 @ 18:57)  HR: 92 (06-19-19 @ 08:00) (87 - 107)  BP: 165/109 (06-19-19 @ 08:00) (154/88 - 185/95)  RR: 18 (06-19-19 @ 08:00) (17 - 18)  SpO2: 98% (06-19-19 @ 08:00) (97% - 98%)  Wt(kg): --  I&O's Summary    18 Jun 2019 07:01  -  19 Jun 2019 07:00  --------------------------------------------------------  IN: 240 mL / OUT: 0 mL / NET: 240 mL    19 Jun 2019 07:01  -  19 Jun 2019 11:42  --------------------------------------------------------  IN: 100 mL / OUT: 0 mL / NET: 100 mL      Height (cm): 147.32 (06-18 @ 12:55)  Weight (kg): 95.3 (06-18 @ 12:55)  BMI (kg/m2): 43.9 (06-18 @ 12:55)  BSA (m2): 1.86 (06-18 @ 12:55)    Gen: Appears well in NAD  HEENT:  (-)icterus (-)pallor  CV: N S1 S2 1/6 JUSTIN (+)2 Pulses B/l  Resp:  Clear to auscultation B/L, normal effort  GI: (+) BS Soft, NT, ND  Lymph:  (-)Edema, (-)obvious lymphadenopathy  Skin: Warm to touch, Normal turgor  Psych: Appropriate mood and affect      ECG:  	PENDING    RADIOLOGY:         CXR:  < from: Xray Chest 2 Views PA/Lat (06.18.19 @ 15:04) >  The heart is normal in size. Right middle lobe pneumonia is present. The   left lung is clear.      ASSESSMENT/PLAN: 43y Female PMH of HTN and GERD normal cardiac w/u in our office 6/18 which included an echo and stress echo who now presents after called back for +blood culture (gram positive cocci in pairs) and RML infiltrate on CXR    - No clinical CHF or anginal symptoms  - Abx per ID - f/u cultures  - Cont home antihypertensives and monitor  - will follow with you    Abel Uriostegui PA-C  [facility]  Pager: [phone]
SHERYL PAREDES 43y MRN-[id number]    Patient is a 43y old  Female who presents with a chief complaint of pna (19 Jun 2019 08:24)      Follow Up/CC:  ID following for bacteremia    Interval History/ROS: no fever, feels better    Allergies    No Known Allergies    Intolerances        ANTIMICROBIALS:  cefTRIAXone   IVPB 1000 every 24 hours      MEDICATIONS  (STANDING):  ALBUTerol/ipratropium for Nebulization 3 milliLiter(s) Nebulizer every 6 hours  cefTRIAXone   IVPB 1000 milliGRAM(s) IV Intermittent every 24 hours  heparin  Injectable 5000 Unit(s) SubCutaneous every 12 hours  losartan 50 milliGRAM(s) Oral two times a day    MEDICATIONS  (PRN):        Vital Signs Last 24 Hrs  T(C): 36.8 (21 Jun 2019 04:10), Max: 37.1 (20 Jun 2019 12:32)  T(F): 98.3 (21 Jun 2019 04:10), Max: 98.7 (20 Jun 2019 12:32)  HR: 82 (21 Jun 2019 04:10) (82 - 99)  BP: 154/114 (21 Jun 2019 06:20) (154/114 - 175/100)  BP(mean): --  RR: 18 (21 Jun 2019 04:10) (17 - 18)  SpO2: 99% (21 Jun 2019 04:10) (94% - 99%)                  MICROBIOLOGY:  .Throat  06-18-19   No Streptococcus pyogenes (Group A) isolated  --  --      .Blood  06-18-19   No growth to date.  --  --      .Urine  06-17-19   <10,000 CFU/mL Normal Urogenital Ruby  --  --      .Blood  06-17-19   Growth in aerobic and anaerobic bottles: Streptococcus pneumoniae  See previous culture [id number]  --  Blood Culture PCR  Streptococcus pneumoniae        Rapid RVP Result: NotDetec (06-17 @ 15:37)          RADIOLOGY    < from: CT Chest No Cont (06.19.19 @ 09:18) >  Groundglass opacities/consolidation are noted within the  right middle lobe. Follow-up CT scan is recommended in 3 months to ensure   complete resolution.    < end of copied text >
Follow-up Pulm Progress Note    No new respiratory events overnight.  Denies SOB/CP.   Sats 97% on RA    Medications:  MEDICATIONS  (STANDING):  ALBUTerol/ipratropium for Nebulization 3 milliLiter(s) Nebulizer every 6 hours  cefTRIAXone   IVPB 1000 milliGRAM(s) IV Intermittent every 24 hours  heparin  Injectable 5000 Unit(s) SubCutaneous every 12 hours  losartan 50 milliGRAM(s) Oral two times a day  sodium chloride 0.9%. 1000 milliLiter(s) (100 mL/Hr) IV Continuous <Continuous>      Vital Signs Last 24 Hrs  T(C): 37.1 (20 Jun 2019 12:32), Max: 37.1 (20 Jun 2019 12:32)  T(F): 98.7 (20 Jun 2019 12:32), Max: 98.7 (20 Jun 2019 12:32)  HR: 89 (20 Jun 2019 12:32) (89 - 99)  BP: 165/94 (20 Jun 2019 12:32) (147/89 - 168/98)  BP(mean): --  RR: 18 (20 Jun 2019 12:32) (18 - 18)  SpO2: 98% (20 Jun 2019 12:32) (97% - 99%)          06-19 @ 07:01  -  06-20 @ 07:00  --------------------------------------------------------  IN: 220 mL / OUT: 0 mL / NET: 220 mL          LABS:                        12.5   10.40 )-----------( 358      ( 19 Jun 2019 09:48 )             39.9             CULTURES:     Culture - Blood (collected 06-18-19 @ 20:40)  Source: .Blood  Preliminary Report (06-19-19 @ 21:01):    No growth to date.    Culture - Blood (collected 06-18-19 @ 20:40)  Source: .Blood  Preliminary Report (06-19-19 @ 21:01):    No growth to date.    Culture - Blood (collected 06-17-19 @ 17:56)  Source: .Blood  Gram Stain (06-18-19 @ 06:12):    Growth in aerobic bottle: Gram Positive Cocci in Pairs and Chains  Final Report (06-20-19 @ 12:06):    Growth in aerobic bottle: Streptococcus pneumoniae    "Due to technical problems, Proteus sp. will Not be reported as part of    the BCID panel until further notice"    ***Blood Panel PCR results on this specimen are available    approximately 3 hours after the Gram stain result.***    Gram stain, PCR, and/or culture results may not always    correspond due to difference in methodologies.    ************************************************************    This PCR assay was performed using [employer].    The following targets are tested for: Enterococcus,    vancomycin resistant enterococci, Listeria monocytogenes,    coagulase negative staphylococci, S. aureus,    methicillin resistant S. aureus, Streptococcus agalactiae    (Group B), S. pneumoniae, S. pyogenes (Group A),    Acinetobacter baumannii, Enterobacter cloacae, E. coli,    Klebsiella oxytoca, K. pneumoniae, Proteus sp.,    Serratia marcescens, Haemophilus influenzae,    Neisseria meningitidis, Pseudomonas aeruginosa, Candida    albicans, C. glabrata, C krusei, C parapsilosis,    C. tropicalis and the KPC resistance gene.  Organism: Blood Culture PCR  Streptococcus pneumoniae (06-20-19 @ 12:06)  Organism: Streptococcus pneumoniae (06-20-19 @ 12:06)      -  Ceftriaxone: See note 0.023      -  Penicillin: See note 0.016      Method Type: ETEST  Organism: Streptococcus pneumoniae (06-20-19 @ 12:06)      -  Erythromycin: S Predicts results for azithromycin.      -  Levofloxacin: S      -  Vancomycin: S      Method Type: KB  Organism: Blood Culture PCR (06-20-19 @ 12:06)      -  Streptococcus pneumoniae: Detec      Method Type: PCR    Culture - Blood (collected 06-17-19 @ 17:56)  Source: .Blood  Gram Stain (06-18-19 @ 08:25):    Growth in aerobic bottle: Gram Positive Cocci in Pairs and Chains    Growth in anaerobic bottle: Gram Positive Cocci in Pairs and Chains  Final Report (06-20-19 @ 07:37):    Growth in aerobic and anaerobic bottles: Streptococcus pneumoniae    See previous culture [id number]        Culture - Urine (collected 06-17-19 @ 22:22)  Source: .Urine  Final Report (06-18-19 @ 14:23):    <10,000 CFU/mL Normal Urogenital Ruby              Physical Examination:  PULM: Clear to auscultation bilaterally, no significant sputum production  CVS: RRR    RADIOLOGY REVIEWED  CT chest: < from: CT Chest No Cont (06.19.19 @ 09:18) >  No hilar and/or mediastinal adenopathy is noted.     Heart is enlarged in size. No pericardial effusion is noted.     No obvious lesion is noted at the orifice of the right middle lobe   bronchus. Evaluation of the distal portion of the right middle lobe   bronchus is limited. The remaining tracheobronchial tree is patent.   Groundglass opacities/consolidation are noted within the right middle   lobe. No pleural effusions are noted.    Below the diaphragm, visualized portions of the abdomen demonstrate   low-attenuation lesion in the right kidney which is indeterminate based   on this exam.     Visualized osseous structures are within normal limits.    Impression: Groundglass opacities/consolidation are noted within the  right middle lobe. Follow-up CT scan is recommended in 3 months to ensure   complete resolution.
SHERYL PAREDES 43y [id number]    Patient is a 43y old  Female who presents with a chief complaint of pna (2019 08:24)      Follow Up/CC:  ID following for bacteremia    Interval History/ROS: no fever, cough+, feels better    Allergies    No Known Allergies    Intolerances        ANTIMICROBIALS:  cefTRIAXone   IVPB 1000 every 24 hours      MEDICATIONS  (STANDING):  ALBUTerol/ipratropium for Nebulization 3 milliLiter(s) Nebulizer every 6 hours  cefTRIAXone   IVPB 1000 milliGRAM(s) IV Intermittent every 24 hours  heparin  Injectable 5000 Unit(s) SubCutaneous every 12 hours  losartan 50 milliGRAM(s) Oral daily  sodium chloride 0.9%. 1000 milliLiter(s) (100 mL/Hr) IV Continuous <Continuous>    MEDICATIONS  (PRN):        Vital Signs Last 24 Hrs  T(C): 37.1 (2019 12:00), Max: 37.1 (2019 12:00)  T(F): 98.8 (2019 12:00), Max: 98.8 (2019 12:00)  HR: 91 (2019 12:00) (87 - 107)  BP: 174/108 (2019 12:00) (154/88 - 174/108)  BP(mean): --  RR: 18 (2019 12:00) (17 - 18)  SpO2: 96% (2019 12:00) (96% - 98%)    CBC Full  -  ( 2019 09:48 )  WBC Count : 10.40 K/uL  RBC Count : 4.68 M/uL  Hemoglobin : 12.5 g/dL  Hematocrit : 39.9 %  Platelet Count - Automated : 358 K/uL  Mean Cell Volume : 85.3 fl  Mean Cell Hemoglobin : 26.7 pg  Mean Cell Hemoglobin Concentration : 31.3 gm/dL  Auto Neutrophil # : x  Auto Lymphocyte # : x  Auto Monocyte # : x  Auto Eosinophil # : x  Auto Basophil # : x  Auto Neutrophil % : x  Auto Lymphocyte % : x  Auto Monocyte % : x  Auto Eosinophil % : x  Auto Basophil % : x    06-18    135  |  101  |  16  ----------------------------<  124<H>  4.2   |  22  |  0.80    Ca    8.8      2019 15:24  Mg     2.3     06-18    TPro  7.5  /  Alb  3.9  /  TBili  0.3  /  DBili  x   /  AST  32  /  ALT  20  /  AlkPhos  76      LIVER FUNCTIONS - ( 2019 15:24 )  Alb: 3.9 g/dL / Pro: 7.5 g/dL / ALK PHOS: 76 U/L / ALT: 20 U/L / AST: 32 U/L / GGT: x           Urinalysis Basic - ( 2019 16:47 )    Color: Light Yellow / Appearance: Clear / S.017 / pH: x  Gluc: x / Ketone: Negative  / Bili: Negative / Urobili: Negative   Blood: x / Protein: Negative / Nitrite: Negative   Leuk Esterase: Negative / RBC: x / WBC x   Sq Epi: x / Non Sq Epi: x / Bacteria: x        MICROBIOLOGY:  .Urine  19   <10,000 CFU/mL Normal Urogenital Ruby  --  --      .Blood  19   Growth in aerobic and anaerobic bottles: Gram Positive Cocci in Pairs and  Chains  See previous culture [id number]  --  Blood Culture PCR      Rapid RVP Result: NotDetec ( @ 15:37)        RADIOLOGY    CT Chest No Cont (19 @ 09:18) >  Groundglass opacities/consolidation are noted within the  right middle lobe. Follow-up CT scan is recommended in 3 months to ensure   complete resolution.
SHERYL PAREDES 43y [id number]    Patient is a 43y old  Female who presents with a chief complaint of pna (19 Jun 2019 08:24)      Follow Up/CC:  ID following for bacteremia    Interval History/ROS: no fever, feels better    Allergies    No Known Allergies    Intolerances        ANTIMICROBIALS:  cefTRIAXone   IVPB 1000 every 24 hours      MEDICATIONS  (STANDING):  ALBUTerol/ipratropium for Nebulization 3 milliLiter(s) Nebulizer every 6 hours  cefTRIAXone   IVPB 1000 milliGRAM(s) IV Intermittent every 24 hours  heparin  Injectable 5000 Unit(s) SubCutaneous every 12 hours  losartan 50 milliGRAM(s) Oral two times a day  sodium chloride 0.9%. 1000 milliLiter(s) (100 mL/Hr) IV Continuous <Continuous>    MEDICATIONS  (PRN):        Vital Signs Last 24 Hrs  T(C): 37.1 (20 Jun 2019 12:32), Max: 37.1 (20 Jun 2019 12:32)  T(F): 98.7 (20 Jun 2019 12:32), Max: 98.7 (20 Jun 2019 12:32)  HR: 89 (20 Jun 2019 12:32) (89 - 99)  BP: 165/94 (20 Jun 2019 12:32) (147/89 - 168/98)  BP(mean): --  RR: 18 (20 Jun 2019 12:32) (18 - 18)  SpO2: 98% (20 Jun 2019 12:32) (97% - 99%)    CBC Full  -  ( 19 Jun 2019 09:48 )  WBC Count : 10.40 K/uL  RBC Count : 4.68 M/uL  Hemoglobin : 12.5 g/dL  Hematocrit : 39.9 %  Platelet Count - Automated : 358 K/uL  Mean Cell Volume : 85.3 fl  Mean Cell Hemoglobin : 26.7 pg  Mean Cell Hemoglobin Concentration : 31.3 gm/dL  Auto Neutrophil # : x  Auto Lymphocyte # : x  Auto Monocyte # : x  Auto Eosinophil # : x  Auto Basophil # : x  Auto Neutrophil % : x  Auto Lymphocyte % : x  Auto Monocyte % : x  Auto Eosinophil % : x  Auto Basophil % : x    06-18    135  |  101  |  16  ----------------------------<  124<H>  4.2   |  22  |  0.80    Ca    8.8      18 Jun 2019 15:24  Mg     2.3     06-18    TPro  7.5  /  Alb  3.9  /  TBili  0.3  /  DBili  x   /  AST  32  /  ALT  20  /  AlkPhos  76  06-18    LIVER FUNCTIONS - ( 18 Jun 2019 15:24 )  Alb: 3.9 g/dL / Pro: 7.5 g/dL / ALK PHOS: 76 U/L / ALT: 20 U/L / AST: 32 U/L / GGT: x               MICROBIOLOGY:  .Throat  06-18-19   No Streptococcus pyogenes (Group A) isolated  --  --      .Blood  06-18-19   No growth to date.  --  --      .Urine  06-17-19   <10,000 CFU/mL Normal Urogenital Ruby  --  --      .Blood  06-17-19   Growth in aerobic and anaerobic bottles: Streptococcus pneumoniae  See previous culture [id number]  --  Blood Culture PCR  Streptococcus pneumoniae      Rapid RVP Result: NotDetec (06-17 @ 15:37)          RADIOLOGY    < from: CT Chest No Cont (06.19.19 @ 09:18) >  : Groundglass opacities/consolidation are noted within the  right middle lobe. Follow-up CT scan is recommended in 3 months to ensure   complete resolution.    < end of copied text >

## 2019-06-21 NOTE — PROGRESS NOTE ADULT - NEGATIVE ENMT SYMPTOMS
no nasal congestion/no throat pain/no ear pain
no ear pain/no nasal congestion/no throat pain
no nasal congestion/no throat pain/no ear pain

## 2019-06-21 NOTE — PROGRESS NOTE ADULT - NEGATIVE GASTROINTESTINAL SYMPTOMS
no vomiting/no diarrhea/no abdominal pain/no nausea
no abdominal pain/no vomiting/no nausea/no diarrhea
no vomiting/no diarrhea/no nausea/no abdominal pain

## 2019-06-21 NOTE — PROGRESS NOTE ADULT - NEGATIVE GENERAL GENITOURINARY SYMPTOMS
no hematuria/no dysuria/no flank pain L/no flank pain R
no flank pain R/no hematuria/no flank pain L/no dysuria
no dysuria/no hematuria/no flank pain R/no flank pain L

## 2019-06-21 NOTE — PROGRESS NOTE ADULT - ATTENDING COMMENTS
Ho Mayorga  Attending Physician   Division of Infectious Disease  Pager #[phone]  After 5pm/weekend or no response, call #[phone]
Ho Mayorga  Attending Physician   Division of Infectious Disease  Pager #[phone]  After 5pm/weekend or no response, call #[phone]
Ho Mayorga  Attending Physician   Division of Infectious Disease  Pager #[phone]  After 5pm/weekend or no response, call #[phone]    Please call the ID service [phone] with questions or concerns over the weekend.

## 2019-06-21 NOTE — PROGRESS NOTE ADULT - PROBLEM SELECTOR PROBLEM 3
Hemoptysis
Hemoptysis
Pneumonia of right middle lobe due to Streptococcus pneumoniae

## 2019-06-23 LAB
CULTURE RESULTS: SIGNIFICANT CHANGE UP
CULTURE RESULTS: SIGNIFICANT CHANGE UP
SPECIMEN SOURCE: SIGNIFICANT CHANGE UP
SPECIMEN SOURCE: SIGNIFICANT CHANGE UP

## 2020-02-11 ENCOUNTER — OUTPATIENT (OUTPATIENT)
Dept: OUTPATIENT SERVICES | Facility: HOSPITAL | Age: 45
LOS: 1 days | End: 2020-02-11

## 2020-02-11 ENCOUNTER — LABORATORY RESULT (OUTPATIENT)
Age: 45
End: 2020-02-11

## 2020-02-11 ENCOUNTER — NON-APPOINTMENT (OUTPATIENT)
Age: 45
End: 2020-02-11

## 2020-02-11 ENCOUNTER — APPOINTMENT (OUTPATIENT)
Dept: INTERNAL MEDICINE | Facility: CLINIC | Age: 45
End: 2020-02-11
Payer: MEDICAID

## 2020-02-11 VITALS
HEART RATE: 102 BPM | DIASTOLIC BLOOD PRESSURE: 68 MMHG | HEIGHT: 58 IN | OXYGEN SATURATION: 98 % | BODY MASS INDEX: 45.76 KG/M2 | SYSTOLIC BLOOD PRESSURE: 152 MMHG | WEIGHT: 218 LBS

## 2020-02-11 DIAGNOSIS — B35.4 TINEA CORPORIS: ICD-10-CM

## 2020-02-11 DIAGNOSIS — Z82.49 FAMILY HISTORY OF ISCHEMIC HEART DISEASE AND OTHER DISEASES OF THE CIRCULATORY SYSTEM: ICD-10-CM

## 2020-02-11 DIAGNOSIS — Z00.00 ENCOUNTER FOR GENERAL ADULT MEDICAL EXAMINATION W/OUT ABNORMAL FINDINGS: ICD-10-CM

## 2020-02-11 DIAGNOSIS — Z80.1 FAMILY HISTORY OF MALIGNANT NEOPLASM OF TRACHEA, BRONCHUS AND LUNG: ICD-10-CM

## 2020-02-11 DIAGNOSIS — Z83.438 FAMILY HISTORY OF OTHER DISORDER OF LIPOPROTEIN METABOLISM AND OTHER LIPIDEMIA: ICD-10-CM

## 2020-02-11 LAB
ALBUMIN SERPL ELPH-MCNC: 4.1 G/DL — SIGNIFICANT CHANGE UP (ref 3.3–5)
ALP SERPL-CCNC: 83 U/L — SIGNIFICANT CHANGE UP (ref 40–120)
ALT FLD-CCNC: 17 U/L — SIGNIFICANT CHANGE UP (ref 4–33)
ANION GAP SERPL CALC-SCNC: 10 MMO/L — SIGNIFICANT CHANGE UP (ref 7–14)
AST SERPL-CCNC: 19 U/L — SIGNIFICANT CHANGE UP (ref 4–32)
BASOPHILS # BLD AUTO: 0.04 K/UL — SIGNIFICANT CHANGE UP (ref 0–0.2)
BASOPHILS NFR BLD AUTO: 0.6 % — SIGNIFICANT CHANGE UP (ref 0–2)
BILIRUB SERPL-MCNC: < 0.2 MG/DL — LOW (ref 0.2–1.2)
BUN SERPL-MCNC: 15 MG/DL — SIGNIFICANT CHANGE UP (ref 7–23)
CALCIUM SERPL-MCNC: 9.6 MG/DL — SIGNIFICANT CHANGE UP (ref 8.4–10.5)
CHLORIDE SERPL-SCNC: 101 MMOL/L — SIGNIFICANT CHANGE UP (ref 98–107)
CHOLEST SERPL-MCNC: 148 MG/DL — SIGNIFICANT CHANGE UP (ref 120–199)
CO2 SERPL-SCNC: 28 MMOL/L — SIGNIFICANT CHANGE UP (ref 22–31)
CREAT SERPL-MCNC: 0.89 MG/DL — SIGNIFICANT CHANGE UP (ref 0.5–1.3)
EOSINOPHIL # BLD AUTO: 0.4 K/UL — SIGNIFICANT CHANGE UP (ref 0–0.5)
EOSINOPHIL NFR BLD AUTO: 6 % — SIGNIFICANT CHANGE UP (ref 0–6)
GLUCOSE SERPL-MCNC: 102 MG/DL — HIGH (ref 70–99)
HCT VFR BLD CALC: 38.2 % — SIGNIFICANT CHANGE UP (ref 34.5–45)
HDLC SERPL-MCNC: 43 MG/DL — LOW (ref 45–65)
HGB BLD-MCNC: 12.1 G/DL — SIGNIFICANT CHANGE UP (ref 11.5–15.5)
IMM GRANULOCYTES NFR BLD AUTO: 0.3 % — SIGNIFICANT CHANGE UP (ref 0–1.5)
LIPID PNL WITH DIRECT LDL SERPL: 96 MG/DL — SIGNIFICANT CHANGE UP
LYMPHOCYTES # BLD AUTO: 1.2 K/UL — SIGNIFICANT CHANGE UP (ref 1–3.3)
LYMPHOCYTES # BLD AUTO: 17.9 % — SIGNIFICANT CHANGE UP (ref 13–44)
MCHC RBC-ENTMCNC: 27.3 PG — SIGNIFICANT CHANGE UP (ref 27–34)
MCHC RBC-ENTMCNC: 31.7 % — LOW (ref 32–36)
MCV RBC AUTO: 86 FL — SIGNIFICANT CHANGE UP (ref 80–100)
MONOCYTES # BLD AUTO: 0.61 K/UL — SIGNIFICANT CHANGE UP (ref 0–0.9)
MONOCYTES NFR BLD AUTO: 9.1 % — SIGNIFICANT CHANGE UP (ref 2–14)
NEUTROPHILS # BLD AUTO: 4.45 K/UL — SIGNIFICANT CHANGE UP (ref 1.8–7.4)
NEUTROPHILS NFR BLD AUTO: 66.1 % — SIGNIFICANT CHANGE UP (ref 43–77)
NRBC # FLD: 0 K/UL — SIGNIFICANT CHANGE UP (ref 0–0)
PLATELET # BLD AUTO: 377 K/UL — SIGNIFICANT CHANGE UP (ref 150–400)
PMV BLD: 10 FL — SIGNIFICANT CHANGE UP (ref 7–13)
POTASSIUM SERPL-MCNC: 3.5 MMOL/L — SIGNIFICANT CHANGE UP (ref 3.5–5.3)
POTASSIUM SERPL-SCNC: 3.5 MMOL/L — SIGNIFICANT CHANGE UP (ref 3.5–5.3)
PROT SERPL-MCNC: 7.5 G/DL — SIGNIFICANT CHANGE UP (ref 6–8.3)
RBC # BLD: 4.44 M/UL — SIGNIFICANT CHANGE UP (ref 3.8–5.2)
RBC # FLD: 13.7 % — SIGNIFICANT CHANGE UP (ref 10.3–14.5)
SODIUM SERPL-SCNC: 139 MMOL/L — SIGNIFICANT CHANGE UP (ref 135–145)
TRIGL SERPL-MCNC: 125 MG/DL — SIGNIFICANT CHANGE UP (ref 10–149)
TSH SERPL-MCNC: 2.24 UIU/ML — SIGNIFICANT CHANGE UP (ref 0.27–4.2)
WBC # BLD: 6.72 K/UL — SIGNIFICANT CHANGE UP (ref 3.8–10.5)
WBC # FLD AUTO: 6.72 K/UL — SIGNIFICANT CHANGE UP (ref 3.8–10.5)

## 2020-02-11 PROCEDURE — 99386 PREV VISIT NEW AGE 40-64: CPT

## 2020-02-11 RX ORDER — CLOTRIMAZOLE 10 MG/G
1 CREAM TOPICAL
Qty: 1 | Refills: 1 | Status: ACTIVE | COMMUNITY
Start: 2020-02-11 | End: 1900-01-01

## 2020-02-11 RX ORDER — TRIAMCINOLONE ACETONIDE 1 MG/G
0.1 CREAM TOPICAL TWICE DAILY
Qty: 30 | Refills: 1 | Status: ACTIVE | COMMUNITY
Start: 2020-02-11 | End: 1900-01-01

## 2020-02-11 NOTE — PAST MEDICAL HISTORY
[Menstruating] : menstruating [Menarche Age ____] : age at menarche was [unfilled] [Normal Amount/Duration] : it was of a normal amount and duration [Definite ___ (Date)] : the last menstrual period was [unfilled] [Total Preg ___] : G[unfilled] [Regular Cycle Intervals] : have been regular

## 2020-02-12 DIAGNOSIS — B35.4 TINEA CORPORIS: ICD-10-CM

## 2020-02-12 DIAGNOSIS — L30.4 ERYTHEMA INTERTRIGO: ICD-10-CM

## 2020-02-12 DIAGNOSIS — E66.01 MORBID (SEVERE) OBESITY DUE TO EXCESS CALORIES: ICD-10-CM

## 2020-02-12 DIAGNOSIS — Z00.00 ENCOUNTER FOR GENERAL ADULT MEDICAL EXAMINATION WITHOUT ABNORMAL FINDINGS: ICD-10-CM

## 2020-02-12 DIAGNOSIS — I10 ESSENTIAL (PRIMARY) HYPERTENSION: ICD-10-CM

## 2020-02-12 DIAGNOSIS — F41.9 ANXIETY DISORDER, UNSPECIFIED: ICD-10-CM

## 2020-02-12 NOTE — HEALTH RISK ASSESSMENT
[Fair] :  ~his/her~ mood as fair [No] : No [No falls in past year] : Patient reported no falls in the past year [1] : 1) Little interest or pleasure doing things for several days (1) [0] : 2) Feeling down, depressed, or hopeless: Not at all (0) [Patient reported PAP Smear was normal] : Patient reported PAP Smear was normal [HIV test declined] : HIV test declined [With Significant Other] : lives with significant other [None] : None [] :  [Employed] : employed [Sexually Active] : sexually active [Feels Safe at Home] : Feels safe at home [Fully functional (bathing, dressing, toileting, transferring, walking, feeding)] : Fully functional (bathing, dressing, toileting, transferring, walking, feeding) [Fully functional (using the telephone, shopping, preparing meals, housekeeping, doing laundry, using] : Fully functional and needs no help or supervision to perform IADLs (using the telephone, shopping, preparing meals, housekeeping, doing laundry, using transportation, managing medications and managing finances) [Smoke Detector] : smoke detector [With Patient/Caregiver] : With Patient/Caregiver [Designated Healthcare Proxy] : Designated healthcare proxy [I will adhere to the patient's wishes as expressed in the advance directive except as noted below.] : I will adhere to the patient's wishes as expressed in the advance directive except as noted below [] : No [[id number]] : 1 [Change in mental status noted] : No change in mental status noted [High Risk Behavior] : no high risk behavior [Reports changes in hearing] : Reports no changes in hearing [Reports changes in vision] : Reports no changes in vision [Reports changes in dental health] : Reports no changes in dental health [PapSmearDate] : 2017 [FreeTextEntry3] : never pregnant [AdvancecareDate] : 2/11/20 [FreeTextEntry4] : scanned in health care proxy form
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
## 2020-02-12 NOTE — ASSESSMENT
[FreeTextEntry1] : Health care maintenance:  \par -check lipids given family history of hypercholesterolemia\par -declined STI testing\par -UTD pap smear late 2017 - due later this year if cannot bring records showing HPV testing at that time\par -no elevated risk factors identified for early breast cancer screening\par -asked to bring her vaccination records\par \par Prior PCP was Dr. Herrera in [facility]

## 2020-02-12 NOTE — COUNSELING
[Potential consequences of obesity discussed] : Potential consequences of obesity discussed [Benefits of weight loss discussed] : Benefits of weight loss discussed [Encouraged to increase physical activity] : Encouraged to increase physical activity [FreeTextEntry2] : nutrition visit

## 2020-02-12 NOTE — PHYSICAL EXAM
[Well Nourished] : well nourished [Well Developed] : well developed [No Acute Distress] : no acute distress [Well-Appearing] : well-appearing [EOMI] : extraocular movements intact [Normal Sclera/Conjunctiva] : normal sclera/conjunctiva [Normal Outer Ear/Nose] : the outer ears and nose were normal in appearance [Normal Nasal Mucosa] : the nasal mucosa was normal [Normal Oropharynx] : the oropharynx was normal [No JVD] : no jugular venous distention [Thyroid Normal, No Nodules] : the thyroid was normal and there were no nodules present [Supple] : supple [No Respiratory Distress] : no respiratory distress  [No Accessory Muscle Use] : no accessory muscle use [Clear to Auscultation] : lungs were clear to auscultation bilaterally [Normal S1, S2] : normal S1 and S2 [Normal Rate] : normal rate  [Regular Rhythm] : with a regular rhythm [Normal Appearance] : normal in appearance [No Edema] : there was no peripheral edema [No Axillary Lymphadenopathy] : no axillary lymphadenopathy [No Masses] : no palpable masses [No Nipple Discharge] : no nipple discharge [Non Tender] : non-tender [Soft] : abdomen soft [Normal Bowel Sounds] : normal bowel sounds [Grossly Normal Strength/Tone] : grossly normal strength/tone [No Joint Swelling] : no joint swelling [No CVA Tenderness] : no CVA  tenderness [Coordination Grossly Intact] : coordination grossly intact [Normal Gait] : normal gait [No Focal Deficits] : no focal deficits [Speech Grossly Normal] : speech grossly normal [Memory Grossly Normal] : memory grossly normal [de-identified] : obese; /72 [de-identified] : crowded oropharynx [de-identified] : no tachycardia [de-identified] : no breast nodules; +small erythematous follicle on right breast just superior to nipple, no nodule underlying [de-identified] : +acanthosis nigricans on neck/axillae; +erythematous based plaques on both sides of torso and in axillae with lichenification, and one scaly silvery plaque on erythematous base over right extensor elbow; +multiple skin tags on neck/axillae/inframammary; +hyperpigmented inframammary right sided smooth patch in lunular shape without erythema or satellite lesions [de-identified] : anxious appearing

## 2020-02-12 NOTE — REVIEW OF SYSTEMS
[Recent Change In Weight] : ~T recent weight change [Itching] : Itching [Skin Rash] : skin rash [Negative] : Heme/Lymph [Fever] : no fever [Fatigue] : no fatigue [Night Sweats] : no night sweats [Mole Changes] : no mole changes [Nail Changes] : no nail changes [Hair Changes] : no hair changes

## 2020-02-13 ENCOUNTER — APPOINTMENT (OUTPATIENT)
Dept: INTERNAL MEDICINE | Facility: CLINIC | Age: 45
End: 2020-02-13

## 2020-03-30 ENCOUNTER — APPOINTMENT (OUTPATIENT)
Dept: INTERNAL MEDICINE | Facility: CLINIC | Age: 45
End: 2020-03-30

## 2020-04-01 ENCOUNTER — APPOINTMENT (OUTPATIENT)
Dept: INTERNAL MEDICINE | Facility: CLINIC | Age: 45
End: 2020-04-01

## 2020-04-17 ENCOUNTER — APPOINTMENT (OUTPATIENT)
Dept: DERMATOLOGY | Facility: CLINIC | Age: 45
End: 2020-04-17

## 2020-05-04 ENCOUNTER — APPOINTMENT (OUTPATIENT)
Dept: INTERNAL MEDICINE | Facility: CLINIC | Age: 45
End: 2020-05-04

## 2020-05-05 ENCOUNTER — APPOINTMENT (OUTPATIENT)
Dept: INTERNAL MEDICINE | Facility: CLINIC | Age: 45
End: 2020-05-05

## 2020-05-05 ENCOUNTER — OUTPATIENT (OUTPATIENT)
Dept: OUTPATIENT SERVICES | Facility: HOSPITAL | Age: 45
LOS: 1 days | End: 2020-05-05

## 2020-05-05 VITALS — HEIGHT: 58 IN | WEIGHT: 218 LBS | BODY MASS INDEX: 45.76 KG/M2

## 2020-07-22 ENCOUNTER — EMERGENCY (EMERGENCY)
Facility: HOSPITAL | Age: 45
LOS: 1 days | Discharge: HOME | End: 2020-07-22
Attending: EMERGENCY MEDICINE | Admitting: EMERGENCY MEDICINE
Payer: MEDICAID

## 2020-07-22 VITALS
DIASTOLIC BLOOD PRESSURE: 96 MMHG | RESPIRATION RATE: 20 BRPM | HEART RATE: 81 BPM | OXYGEN SATURATION: 100 % | SYSTOLIC BLOOD PRESSURE: 175 MMHG

## 2020-07-22 VITALS
SYSTOLIC BLOOD PRESSURE: 189 MMHG | DIASTOLIC BLOOD PRESSURE: 126 MMHG | OXYGEN SATURATION: 100 % | HEART RATE: 92 BPM | TEMPERATURE: 98 F | RESPIRATION RATE: 16 BRPM

## 2020-07-22 LAB
ALBUMIN SERPL ELPH-MCNC: 4.4 G/DL — SIGNIFICANT CHANGE UP (ref 3.3–5)
ALP SERPL-CCNC: 90 U/L — SIGNIFICANT CHANGE UP (ref 40–120)
ALT FLD-CCNC: 15 U/L — SIGNIFICANT CHANGE UP (ref 4–33)
ANION GAP SERPL CALC-SCNC: 11 MMO/L — SIGNIFICANT CHANGE UP (ref 7–14)
AST SERPL-CCNC: 15 U/L — SIGNIFICANT CHANGE UP (ref 4–32)
BASOPHILS # BLD AUTO: 0.04 K/UL — SIGNIFICANT CHANGE UP (ref 0–0.2)
BASOPHILS NFR BLD AUTO: 0.6 % — SIGNIFICANT CHANGE UP (ref 0–2)
BILIRUB SERPL-MCNC: 0.4 MG/DL — SIGNIFICANT CHANGE UP (ref 0.2–1.2)
BUN SERPL-MCNC: 11 MG/DL — SIGNIFICANT CHANGE UP (ref 7–23)
CALCIUM SERPL-MCNC: 9.2 MG/DL — SIGNIFICANT CHANGE UP (ref 8.4–10.5)
CHLORIDE SERPL-SCNC: 99 MMOL/L — SIGNIFICANT CHANGE UP (ref 98–107)
CO2 SERPL-SCNC: 27 MMOL/L — SIGNIFICANT CHANGE UP (ref 22–31)
CREAT SERPL-MCNC: 0.76 MG/DL — SIGNIFICANT CHANGE UP (ref 0.5–1.3)
EOSINOPHIL # BLD AUTO: 0.21 K/UL — SIGNIFICANT CHANGE UP (ref 0–0.5)
EOSINOPHIL NFR BLD AUTO: 3.1 % — SIGNIFICANT CHANGE UP (ref 0–6)
GLUCOSE SERPL-MCNC: 86 MG/DL — SIGNIFICANT CHANGE UP (ref 70–99)
HCT VFR BLD CALC: 38.2 % — SIGNIFICANT CHANGE UP (ref 34.5–45)
HGB BLD-MCNC: 12.7 G/DL — SIGNIFICANT CHANGE UP (ref 11.5–15.5)
IMM GRANULOCYTES NFR BLD AUTO: 0.3 % — SIGNIFICANT CHANGE UP (ref 0–1.5)
LYMPHOCYTES # BLD AUTO: 0.9 K/UL — LOW (ref 1–3.3)
LYMPHOCYTES # BLD AUTO: 13.2 % — SIGNIFICANT CHANGE UP (ref 13–44)
MAGNESIUM SERPL-MCNC: 2 MG/DL — SIGNIFICANT CHANGE UP (ref 1.6–2.6)
MCHC RBC-ENTMCNC: 27.2 PG — SIGNIFICANT CHANGE UP (ref 27–34)
MCHC RBC-ENTMCNC: 33.2 % — SIGNIFICANT CHANGE UP (ref 32–36)
MCV RBC AUTO: 81.8 FL — SIGNIFICANT CHANGE UP (ref 80–100)
MONOCYTES # BLD AUTO: 0.62 K/UL — SIGNIFICANT CHANGE UP (ref 0–0.9)
MONOCYTES NFR BLD AUTO: 9.1 % — SIGNIFICANT CHANGE UP (ref 2–14)
NEUTROPHILS # BLD AUTO: 5.03 K/UL — SIGNIFICANT CHANGE UP (ref 1.8–7.4)
NEUTROPHILS NFR BLD AUTO: 73.7 % — SIGNIFICANT CHANGE UP (ref 43–77)
NRBC # FLD: 0 K/UL — SIGNIFICANT CHANGE UP (ref 0–0)
PHOSPHATE SERPL-MCNC: 3.1 MG/DL — SIGNIFICANT CHANGE UP (ref 2.5–4.5)
PLATELET # BLD AUTO: 363 K/UL — SIGNIFICANT CHANGE UP (ref 150–400)
PMV BLD: 10 FL — SIGNIFICANT CHANGE UP (ref 7–13)
POTASSIUM SERPL-MCNC: 3.5 MMOL/L — SIGNIFICANT CHANGE UP (ref 3.5–5.3)
POTASSIUM SERPL-SCNC: 3.5 MMOL/L — SIGNIFICANT CHANGE UP (ref 3.5–5.3)
PROT SERPL-MCNC: 7 G/DL — SIGNIFICANT CHANGE UP (ref 6–8.3)
RBC # BLD: 4.67 M/UL — SIGNIFICANT CHANGE UP (ref 3.8–5.2)
RBC # FLD: 13.7 % — SIGNIFICANT CHANGE UP (ref 10.3–14.5)
SODIUM SERPL-SCNC: 137 MMOL/L — SIGNIFICANT CHANGE UP (ref 135–145)
TSH SERPL-MCNC: 3.47 UIU/ML — SIGNIFICANT CHANGE UP (ref 0.27–4.2)
WBC # BLD: 6.82 K/UL — SIGNIFICANT CHANGE UP (ref 3.8–10.5)
WBC # FLD AUTO: 6.82 K/UL — SIGNIFICANT CHANGE UP (ref 3.8–10.5)

## 2020-07-22 PROCEDURE — 93010 ELECTROCARDIOGRAM REPORT: CPT

## 2020-07-22 PROCEDURE — 99283 EMERGENCY DEPT VISIT LOW MDM: CPT | Mod: 25

## 2020-07-22 NOTE — ED PROVIDER NOTE - PHYSICAL EXAMINATION
Gen: Well appearing in NAD  Head: NC/AT  Neck: trachea midline  Resp:  No distress CTAB  CV: RRR  Ext: no deformities  Neuro:  A&O appears non focal  Skin:  Warm and dry as visualized  Psych:  Normal affect and mood

## 2020-07-22 NOTE — ED ADULT NURSE NOTE - OBJECTIVE STATEMENT
Patient is a 44-year-old female A&OX4, ambulatory coming in complaining of palpitations, feeling "off" since taking her BP medications that she started taking around a year ago. Patient says she was seen by her PCP, but her PCP would not make changes to her medication regimen, pt states " not hearing her out". Patient says she cut her dosage in half. Patient states her legs feel pain and arms. Patient motor/sensory intact. Patient also anxious on assessment. Patient NSR on the cardiac monitor. Respirations are even and unlabored, chest rise equal b/l. MD Hartley at the bedside to evaluate. Pending further orders at this time. Will continue to monitor. Patient is a 44-year-old female A&OX4, ambulatory coming in complaining of palpitations, feeling "off" since taking her BP medications that she started taking around a year ago. Patient says she was seen by her PCP, but her PCP would not make changes to her medication regimen, pt states " not hearing her out". Patient says she cut her dosage in half. Patient states her legs feel pain and arms. Patient motor/sensory intact. Patient also anxious on assessment. Patient NSR on the cardiac monitor. Respirations are even and unlabored, chest rise equal b/l. 20 G IV placed in R AC. Labs drawn and sent. MD Hartley at the bedside to evaluate. Pending further orders at this time. Will continue to monitor. Patient is a 44-year-old female A&OX4, ambulatory coming in complaining of palpitations, feeling "off" since taking her BP medications that she started taking around a year ago. Patient says she was seen by her PCP, but her PCP would not make changes to her medication regimen, pt states " not hearing her out". Patient says she cut her dosage in half. Patient states her legs feel pain/parathesias and arms. Patient motor/sensory intact. Patient also anxious on assessment. Patient NSR on the cardiac monitor. Respirations are even and unlabored, chest rise equal b/l. 20 G IV placed in R AC. Labs drawn and sent. MD Hartley at the bedside to evaluate. Pending further orders at this time. Will continue to monitor.

## 2020-07-22 NOTE — ED PROVIDER NOTE - OBJECTIVE STATEMENT
43 yo with an episode last night of palpitations that has started to improve since arrival at the hospital.  There was no SOB or CP.  Has been taking two BP meds which recently changed and she has been having paresthesias in her hands and feet which she thought was due to medications so has been altering her medications on her own.

## 2020-07-22 NOTE — ED PROVIDER NOTE - PROGRESS NOTE DETAILS
Patient re-assessed.  Feels well.  No further CP or palps.  Stable for discharge.  Has appt with PMD today.

## 2020-07-22 NOTE — ED ADULT NURSE REASSESSMENT NOTE - NS ED NURSE REASSESS COMMENT FT1
Patient alert and oriented x4, saline lock 20g to right AC patent and intact, patient breathing on room air, no new distress noted, awaiting for lab results and disposition.

## 2020-07-22 NOTE — ED ADULT TRIAGE NOTE - CHIEF COMPLAINT QUOTE
pt arrives c/o palpitations last night, symptoms since subsided, prescribed losartan 100 mg for HTN but took 50 mg instead because her "prescribed dose gives her anxiety," denies current CP SOB palpitations, denies HA/ visual changes, appears comfortable

## 2020-07-22 NOTE — ED PROVIDER NOTE - PATIENT PORTAL LINK FT
You can access the FollowMyHealth Patient Portal offered by [facility] by registering at the following website: [email or web address]. By joining [facility]’s FollowMyHealth portal, you will also be able to view your health information using other applications (apps) compatible with our system.

## 2020-07-22 NOTE — ED PROVIDER NOTE - NSFOLLOWUPINSTRUCTIONS_ED_ALL_ED_FT
Palpitations    A palpitation is the feeling that your heartbeat is irregular or is faster than normal. It may feel like your heart is fluttering or skipping a beat. They may be caused by many things, including smoking, caffeine, alcohol, stress, and certain medicines. Although most causes of palpitations are not serious, palpitations can be a sign of a serious medical problem. Avoid caffeine, alcohol, and tobacco products at home. Try to reduce stress and anxiety and make sure to get plenty of rest.     SEEK IMMEDIATE MEDICAL CARE IF YOU HAVE ANY OF THE FOLLOWING SYMPTOMS: chest pain, shortness of breath, severe headache, dizziness/lightheadedness, or fainting. Palpitations    A palpitation is the feeling that your heartbeat is irregular or is faster than normal. It may feel like your heart is fluttering or skipping a beat. They may be caused by many things, including smoking, caffeine, alcohol, stress, and certain medicines. Although most causes of palpitations are not serious, palpitations can be a sign of a serious medical problem. Avoid caffeine, alcohol, and tobacco products at home. Try to reduce stress and anxiety and make sure to get plenty of rest.     SEEK IMMEDIATE MEDICAL CARE IF YOU HAVE ANY OF THE FOLLOWING SYMPTOMS: chest pain, shortness of breath, severe headache, dizziness/lightheadedness, or fainting.    Please follow up with your doctor as planned later today regarding your elevated blood pressure. Take all your medications as prescribed.

## 2020-07-22 NOTE — ED PROVIDER NOTE - CLINICAL SUMMARY MEDICAL DECISION MAKING FREE TEXT BOX
pt with an episode of palps.  Here HD stable and no arrythmia on EKG  Will check lytes TSH and HgB for possible etiologies.  Already has a PMD appointment this morning

## 2020-08-03 ENCOUNTER — APPOINTMENT (OUTPATIENT)
Dept: INTERNAL MEDICINE | Facility: CLINIC | Age: 45
End: 2020-08-03

## 2020-08-25 ENCOUNTER — APPOINTMENT (OUTPATIENT)
Dept: INTERNAL MEDICINE | Facility: CLINIC | Age: 45
End: 2020-08-25

## 2020-08-28 ENCOUNTER — EMERGENCY (EMERGENCY)
Facility: HOSPITAL | Age: 45
LOS: 1 days | Discharge: HOME | End: 2020-08-28
Attending: EMERGENCY MEDICINE | Admitting: EMERGENCY MEDICINE
Payer: MEDICAID

## 2020-08-28 VITALS
SYSTOLIC BLOOD PRESSURE: 199 MMHG | DIASTOLIC BLOOD PRESSURE: 136 MMHG | OXYGEN SATURATION: 100 % | HEART RATE: 93 BPM | RESPIRATION RATE: 18 BRPM | TEMPERATURE: 98 F

## 2020-08-28 VITALS
RESPIRATION RATE: 20 BRPM | SYSTOLIC BLOOD PRESSURE: 183 MMHG | TEMPERATURE: 98 F | HEART RATE: 83 BPM | DIASTOLIC BLOOD PRESSURE: 113 MMHG | OXYGEN SATURATION: 100 %

## 2020-08-28 LAB
ALBUMIN SERPL ELPH-MCNC: 4 G/DL — SIGNIFICANT CHANGE UP (ref 3.3–5)
ALP SERPL-CCNC: 92 U/L — SIGNIFICANT CHANGE UP (ref 40–120)
ALT FLD-CCNC: 12 U/L — SIGNIFICANT CHANGE UP (ref 4–33)
ANION GAP SERPL CALC-SCNC: 12 MMO/L — SIGNIFICANT CHANGE UP (ref 7–14)
AST SERPL-CCNC: 16 U/L — SIGNIFICANT CHANGE UP (ref 4–32)
BASOPHILS # BLD AUTO: 0.08 K/UL — SIGNIFICANT CHANGE UP (ref 0–0.2)
BASOPHILS NFR BLD AUTO: 1 % — SIGNIFICANT CHANGE UP (ref 0–2)
BILIRUB SERPL-MCNC: 0.2 MG/DL — SIGNIFICANT CHANGE UP (ref 0.2–1.2)
BUN SERPL-MCNC: 14 MG/DL — SIGNIFICANT CHANGE UP (ref 7–23)
CALCIUM SERPL-MCNC: 8.9 MG/DL — SIGNIFICANT CHANGE UP (ref 8.4–10.5)
CHLORIDE SERPL-SCNC: 99 MMOL/L — SIGNIFICANT CHANGE UP (ref 98–107)
CO2 SERPL-SCNC: 23 MMOL/L — SIGNIFICANT CHANGE UP (ref 22–31)
CREAT SERPL-MCNC: 0.72 MG/DL — SIGNIFICANT CHANGE UP (ref 0.5–1.3)
EOSINOPHIL # BLD AUTO: 0.34 K/UL — SIGNIFICANT CHANGE UP (ref 0–0.5)
EOSINOPHIL NFR BLD AUTO: 4.3 % — SIGNIFICANT CHANGE UP (ref 0–6)
GLUCOSE SERPL-MCNC: 106 MG/DL — HIGH (ref 70–99)
HCT VFR BLD CALC: 39.5 % — SIGNIFICANT CHANGE UP (ref 34.5–45)
HGB BLD-MCNC: 12.4 G/DL — SIGNIFICANT CHANGE UP (ref 11.5–15.5)
IMM GRANULOCYTES NFR BLD AUTO: 0.4 % — SIGNIFICANT CHANGE UP (ref 0–1.5)
LYMPHOCYTES # BLD AUTO: 1.14 K/UL — SIGNIFICANT CHANGE UP (ref 1–3.3)
LYMPHOCYTES # BLD AUTO: 14.5 % — SIGNIFICANT CHANGE UP (ref 13–44)
MCHC RBC-ENTMCNC: 26.2 PG — LOW (ref 27–34)
MCHC RBC-ENTMCNC: 31.4 % — LOW (ref 32–36)
MCV RBC AUTO: 83.5 FL — SIGNIFICANT CHANGE UP (ref 80–100)
MONOCYTES # BLD AUTO: 0.66 K/UL — SIGNIFICANT CHANGE UP (ref 0–0.9)
MONOCYTES NFR BLD AUTO: 8.4 % — SIGNIFICANT CHANGE UP (ref 2–14)
NEUTROPHILS # BLD AUTO: 5.59 K/UL — SIGNIFICANT CHANGE UP (ref 1.8–7.4)
NEUTROPHILS NFR BLD AUTO: 71.4 % — SIGNIFICANT CHANGE UP (ref 43–77)
NRBC # FLD: 0 K/UL — SIGNIFICANT CHANGE UP (ref 0–0)
PLATELET # BLD AUTO: 312 K/UL — SIGNIFICANT CHANGE UP (ref 150–400)
PMV BLD: 10.3 FL — SIGNIFICANT CHANGE UP (ref 7–13)
POTASSIUM SERPL-MCNC: 3.2 MMOL/L — LOW (ref 3.5–5.3)
POTASSIUM SERPL-MCNC: 3.3 MMOL/L — LOW (ref 3.5–5.3)
POTASSIUM SERPL-SCNC: 3.2 MMOL/L — LOW (ref 3.5–5.3)
POTASSIUM SERPL-SCNC: 3.3 MMOL/L — LOW (ref 3.5–5.3)
PROT SERPL-MCNC: 7.2 G/DL — SIGNIFICANT CHANGE UP (ref 6–8.3)
RBC # BLD: 4.73 M/UL — SIGNIFICANT CHANGE UP (ref 3.8–5.2)
RBC # FLD: 13.4 % — SIGNIFICANT CHANGE UP (ref 10.3–14.5)
SODIUM SERPL-SCNC: 134 MMOL/L — LOW (ref 135–145)
TROPONIN T, HIGH SENSITIVITY: 8 NG/L — SIGNIFICANT CHANGE UP (ref ?–14)
TROPONIN T, HIGH SENSITIVITY: < 6 NG/L — SIGNIFICANT CHANGE UP (ref ?–14)
WBC # BLD: 7.84 K/UL — SIGNIFICANT CHANGE UP (ref 3.8–10.5)
WBC # FLD AUTO: 7.84 K/UL — SIGNIFICANT CHANGE UP (ref 3.8–10.5)

## 2020-08-28 PROCEDURE — 99284 EMERGENCY DEPT VISIT MOD MDM: CPT

## 2020-08-28 PROCEDURE — 71046 X-RAY EXAM CHEST 2 VIEWS: CPT | Mod: 26

## 2020-08-28 RX ORDER — AMLODIPINE BESYLATE 2.5 MG/1
10 TABLET ORAL ONCE
Refills: 0 | Status: COMPLETED | OUTPATIENT
Start: 2020-08-28 | End: 2020-08-28

## 2020-08-28 RX ORDER — POTASSIUM CHLORIDE 20 MEQ
40 PACKET (EA) ORAL ONCE
Refills: 0 | Status: COMPLETED | OUTPATIENT
Start: 2020-08-28 | End: 2020-08-28

## 2020-08-28 RX ADMIN — AMLODIPINE BESYLATE 10 MILLIGRAM(S): 2.5 TABLET ORAL at 03:25

## 2020-08-28 RX ADMIN — Medication 40 MILLIEQUIVALENT(S): at 04:51

## 2020-08-28 NOTE — ED ADULT NURSE NOTE - OBJECTIVE STATEMENT
received pt to rm 22 A&ox4, ambulatory. 46y/o female hx of HTN takes losartan is complaining of palpitations. pt states she felt like her heart "was beating really fast and it woke me up from my sleep. I also feel like I stopped breathing for a moment and my oxygen was low." Upon arrival pt BP noted to be elevated, pt states she missed her BP med dose yesterday night. Resps even and unlabored, spo2 100% on RA. Denies chest pain, abdominal pain, fever/chills, SOB, nausea/vomiting. pt resting comfortably, NSR on cardiac monitor. MD Reeves at bedside for eval.

## 2020-08-28 NOTE — ED PROVIDER NOTE - CLINICAL SUMMARY MEDICAL DECISION MAKING FREE TEXT BOX
44F PMH HTN presents with palpitations. Likely sleep apnea as pt is obese, snoring, and often wakes up feeling like she cant breath. Was seen in July in ER for similar complaint. Had normal w/u including TSH. EKG today unchanged. Pt hypertensive as she missed home amlodipine. Will recheck labs and EKG. Likely dc home with cards and pulm f/u

## 2020-08-28 NOTE — ED PROVIDER NOTE - OBJECTIVE STATEMENT
44F PMH HTN presents with palpitations. Pt states that often during the night she will wake up feeling palpitations and sensation of not being able to breath. States she feels like she is having an asthma attack although has no h/o asthma. States past few days has also intermittently had palpitations/SOB during the day (non-exertional). States she went to [facility] for a covid test but has not received results. No fevers. No cough. No chest pain. No n/v/d, no loss of smell taste. Pt states that she is very loud snorer.

## 2020-08-28 NOTE — ED PROVIDER NOTE - NSFOLLOWUPINSTRUCTIONS_ED_ALL_ED_FT
Please follow up with a cardiologist for your palpitations as well as a pulmonologist for a sleep study     1. TAKE ALL MEDICATIONS AS DIRECTED.    2. FOR PAIN OR FEVER YOU CAN TAKE IBUPROFEN (MOTRIN, ADVIL) OR ACETAMINOPHEN (TYLENOL) AS NEEDED, AS DIRECTED ON PACKAGING.  3. FOLLOW UP WITH YOUR PRIMARY DOCTOR WITHIN 5 DAYS AS DIRECTED.  4. IF YOU HAD LABS OR IMAGING DONE, YOU WERE GIVEN COPIES OF ALL LABS AND/OR IMAGING RESULTS FROM YOUR ER VISIT--PLEASE TAKE THEM WITH YOU TO YOUR FOLLOW UP APPOINTMENTS.  5. IF NEEDED, CALL PATIENT ACCESS SERVICES AT [phone] TO FIND A PRIMARY CARE PHYSICIAN.  OR CALL [phone] TO MAKE AN APPOINTMENT WITH THE CLINIC.  6. RETURN TO THE ER FOR ANY WORSENING SYMPTOMS OR CONCERNS.    Palpitations    A palpitation is the feeling that your heartbeat is irregular or is faster than normal. It may feel like your heart is fluttering or skipping a beat. They may be caused by many things, including smoking, caffeine, alcohol, stress, and certain medicines. Although most causes of palpitations are not serious, palpitations can be a sign of a serious medical problem. Avoid caffeine, alcohol, and tobacco products at home. Try to reduce stress and anxiety and make sure to get plenty of rest.     SEEK IMMEDIATE MEDICAL CARE IF YOU HAVE ANY OF THE FOLLOWING SYMPTOMS: chest pain, shortness of breath, severe headache, dizziness/lightheadedness, or fainting.

## 2020-08-28 NOTE — ED PROVIDER NOTE - PROGRESS NOTE DETAILS
Pt labs normal. Discussed low K+ likely HTN med side effect. Recommended outpatient f/u with cards/pulm. results dw pt/family, questions answered. Return precautions given. Mychal Reeves M.D. PGY-3

## 2020-08-28 NOTE — ED PROVIDER NOTE - PATIENT PORTAL LINK FT
You can access the FollowMyHealth Patient Portal offered by [facility] by registering at the following website: [email or web address]. By joining [facility]’s FollowMyHealth portal, you will also be able to view your health information using other applications (apps) compatible with our system.

## 2020-08-28 NOTE — ED ADULT TRIAGE NOTE - CHIEF COMPLAINT QUOTE
Pt st" Tonight I woke up felt short of breath and my heart was racing... I thought I maybe having reaction to the airconditioning and or  not sure if Im allergic to my  dog....because for few months I  been gettting  shortness of breath. I went this Tuesday to test for Covid at [facility] don't have results." Denies fevers and cough denies any sick contacts.  Pt appears anxious Resp even & unlabored. HX HTN " I am taking losartan took last dose at 11pm ....Im on  another pill for BP I started month ago."

## 2020-08-28 NOTE — ED PROVIDER NOTE - NSFOLLOWUPCLINICS_GEN_ALL_ED_FT
[facility]  Cardiology  [address] [place] [id number]  Phone: [phone]  Fax:   Follow Up Time:     [facility]  Pulmonology  [address] [place] [id number]  Phone: [phone]  Fax:   Follow Up Time:

## 2020-08-28 NOTE — ED ADULT NURSE REASSESSMENT NOTE - NS ED NURSE REASSESS COMMENT FT1
pt okay for discharge at this time. Discharge instructions given, IV taken out. Pt ambulatory to exit with a steady gait.

## 2020-08-28 NOTE — ED PROVIDER NOTE - NS ED ROS FT
REVIEW OF SYSTEMS:  General:  no fever, no chills  HEENT: no headache, no vision changes  Cardiac: no chest pain, +palpitations  Respiratory: no cough, +shortness of breath  Gastrointestinal: no abdominal pain, no nausea, no vomiting, no diarrhea  Genitourinary: no hematuria, no dysuria, no urinary frequency  Extremities: no extremity swelling, no extremity pain  Neuro: no focal weakness, no numbness/tingling of the extremities, no decreased sensation  Heme: no easy bleeding, no easy bruising  Skin: no jaundice,  no rashes, no lesions  All other ROS as documented in HPI  -Simon Reeves, PGY-3

## 2020-08-28 NOTE — ED PROVIDER NOTE - PHYSICAL EXAMINATION
General: Well developed, obese female  HEENT: Normocephalic and atraumatic, Trachea midline.   Cardiac: Normal S1 and S2 w/ RRR. No MRG.  Pulmonary: CTA bilaterally. No increased WOB.   Abdominal: Soft, NTND  Neurologic: No focal sensory or motor deficits.  Musculoskeletal: No limited ROM.  Vascular: Warm and well perfused  Skin: Color appropriate for race.   Psychiatric: Appropriate mood and affect. No apparent risk to self or others.  Simon Reeves M.D. PGY-3

## 2020-08-28 NOTE — ED PROVIDER NOTE - ATTENDING CONTRIBUTION TO CARE
pt w/ palpitations now resolved, worse after sleeping, likely sleep apnea. will check basics + ekg + cxr, reassess,   GEN - NAD; well appearing; A+O x3   HEAD - NC/AT     EYES - EOMI, no conjunctival pallor, no scleral icterus  ENT -   mucous membranes  moist , no discharge      NECK - Neck supple  PULM - CTA b/l,  symmetric breath sounds  COR -  RRR, S1 S2, no murmurs  ABD - , ND, NT, soft, no guarding, no rebound, no masses    BACK - no CVA tenderness, nontender spine     EXTREMS - no edema, no deformity, warm and well perfused    SKIN - no rash or bruising      NEUROLOGIC - alert, sensation nl, motor 5/5 RUE/LUE/RLE/LLE

## 2020-09-09 ENCOUNTER — OUTPATIENT (OUTPATIENT)
Dept: OUTPATIENT SERVICES | Facility: HOSPITAL | Age: 45
LOS: 1 days | End: 2020-09-09

## 2020-09-09 ENCOUNTER — APPOINTMENT (OUTPATIENT)
Dept: INTERNAL MEDICINE | Facility: CLINIC | Age: 45
End: 2020-09-09
Payer: MEDICAID

## 2020-09-09 ENCOUNTER — APPOINTMENT (OUTPATIENT)
Dept: INTERNAL MEDICINE | Facility: CLINIC | Age: 45
End: 2020-09-09

## 2020-09-09 VITALS
HEART RATE: 96 BPM | RESPIRATION RATE: 14 BRPM | OXYGEN SATURATION: 98 % | DIASTOLIC BLOOD PRESSURE: 100 MMHG | HEART RATE: 96 BPM | HEIGHT: 58 IN | RESPIRATION RATE: 22 BRPM | OXYGEN SATURATION: 98 % | BODY MASS INDEX: 41.38 KG/M2 | HEIGHT: 58 IN | BODY MASS INDEX: 41.38 KG/M2 | DIASTOLIC BLOOD PRESSURE: 100 MMHG | SYSTOLIC BLOOD PRESSURE: 158 MMHG | SYSTOLIC BLOOD PRESSURE: 158 MMHG | WEIGHT: 197.13 LBS | WEIGHT: 197.13 LBS

## 2020-09-09 DIAGNOSIS — Z91.89 OTHER SPECIFIED PERSONAL RISK FACTORS, NOT ELSEWHERE CLASSIFIED: ICD-10-CM

## 2020-09-09 DIAGNOSIS — Z80.0 FAMILY HISTORY OF MALIGNANT NEOPLASM OF DIGESTIVE ORGANS: ICD-10-CM

## 2020-09-09 DIAGNOSIS — R14.0 ABDOMINAL DISTENSION (GASEOUS): ICD-10-CM

## 2020-09-09 DIAGNOSIS — I10 ESSENTIAL (PRIMARY) HYPERTENSION: ICD-10-CM

## 2020-09-09 DIAGNOSIS — Z83.71 FAMILY HISTORY OF COLONIC POLYPS: ICD-10-CM

## 2020-09-09 DIAGNOSIS — G47.39 OTHER SLEEP APNEA: ICD-10-CM

## 2020-09-09 PROCEDURE — 99213 OFFICE O/P EST LOW 20 MIN: CPT

## 2020-09-09 NOTE — PHYSICAL EXAM
[No Acute Distress] : no acute distress [Well-Appearing] : well-appearing [Well Developed] : well developed [Normal Voice/Communication] : normal voice/communication [Normal Sclera/Conjunctiva] : normal sclera/conjunctiva [EOMI] : extraocular movements intact [No JVD] : no jugular venous distention [Normal Outer Ear/Nose] : the outer ears and nose were normal in appearance [No Accessory Muscle Use] : no accessory muscle use [No Respiratory Distress] : no respiratory distress  [Clear to Auscultation] : lungs were clear to auscultation bilaterally [Normal Rate] : normal rate  [Regular Rhythm] : with a regular rhythm [Normal S1, S2] : normal S1 and S2 [No Murmur] : no murmur heard [Soft] : abdomen soft [Coordination Grossly Intact] : coordination grossly intact [No Rash] : no rash [Non Tender] : non-tender [No Focal Deficits] : no focal deficits [Normal Gait] : normal gait [Memory Grossly Normal] : memory grossly normal [Speech Grossly Normal] : speech grossly normal [Normal Insight/Judgement] : insight and judgment were intact [de-identified] : obese [de-identified] : 1+ b/l ankle edema [de-identified] : anxious affect

## 2020-09-09 NOTE — ASSESSMENT
[FreeTextEntry1] : Health care maintenance: flu shot recommended, and patient is interested but needs to leave soon so cannot wait. Will have it at nursing visit for bloodwork. \par \par Return to work letter written and given to the patient. \par \par RTO 2 weeks

## 2020-09-09 NOTE — HISTORY OF PRESENT ILLNESS
[FreeTextEntry8] : 44 year old woman with h/o eczema and hypertension who presents for acute visit with multple concerns: \par -her brother was recently diagnosed with colon cancer at age 53 and was told that he has FAP, and is having colectomy soon, she wants to discuss her risk for colon cancer\par -went to the ED recently (8/28) with elevated BP, palpitations, and c/o waking up short of breath at night, has a little leg edema - notes that she saw podiatry outside [facility] recently and had MRI of her ankles and was told it was not a tumor\par -took losartan this morning but forgot to take losartan and amlodipine yesterday, planning on taking amlodipine when she gets home today\par -needs a back to work letter after visit today

## 2020-09-28 ENCOUNTER — LABORATORY RESULT (OUTPATIENT)
Age: 45
End: 2020-09-28

## 2020-09-28 ENCOUNTER — APPOINTMENT (OUTPATIENT)
Dept: INTERNAL MEDICINE | Facility: CLINIC | Age: 45
End: 2020-09-28

## 2020-09-28 ENCOUNTER — OUTPATIENT (OUTPATIENT)
Dept: OUTPATIENT SERVICES | Facility: HOSPITAL | Age: 45
LOS: 1 days | End: 2020-09-28

## 2020-09-28 VITALS — TEMPERATURE: 96.4 F

## 2020-09-28 DIAGNOSIS — I10 ESSENTIAL (PRIMARY) HYPERTENSION: ICD-10-CM

## 2020-09-28 DIAGNOSIS — Z23 ENCOUNTER FOR IMMUNIZATION: ICD-10-CM

## 2020-09-28 LAB
ANION GAP SERPL CALC-SCNC: 12 MMO/L — SIGNIFICANT CHANGE UP (ref 7–14)
BUN SERPL-MCNC: 12 MG/DL — SIGNIFICANT CHANGE UP (ref 7–23)
CALCIUM SERPL-MCNC: 9.5 MG/DL — SIGNIFICANT CHANGE UP (ref 8.4–10.5)
CHLORIDE SERPL-SCNC: 99 MMOL/L — SIGNIFICANT CHANGE UP (ref 98–107)
CO2 SERPL-SCNC: 25 MMOL/L — SIGNIFICANT CHANGE UP (ref 22–31)
CREAT ?TM UR-MCNC: 459.6 MG/DL — SIGNIFICANT CHANGE UP
CREAT SERPL-MCNC: 0.84 MG/DL — SIGNIFICANT CHANGE UP (ref 0.5–1.3)
GLUCOSE SERPL-MCNC: 92 MG/DL — SIGNIFICANT CHANGE UP (ref 70–99)
HBA1C BLD-MCNC: 5.6 % — SIGNIFICANT CHANGE UP (ref 4–5.6)
POTASSIUM SERPL-MCNC: 3.7 MMOL/L — SIGNIFICANT CHANGE UP (ref 3.5–5.3)
POTASSIUM SERPL-SCNC: 3.7 MMOL/L — SIGNIFICANT CHANGE UP (ref 3.5–5.3)
PROT UR-MCNC: 53.3 MG/DL — SIGNIFICANT CHANGE UP
SODIUM SERPL-SCNC: 136 MMOL/L — SIGNIFICANT CHANGE UP (ref 135–145)

## 2020-09-30 ENCOUNTER — APPOINTMENT (OUTPATIENT)
Dept: INTERNAL MEDICINE | Facility: CLINIC | Age: 45
End: 2020-09-30
Payer: MEDICAID

## 2020-09-30 ENCOUNTER — OUTPATIENT (OUTPATIENT)
Dept: OUTPATIENT SERVICES | Facility: HOSPITAL | Age: 45
LOS: 1 days | End: 2020-09-30

## 2020-09-30 VITALS — TEMPERATURE: 96.8 F

## 2020-09-30 VITALS
HEIGHT: 58 IN | SYSTOLIC BLOOD PRESSURE: 170 MMHG | BODY MASS INDEX: 41.56 KG/M2 | HEART RATE: 108 BPM | DIASTOLIC BLOOD PRESSURE: 100 MMHG | WEIGHT: 198 LBS | OXYGEN SATURATION: 99 %

## 2020-09-30 VITALS — DIASTOLIC BLOOD PRESSURE: 90 MMHG | SYSTOLIC BLOOD PRESSURE: 162 MMHG

## 2020-09-30 DIAGNOSIS — Z86.19 PERSONAL HISTORY OF OTHER INFECTIOUS AND PARASITIC DISEASES: ICD-10-CM

## 2020-09-30 DIAGNOSIS — Z87.01 PERSONAL HISTORY OF PNEUMONIA (RECURRENT): ICD-10-CM

## 2020-09-30 PROCEDURE — 99214 OFFICE O/P EST MOD 30 MIN: CPT

## 2020-09-30 RX ORDER — AMLODIPINE BESYLATE 10 MG/1
10 TABLET ORAL
Qty: 90 | Refills: 1 | Status: ACTIVE | COMMUNITY
Start: 2020-09-30 | End: 1900-01-01

## 2020-09-30 NOTE — COUNSELING
[Potential consequences of obesity discussed] : Potential consequences of obesity discussed [Benefits of weight loss discussed] : Benefits of weight loss discussed [Encouraged to increase physical activity] : Encouraged to increase physical activity [Target Wt Loss Goal ___] : Weight Loss Goals: Target weight loss goal [unfilled] lbs [Weigh Self Weekly] : weigh self weekly [Decrease Portions] : decrease portions [____ min/wk Activity] : [unfilled] min/wk activity

## 2020-10-07 DIAGNOSIS — Z23 ENCOUNTER FOR IMMUNIZATION: ICD-10-CM

## 2020-10-07 DIAGNOSIS — E66.01 MORBID (SEVERE) OBESITY DUE TO EXCESS CALORIES: ICD-10-CM

## 2020-10-07 DIAGNOSIS — I10 ESSENTIAL (PRIMARY) HYPERTENSION: ICD-10-CM

## 2020-10-07 DIAGNOSIS — Z87.01 PERSONAL HISTORY OF PNEUMONIA (RECURRENT): ICD-10-CM

## 2020-10-07 DIAGNOSIS — G47.39 OTHER SLEEP APNEA: ICD-10-CM

## 2020-10-07 DIAGNOSIS — Z91.89 OTHER SPECIFIED PERSONAL RISK FACTORS, NOT ELSEWHERE CLASSIFIED: ICD-10-CM

## 2020-10-07 NOTE — ASSESSMENT
[FreeTextEntry1] : Healthcare maintenance:\par Received flu shot on September 28, 2020\par Will receive Tdap today\par As above, will receive PCV 13 today, with plan for PPSV23 in 12 months.\par We discussed that she is due for Pap smear screening for cervical cancer this year–we discussed the risks and benefits of such screening and she will consider Pap smear and let me know at the next visit.\par \par RTO 3 months for face-to-face visit with Dr. Ochoa

## 2020-10-07 NOTE — PHYSICAL EXAM
[No Acute Distress] : no acute distress [Well Nourished] : well nourished [Well Developed] : well developed [Well-Appearing] : well-appearing [Normal Voice/Communication] : normal voice/communication [Normal Sclera/Conjunctiva] : normal sclera/conjunctiva [EOMI] : extraocular movements intact [No JVD] : no jugular venous distention [No Respiratory Distress] : no respiratory distress  [No Accessory Muscle Use] : no accessory muscle use [Clear to Auscultation] : lungs were clear to auscultation bilaterally [Normal Rate] : normal rate  [Regular Rhythm] : with a regular rhythm [Normal S1, S2] : normal S1 and S2 [No Joint Swelling] : no joint swelling [Grossly Normal Strength/Tone] : grossly normal strength/tone [Normal Gait] : normal gait [Speech Grossly Normal] : speech grossly normal [Memory Grossly Normal] : memory grossly normal [de-identified] : obese  [de-identified] : mild b/l ankle edema [de-identified] : anxious affect

## 2020-10-07 NOTE — HISTORY OF PRESENT ILLNESS
[FreeTextEntry1] : follow up hypertension [de-identified] : 44 year old woman with h/o eczema and hypertension who presents follow up chronic medical conditions including hypertension\par -has been taking losartan 100mg and amlodipine 10mg daily but running out of her losartan and did not take it today. denies HA, SOB, CP. Still with some ankle edema, persistent. she is worried about the edema \par -has not yet scheduled with GI (CRC screening, brother dx age 53 recently) or sleep medicine (r/o DOC)\par -has been anxious and somewhat sad about her brother's diagnosis and what it could mean for herself and her sister, but denies depression or SI. has not been working on weight loss\par -got flu shot 9/28, no side effects\par -inquires about pneumococcal vaccine because she had pneumococcal PNA in 6/2019 - was admitted at [facility] and records show she had bacteremia. not vaccinated with either pneumococcal vaccine in the past\par \par Review of Systems: \par Denies night sweats, chills, recent change in weight, sore throat, cough, shortness of breath, chest pain, orthopnea, PND, abdominal pain, nausea, vomiting, diarrhea, constipation, heartburn, blood in stool or black stools, dysuria, hematuria, urinary hesitancy, nocturia, urinary frequency, urinary urgency, incontinence of stool or urine, skin rash, muscle weakness, headache, dizziness, insomnia, bleeding, or bruising. +anxiety\par \par Remainder of ROS reviewed and found to be negative.

## 2020-10-22 ENCOUNTER — EMERGENCY (EMERGENCY)
Facility: HOSPITAL | Age: 45
LOS: 1 days | Discharge: HOME | End: 2020-10-22
Attending: EMERGENCY MEDICINE | Admitting: EMERGENCY MEDICINE
Payer: MEDICAID

## 2020-10-22 VITALS
DIASTOLIC BLOOD PRESSURE: 114 MMHG | OXYGEN SATURATION: 97 % | TEMPERATURE: 98 F | HEART RATE: 87 BPM | RESPIRATION RATE: 16 BRPM | HEIGHT: 58 IN | SYSTOLIC BLOOD PRESSURE: 214 MMHG

## 2020-10-22 VITALS — DIASTOLIC BLOOD PRESSURE: 108 MMHG | HEART RATE: 80 BPM | SYSTOLIC BLOOD PRESSURE: 173 MMHG

## 2020-10-22 LAB
ALBUMIN SERPL ELPH-MCNC: 4.3 G/DL — SIGNIFICANT CHANGE UP (ref 3.3–5)
ALP SERPL-CCNC: 77 U/L — SIGNIFICANT CHANGE UP (ref 40–120)
ALT FLD-CCNC: 15 U/L — SIGNIFICANT CHANGE UP (ref 4–33)
ANION GAP SERPL CALC-SCNC: 11 MMO/L — SIGNIFICANT CHANGE UP (ref 7–14)
AST SERPL-CCNC: 20 U/L — SIGNIFICANT CHANGE UP (ref 4–32)
BASOPHILS # BLD AUTO: 0.07 K/UL — SIGNIFICANT CHANGE UP (ref 0–0.2)
BASOPHILS NFR BLD AUTO: 1 % — SIGNIFICANT CHANGE UP (ref 0–2)
BILIRUB SERPL-MCNC: 0.4 MG/DL — SIGNIFICANT CHANGE UP (ref 0.2–1.2)
BUN SERPL-MCNC: 15 MG/DL — SIGNIFICANT CHANGE UP (ref 7–23)
CALCIUM SERPL-MCNC: 8.9 MG/DL — SIGNIFICANT CHANGE UP (ref 8.4–10.5)
CHLORIDE SERPL-SCNC: 104 MMOL/L — SIGNIFICANT CHANGE UP (ref 98–107)
CO2 SERPL-SCNC: 24 MMOL/L — SIGNIFICANT CHANGE UP (ref 22–31)
CREAT SERPL-MCNC: 0.87 MG/DL — SIGNIFICANT CHANGE UP (ref 0.5–1.3)
EOSINOPHIL # BLD AUTO: 0.17 K/UL — SIGNIFICANT CHANGE UP (ref 0–0.5)
EOSINOPHIL NFR BLD AUTO: 2.4 % — SIGNIFICANT CHANGE UP (ref 0–6)
GLUCOSE SERPL-MCNC: 100 MG/DL — HIGH (ref 70–99)
HCT VFR BLD CALC: 39.5 % — SIGNIFICANT CHANGE UP (ref 34.5–45)
HGB BLD-MCNC: 13 G/DL — SIGNIFICANT CHANGE UP (ref 11.5–15.5)
IMM GRANULOCYTES NFR BLD AUTO: 0.3 % — SIGNIFICANT CHANGE UP (ref 0–1.5)
LIDOCAIN IGE QN: 20.4 U/L — SIGNIFICANT CHANGE UP (ref 7–60)
LYMPHOCYTES # BLD AUTO: 0.95 K/UL — LOW (ref 1–3.3)
LYMPHOCYTES # BLD AUTO: 13.2 % — SIGNIFICANT CHANGE UP (ref 13–44)
MCHC RBC-ENTMCNC: 26.9 PG — LOW (ref 27–34)
MCHC RBC-ENTMCNC: 32.9 % — SIGNIFICANT CHANGE UP (ref 32–36)
MCV RBC AUTO: 81.6 FL — SIGNIFICANT CHANGE UP (ref 80–100)
MONOCYTES # BLD AUTO: 0.7 K/UL — SIGNIFICANT CHANGE UP (ref 0–0.9)
MONOCYTES NFR BLD AUTO: 9.8 % — SIGNIFICANT CHANGE UP (ref 2–14)
NEUTROPHILS # BLD AUTO: 5.26 K/UL — SIGNIFICANT CHANGE UP (ref 1.8–7.4)
NEUTROPHILS NFR BLD AUTO: 73.3 % — SIGNIFICANT CHANGE UP (ref 43–77)
NRBC # FLD: 0 K/UL — SIGNIFICANT CHANGE UP (ref 0–0)
PLATELET # BLD AUTO: 350 K/UL — SIGNIFICANT CHANGE UP (ref 150–400)
PMV BLD: 9.6 FL — SIGNIFICANT CHANGE UP (ref 7–13)
POTASSIUM SERPL-MCNC: 3.6 MMOL/L — SIGNIFICANT CHANGE UP (ref 3.5–5.3)
POTASSIUM SERPL-SCNC: 3.6 MMOL/L — SIGNIFICANT CHANGE UP (ref 3.5–5.3)
PROT SERPL-MCNC: 7.5 G/DL — SIGNIFICANT CHANGE UP (ref 6–8.3)
RBC # BLD: 4.84 M/UL — SIGNIFICANT CHANGE UP (ref 3.8–5.2)
RBC # FLD: 13.5 % — SIGNIFICANT CHANGE UP (ref 10.3–14.5)
SODIUM SERPL-SCNC: 139 MMOL/L — SIGNIFICANT CHANGE UP (ref 135–145)
WBC # BLD: 7.17 K/UL — SIGNIFICANT CHANGE UP (ref 3.8–10.5)
WBC # FLD AUTO: 7.17 K/UL — SIGNIFICANT CHANGE UP (ref 3.8–10.5)

## 2020-10-22 PROCEDURE — 71045 X-RAY EXAM CHEST 1 VIEW: CPT | Mod: 26

## 2020-10-22 PROCEDURE — 76705 ECHO EXAM OF ABDOMEN: CPT | Mod: 26

## 2020-10-22 PROCEDURE — 93010 ELECTROCARDIOGRAM REPORT: CPT

## 2020-10-22 PROCEDURE — 99284 EMERGENCY DEPT VISIT MOD MDM: CPT | Mod: 25

## 2020-10-22 RX ORDER — FAMOTIDINE 10 MG/ML
20 INJECTION INTRAVENOUS ONCE
Refills: 0 | Status: COMPLETED | OUTPATIENT
Start: 2020-10-22 | End: 2020-10-22

## 2020-10-22 RX ORDER — HYDRALAZINE HCL 50 MG
10 TABLET ORAL ONCE
Refills: 0 | Status: COMPLETED | OUTPATIENT
Start: 2020-10-22 | End: 2020-10-22

## 2020-10-22 RX ORDER — SUCRALFATE 1 G
1 TABLET ORAL ONCE
Refills: 0 | Status: COMPLETED | OUTPATIENT
Start: 2020-10-22 | End: 2020-10-22

## 2020-10-22 RX ORDER — SODIUM CHLORIDE 9 MG/ML
1000 INJECTION INTRAMUSCULAR; INTRAVENOUS; SUBCUTANEOUS ONCE
Refills: 0 | Status: COMPLETED | OUTPATIENT
Start: 2020-10-22 | End: 2020-10-22

## 2020-10-22 RX ADMIN — Medication 10 MILLIGRAM(S): at 12:28

## 2020-10-22 RX ADMIN — Medication 30 MILLILITER(S): at 11:17

## 2020-10-22 RX ADMIN — SODIUM CHLORIDE 1000 MILLILITER(S): 9 INJECTION INTRAMUSCULAR; INTRAVENOUS; SUBCUTANEOUS at 11:18

## 2020-10-22 RX ADMIN — Medication 10 MILLIGRAM(S): at 11:43

## 2020-10-22 RX ADMIN — FAMOTIDINE 20 MILLIGRAM(S): 10 INJECTION INTRAVENOUS at 11:18

## 2020-10-22 RX ADMIN — Medication 1 GRAM(S): at 11:18

## 2020-10-22 NOTE — ED PROVIDER NOTE - PROGRESS NOTE DETAILS
Awais Quezada MD. bp improved to 170s systolic. pt reports subjective improvement of her pain. US RUQ negative. Labs reviewed. Pt to be discharged. advised to f/u with her GI for colonoscopy. return precautiosn provided.

## 2020-10-22 NOTE — ED ADULT NURSE NOTE - OBJECTIVE STATEMENT
Pt aa&ox4 PMH HTN presenting to ED for generalized abdominal pain starting 3 weeks ago. Pt states that after eating "something bad" 3 weeks ago she began to have abdominal bloating and diarrhea. Pt states diarrhea has since subsided. Pt states brother was recently dx with colon CA and she is worried. Pt recently seen GI and started on Protonix. Pt denies nausea/vomiting/ blood in stool/ cp/fever. Pt abdomen soft nontender nondistended. 20g IV placed in RT AC, labs sent. Will monitor.

## 2020-10-22 NOTE — ED ADULT TRIAGE NOTE - CHIEF COMPLAINT QUOTE
Pt. c/o generalized abdominal pain worsening over 2 wks. Started on Protonix but only took it today. Denies n/v/d or fevers. States her brother was just recently diagnosed with colon ca so pt. is concerned. h/o HTN states she did not take her meds this AM.

## 2020-10-22 NOTE — ED PROVIDER NOTE - NSFOLLOWUPINSTRUCTIONS_ED_ALL_ED_FT
You may take Acetaminophen over the counter as needed for pain and/or fever. Use as directed and see medication warnings.    Please take over the counter Pepcid as needed for pain. Use as directed and see medication warnings.    Please continue with Protonix, which was prescribed to you by your gastroenterologist.    Please follow up with your primary care physician, as well as your gastroenterologist, especially regarding your scheduled colonoscopy in December 2020.    Please bring a copy of your results with you.    Please return to the emergency department for worsening of your symptoms.

## 2020-10-22 NOTE — ED PROVIDER NOTE - PHYSICAL EXAMINATION
PHYSICAL EXAM:  GENERAL: non-toxic appearing; in no respiratory distress  HEAD Atraumatic, Normocephalic  NECK: No JVD; FROM  EYES: PERRL, EOMs intact b/l w/out deficits  CHEST/LUNG: CTAB no wheezes/rhonchi/rales  HEART: RRR no murmur/gallops/rubs  ABDOMEN: +BS, soft, ND; tender mildly to epigastric region; no RUQ tenderness;   EXTREMITIES: No LE edema, +2 radial pulses b/l, +2 DP/PT pulses b/l  MUSCULOSKELETAL: FROM of all 4 extremities;  NERVOUS SYSTEM:  A&Ox3, No motor deficits or sensory deficits; CNII-XII intact; no focal neurologic deficits  SKIN:  No new rashes

## 2020-10-22 NOTE — ED PROVIDER NOTE - CLINICAL SUMMARY MEDICAL DECISION MAKING FREE TEXT BOX
Awais Quezada MD. 44 F pmhx htn prseents for epigastric abd bloating x2-3 weeks, nausea. anxiosu about brother's recent dx of colon ca. went to GI yesterday, started on protonix yesterday, scheduled for colonoscopy dec 2020. pt statse she hasn't been taking her BP meds regularly due to anxiety and nausea but did take them this AM. pt hypertensive here. abd soft, tender to midepigastrium; no ruq tenderness; ddx gastritis, pancreatitis, acs equivalent w/ nausea. will obtain labs, cxr, ekg, reassess.

## 2020-10-22 NOTE — ED PROCEDURE NOTE - PROCEDURE ADDITIONAL DETAILS
Focused ED Ultrasound RUQ 80300 Indication abdominal pain  Grey scale RUQ views obtained in saggital and transverse planes.   No wall thickening, no gallbladder wall edema, and no pericholecystic fluid seen.  Negative sonographic lewis's.  No gallstones.    anterior gallbladder wall - 1.9mm  CBD - 2.3mm    Impression:  normal gallbladder.    Dexeus [id number]

## 2020-10-22 NOTE — ED PROVIDER NOTE - OBJECTIVE STATEMENT
44 F PMHx HTN (losartan, hctz), presents to ED for abd pain x2-3 weeks, described as bloating sensation especially in epigastrium, worsening today. pt went to her GI yesterday, was started on protonix yesterday. pt also c/o nausea. pt is also anxious about her brother (54 yo) who was recentrly dx'd with colon CA. pt is scheduled for colonoscopy in December 2020. denies vomiting, fever, cp, sob, radiation of her abd pain, brbpr, melena, dysuria, weight loss, vaginal bleeding.

## 2020-12-16 ENCOUNTER — NON-APPOINTMENT (OUTPATIENT)
Age: 45
End: 2020-12-16

## 2020-12-16 ENCOUNTER — APPOINTMENT (OUTPATIENT)
Dept: INTERNAL MEDICINE | Facility: CLINIC | Age: 45
End: 2020-12-16

## 2020-12-16 ENCOUNTER — OUTPATIENT (OUTPATIENT)
Dept: OUTPATIENT SERVICES | Facility: HOSPITAL | Age: 45
LOS: 1 days | End: 2020-12-16

## 2021-03-24 ENCOUNTER — EMERGENCY (EMERGENCY)
Facility: HOSPITAL | Age: 46
LOS: 1 days | Discharge: HOME | End: 2021-03-24
Attending: EMERGENCY MEDICINE
Payer: MEDICAID

## 2021-03-24 VITALS
SYSTOLIC BLOOD PRESSURE: 127 MMHG | OXYGEN SATURATION: 100 % | DIASTOLIC BLOOD PRESSURE: 81 MMHG | TEMPERATURE: 99 F | WEIGHT: 190.04 LBS | HEIGHT: 58 IN | RESPIRATION RATE: 18 BRPM | HEART RATE: 70 BPM

## 2021-03-24 LAB
ALBUMIN SERPL ELPH-MCNC: 3.9 G/DL — SIGNIFICANT CHANGE UP (ref 3.3–5)
ALP SERPL-CCNC: 90 U/L — SIGNIFICANT CHANGE UP (ref 40–120)
ALT FLD-CCNC: 13 U/L — SIGNIFICANT CHANGE UP (ref 10–45)
ANION GAP SERPL CALC-SCNC: 12 MMOL/L — SIGNIFICANT CHANGE UP (ref 5–17)
APTT BLD: 35.6 SEC — HIGH (ref 27.5–35.5)
AST SERPL-CCNC: 16 U/L — SIGNIFICANT CHANGE UP (ref 10–40)
BASOPHILS # BLD AUTO: 0.09 K/UL — SIGNIFICANT CHANGE UP (ref 0–0.2)
BASOPHILS NFR BLD AUTO: 1.1 % — SIGNIFICANT CHANGE UP (ref 0–2)
BILIRUB SERPL-MCNC: 0.2 MG/DL — SIGNIFICANT CHANGE UP (ref 0.2–1.2)
BUN SERPL-MCNC: 13 MG/DL — SIGNIFICANT CHANGE UP (ref 7–23)
CALCIUM SERPL-MCNC: 8.5 MG/DL — SIGNIFICANT CHANGE UP (ref 8.4–10.5)
CHLORIDE SERPL-SCNC: 102 MMOL/L — SIGNIFICANT CHANGE UP (ref 96–108)
CO2 SERPL-SCNC: 21 MMOL/L — LOW (ref 22–31)
CREAT SERPL-MCNC: 0.93 MG/DL — SIGNIFICANT CHANGE UP (ref 0.5–1.3)
EOSINOPHIL # BLD AUTO: 0.28 K/UL — SIGNIFICANT CHANGE UP (ref 0–0.5)
EOSINOPHIL NFR BLD AUTO: 3.5 % — SIGNIFICANT CHANGE UP (ref 0–6)
GLUCOSE SERPL-MCNC: 98 MG/DL — SIGNIFICANT CHANGE UP (ref 70–99)
HCG SERPL-ACNC: <2 MIU/ML — SIGNIFICANT CHANGE UP
HCT VFR BLD CALC: 37 % — SIGNIFICANT CHANGE UP (ref 34.5–45)
HGB BLD-MCNC: 12 G/DL — SIGNIFICANT CHANGE UP (ref 11.5–15.5)
IMM GRANULOCYTES NFR BLD AUTO: 0.4 % — SIGNIFICANT CHANGE UP (ref 0–1.5)
INR BLD: 0.94 RATIO — SIGNIFICANT CHANGE UP (ref 0.88–1.16)
LYMPHOCYTES # BLD AUTO: 1.27 K/UL — SIGNIFICANT CHANGE UP (ref 1–3.3)
LYMPHOCYTES # BLD AUTO: 15.8 % — SIGNIFICANT CHANGE UP (ref 13–44)
MCHC RBC-ENTMCNC: 26.7 PG — LOW (ref 27–34)
MCHC RBC-ENTMCNC: 32.4 GM/DL — SIGNIFICANT CHANGE UP (ref 32–36)
MCV RBC AUTO: 82.4 FL — SIGNIFICANT CHANGE UP (ref 80–100)
MONOCYTES # BLD AUTO: 0.64 K/UL — SIGNIFICANT CHANGE UP (ref 0–0.9)
MONOCYTES NFR BLD AUTO: 8 % — SIGNIFICANT CHANGE UP (ref 2–14)
NEUTROPHILS # BLD AUTO: 5.74 K/UL — SIGNIFICANT CHANGE UP (ref 1.8–7.4)
NEUTROPHILS NFR BLD AUTO: 71.2 % — SIGNIFICANT CHANGE UP (ref 43–77)
NRBC # BLD: 0 /100 WBCS — SIGNIFICANT CHANGE UP (ref 0–0)
PLATELET # BLD AUTO: 352 K/UL — SIGNIFICANT CHANGE UP (ref 150–400)
POTASSIUM SERPL-MCNC: 3.4 MMOL/L — LOW (ref 3.5–5.3)
POTASSIUM SERPL-SCNC: 3.4 MMOL/L — LOW (ref 3.5–5.3)
PROT SERPL-MCNC: 7.2 G/DL — SIGNIFICANT CHANGE UP (ref 6–8.3)
PROTHROM AB SERPL-ACNC: 11.3 SEC — SIGNIFICANT CHANGE UP (ref 10.6–13.6)
RBC # BLD: 4.49 M/UL — SIGNIFICANT CHANGE UP (ref 3.8–5.2)
RBC # FLD: 12.8 % — SIGNIFICANT CHANGE UP (ref 10.3–14.5)
SODIUM SERPL-SCNC: 135 MMOL/L — SIGNIFICANT CHANGE UP (ref 135–145)
WBC # BLD: 8.05 K/UL — SIGNIFICANT CHANGE UP (ref 3.8–10.5)
WBC # FLD AUTO: 8.05 K/UL — SIGNIFICANT CHANGE UP (ref 3.8–10.5)

## 2021-03-24 PROCEDURE — 93970 EXTREMITY STUDY: CPT | Mod: 26

## 2021-03-24 PROCEDURE — 84702 CHORIONIC GONADOTROPIN TEST: CPT

## 2021-03-24 PROCEDURE — 99284 EMERGENCY DEPT VISIT MOD MDM: CPT | Mod: 25

## 2021-03-24 PROCEDURE — 85610 PROTHROMBIN TIME: CPT

## 2021-03-24 PROCEDURE — 36000 PLACE NEEDLE IN VEIN: CPT

## 2021-03-24 PROCEDURE — 80053 COMPREHEN METABOLIC PANEL: CPT

## 2021-03-24 PROCEDURE — 85025 COMPLETE CBC W/AUTO DIFF WBC: CPT

## 2021-03-24 PROCEDURE — 85730 THROMBOPLASTIN TIME PARTIAL: CPT

## 2021-03-24 PROCEDURE — 99285 EMERGENCY DEPT VISIT HI MDM: CPT

## 2021-03-24 PROCEDURE — 93970 EXTREMITY STUDY: CPT

## 2021-03-24 RX ORDER — LOSARTAN POTASSIUM 100 MG/1
100 TABLET, FILM COATED ORAL ONCE
Refills: 0 | Status: COMPLETED | OUTPATIENT
Start: 2021-03-24 | End: 2021-03-24

## 2021-03-24 RX ORDER — POTASSIUM CHLORIDE 20 MEQ
40 PACKET (EA) ORAL ONCE
Refills: 0 | Status: COMPLETED | OUTPATIENT
Start: 2021-03-24 | End: 2021-03-24

## 2021-03-24 RX ADMIN — LOSARTAN POTASSIUM 100 MILLIGRAM(S): 100 TABLET, FILM COATED ORAL at 23:29

## 2021-03-24 RX ADMIN — Medication 40 MILLIEQUIVALENT(S): at 23:29

## 2021-03-24 NOTE — ED PROVIDER NOTE - NSFOLLOWUPINSTRUCTIONS_ED_ALL_ED_FT
1) Follow-up with your primary care provider in 1-2 days.  2) Continue to take all medications as prescribed.  3) Wear compression stockings and elevate your feet when possible. Avoid standing for extended periods of time.  4) Return to the ER for any new or worsening symptoms.

## 2021-03-24 NOTE — ED PROVIDER NOTE - PATIENT PORTAL LINK FT
You can access the FollowMyHealth Patient Portal offered by [facility] by registering at the following website: [email or web address]. By joining [facility]’s FollowMyHealth portal, you will also be able to view your health information using other applications (apps) compatible with our system.

## 2021-03-24 NOTE — ED ADULT TRIAGE NOTE - ARRIVAL FROM
Nicolas Garcia   2017 1:00 PM   Nurse Only   MRN:  [id number]    Description:  Male : 1954   Department:  [facility] - Infusion              Visit Summary      Allergies     No Known Allergies      Your Vital Sig Speech Therapy Evaluation  Infant Feeding/Swallow     Admitting diagnosis: RDS (respiratory distress syndrome in the ) [P22.0]   YOB: 2019, 2 week old   Corrected gestational age:28w 5d  Birth Weight: 1 lb 11.2 oz (770 g)  Current hospitalization required due to the following medical needs:  Active Problems:    Prematurity, 750-999 grams, 25-26 completed weeks    RDS (respiratory distress syndrome in the )    Anemia of prematurity    Apnea of prematurity    S/P PDA repair     IVH (intraventricular hemorrhage), grade I     IVH (intraventricular hemorrhage), grade IV  Resolved Problems:    Observation and evaluation of  for suspected infectious condition    PDA (patent ductus arteriosus)    Medical changes or updates since last session: see physician documentation    SUBJECTIVE   Collaboration with: Nurse Hamilton.  Infant born at 25.6 weeks gestation now adjusted to 28.5 weeks PMA.     Pain before session:  N-PASS ( Pain, Agitation & Sedation Scale) Pain Assessment  Crying/Irritability 0 - No pain signs   Behavior State 0 - Appropriate for gestational age   Facial Expression  0 - No pain signs   Extremity Tone 0 - No pain signs   Vital Signs 0 - No pain signs   Premature Pain Score 0     OBJECTIVE   Speech Pathologist present to complete ongoing assessment/provide neuroprotection during medical cares.    Status at onset of session:   Physiologic: Stable autonomic behaviors including  stable heart rate and appropriate oxygen saturations.  Motor: Stable motor behaviors including  hand(s) to face. or Instability of the motor system as evidenced by  extension and finger splay/hault hands  State: Infant was in a light sleep state. Instability of state as evidenced by abrupt transitions between states.    Current Feeding: see documentation   Respiratory Status: see physician documentation   Pain during session:  N-PASS ( Pain, Agitation & Sedation Scale)  Pain Assessment  Crying/Irritability 0 - No pain signs   Behavior State 0 - Appropriate for gestational age   Facial Expression  0 - No pain signs   Extremity Tone 0 - No pain signs   Vital Signs 0 - No pain signs   Premature Pain Score 0      Treatment/Intervention    Speech Pathologist present during medical cares.  Infant with intermittent desaturations in conjunction with finger splaying/extension in the presence of containment during cares.  RN reports this as consistent with all other assessments.  No pooling secretions appreciated at baseline.  Of note, weak vocalization appreciated.  Oral swabs completed without active lingual movement; however, some labial movement noted.  All circumferential boundaries returned and vitals stable at completion of this assessment.  No further stimulation  Presented.     Therapy Techniques: Therapeutic pauses, Oral Swabs    Family Centered Support/Education: Caregiver(s) not present. Will meet and provide teaching strategies as available.       ASSESSMENT:     Impressions & Recommendations:  Aspiration Risk Moderate  Factors include: frequent stress cues, prematurity   Pacing n/a   Nipple n/a    Positioning Promote parent holding / positive touch   Safe feeding strategies found effective Oral swabs     Speech therapy will target safety of swallow function impacting patient's ability to meet daily nutrition/hydration needs via oral intake.    Pain after session:  N-PASS ( Pain, Agitation & Sedation Scale) Pain Assessment  Crying/Irritability 0 - No pain signs   Behavior State 0 - Appropriate for gestational age   Facial Expression  0 - No pain signs   Extremity Tone 0 - No pain signs   Vital Signs 0 - No pain signs   Premature Pain Score 0       PLAN:   Suggestions for next session as indicated: Speech Therapy to follow at least 1x/week.     Goals:  Goals  Discharge Goal #1: Parent/caregiver to independently implement supports/recommendations.  Discharge Goal #2 :  Infant to safely accept the least restrictive diet orally without clinical s/s of aspiration.  Short Term Goal #1: Infant to accept oral swabs without distress in 3/3 trials.  Short Term Goal #2: Infant to accept pacifier dips without distress in 3/3 trials.  Short Term Goal #3: Infant to participate in bottle trial when clinically indicated.    Frequency: Frequency: 1x    Documentation completed on following flowsheet: Infant flowsheet   Home

## 2021-03-24 NOTE — ED PROVIDER NOTE - RAPID ASSESSMENT
45y F with PMHx of HTN presents to the ED with c/o B/L leg swelling (L>R) with pain and warmth x 6 days. Pt is prescribed Losartan for her HTN, but did not take it today due to thinking that the Losartan was causing her leg swelling. Denies having blood clots in the past, smoking, recent travel or surgeries. Denies F/C and N/V. Patient is anxious and nervous in triage.    Amy WILKINSON (Onesimoibgabriel) have documented this rapid assessment note under the dictation of Lluvia DIAMOND) which has been reviewed and affirmed to be accurate. Patient was seen as a QPA patient. The patient will be seen and further worked up in the main emergency department and their care will be completed by the main emergency department team along with a thorough physical exam. Receiving team will follow up on labs, analgesia, any clinical imaging, reassess and disposition as clinically indicated, all decisions regarding the progression of care will be made at their discretion. 45y F with PMHx of HTN presents to the ED with c/o B/L leg swelling (L>R) with pain and warmth x 6 days. Pt is prescribed Losartan for her HTN, but did not take it today due to thinking that the Losartan was causing her leg swelling. Denies having blood clots in the past, smoking, recent travel or surgeries. Denies F/C and N/V. Patient is anxious and nervous in triage.    IAmy (Reinaldo) have documented this rapid assessment note under the dictation of Lluvia Preston (PA) which has been reviewed and affirmed to be accurate. Patient was seen as a QPA patient. The patient will be seen and further worked up in the main emergency department and their care will be completed by the main emergency department team along with a thorough physical exam. Receiving team will follow up on labs, analgesia, any clinical imaging, reassess and disposition as clinically indicated, all decisions regarding the progression of care will be made at their discretion.    **Pt seen, briefly, in the waiting room by Lluvia Preston PA-C for rapid assessment. documentation completed by Reinaldo Burrows Pt to be sent to main ED for full physical examination and assessment - all orders placed to be followed by MD in the main ED**

## 2021-03-24 NOTE — ED PROVIDER NOTE - CLINICAL SUMMARY MEDICAL DECISION MAKING FREE TEXT BOX
Ankle swelling, chronic on R and more acute (1 wk) on L.  No cp, sob, cough, n/v, ha, back pain, f/c.  No swelling proximal to ankles.  On losartan but noncompliant x last two days.  No h/o heart failure.  Labs/duplex ordered by qdoc has resulted; no actionable findings other than mild hypokalemia.  Symptoms likely 2/2 peripheral vascular insufficiency, less likely manifestation of congestive heart failure given lack of sob, orthopnea, fatigue, jugular venous distension, etc.  No PE on duplex.  Will give home dose losartan, supplement K, d/c c PCP f/u.  Reassurance provided to patient at length.  --BMM

## 2021-03-24 NOTE — ED PROVIDER NOTE - OBJECTIVE STATEMENT
45y F pmhx HTN on Losartan presents to ED c/o b/l ankle edema x 1 week. Pt has hx on RLE edema x 1 year, notice LLE edema 6 days ago and became concerned. Pt admits to being very anxious and was used 45y F pmhx HTN on Losartan presents to ED c/o b/l ankle edema x 1 week. Pt has hx on RLE edema x 1 year, notice LLE edema 6 days ago and became concerned. Denies falls/trauma, hx of blood clots, chest pain, SOB, smoking. Normal urine output. Pt admits to being anxious and was concerned when see was researching her symptoms online. Recent stressor in life - brother diagnosed w/ colon cancer.

## 2021-03-24 NOTE — ED PROVIDER NOTE - PHYSICAL EXAMINATION
GEN: Pt non-toxic in NAD, anxious appearing, A&Ox3.  PSYCH: Affect appropriate.  EYES: Sclera white w/o injection, EOMI, PERRLA.   ENT: Neck supple FROM. Airway patent.  RESP: No chest wall tenderness, CTA b/l, no wheezes, rales, or rhonchi.   CARDIAC: RRR, clear distinct S1, S2.  ABD: Abdomen soft, non-tender. No CVAT b/l.  MSK: Moving all extremities.  NEURO: No focal motor or sensory deficits.  VASC: Mild b/l nonpitting ankle edema. No calf tenderness. Radial and dorsalis pedis pulses 2+ b/l.  SKIN: No rashes or lesions.

## 2021-03-24 NOTE — ED PROVIDER NOTE - PROGRESS NOTE DETAILS
Awias Franks PA-C: All labs and imaging reviewed w/ pt. Pt understands to f/u with PMD. All questions answered.

## 2021-03-25 VITALS
TEMPERATURE: 99 F | RESPIRATION RATE: 20 BRPM | SYSTOLIC BLOOD PRESSURE: 161 MMHG | DIASTOLIC BLOOD PRESSURE: 99 MMHG | HEART RATE: 87 BPM | OXYGEN SATURATION: 100 %

## 2021-03-25 NOTE — ED ADULT NURSE NOTE - OBJECTIVE STATEMENT
Pt is a 46 y/o female c/o swelling of bilateral ankles. Pt states she has had swelling intermittently for past year, states right ankle has gotten worse. States she skipped bp meds night before r/t anxiety that it would make swelling worse, pt instructed to not skip medications anymore. Pt states "I'm very anxious because my brother has colon cancer and I'm scared things happening to my body could be a symptom of it for me". Denies CP, SOB, N/V/D, numbness, tingling, cough, fever, chills, dizziness, weakness, headache. A&Ox3. Breathing unlabored and spontaneous. Abdomen soft, nontender, nondistended. Full ROM and strength of extremities. Safety and comfort measures provided, call bell provided to pt and bed in lowest position.

## 2021-04-02 ENCOUNTER — NON-APPOINTMENT (OUTPATIENT)
Age: 46
End: 2021-04-02

## 2021-04-07 ENCOUNTER — NON-APPOINTMENT (OUTPATIENT)
Age: 46
End: 2021-04-07

## 2021-04-20 ENCOUNTER — APPOINTMENT (OUTPATIENT)
Dept: INTERNAL MEDICINE | Facility: CLINIC | Age: 46
End: 2021-04-20

## 2021-04-23 ENCOUNTER — NON-APPOINTMENT (OUTPATIENT)
Age: 46
End: 2021-04-23

## 2021-04-23 ENCOUNTER — APPOINTMENT (OUTPATIENT)
Dept: INTERNAL MEDICINE | Facility: CLINIC | Age: 46
End: 2021-04-23
Payer: MEDICAID

## 2021-04-23 ENCOUNTER — TRANSCRIPTION ENCOUNTER (OUTPATIENT)
Age: 46
End: 2021-04-23

## 2021-04-23 ENCOUNTER — OUTPATIENT (OUTPATIENT)
Dept: OUTPATIENT SERVICES | Facility: HOSPITAL | Age: 46
LOS: 1 days | End: 2021-04-23

## 2021-04-23 VITALS
HEIGHT: 58 IN | DIASTOLIC BLOOD PRESSURE: 120 MMHG | OXYGEN SATURATION: 98 % | HEART RATE: 91 BPM | WEIGHT: 195 LBS | SYSTOLIC BLOOD PRESSURE: 184 MMHG | BODY MASS INDEX: 40.93 KG/M2

## 2021-04-23 VITALS — TEMPERATURE: 94.4 F

## 2021-04-23 DIAGNOSIS — Z91.89 OTHER SPECIFIED PERSONAL RISK FACTORS, NOT ELSEWHERE CLASSIFIED: ICD-10-CM

## 2021-04-23 DIAGNOSIS — F41.9 ANXIETY DISORDER, UNSPECIFIED: ICD-10-CM

## 2021-04-23 DIAGNOSIS — Z00.00 ENCOUNTER FOR GENERAL ADULT MEDICAL EXAMINATION W/OUT ABNORMAL FINDINGS: ICD-10-CM

## 2021-04-23 DIAGNOSIS — L30.4 ERYTHEMA INTERTRIGO: ICD-10-CM

## 2021-04-23 PROCEDURE — 99214 OFFICE O/P EST MOD 30 MIN: CPT | Mod: GC
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
## 2021-04-25 NOTE — COUNSELING
[Behavioral health counseling provided] : Behavioral health counseling provided [Potential consequences of obesity discussed] : Potential consequences of obesity discussed [Benefits of weight loss discussed] : Benefits of weight loss discussed [Encouraged to maintain food diary] : Encouraged to maintain food diary [Encouraged to increase physical activity] : Encouraged to increase physical activity [Inadequate social support] : Inadequate social support [Transportation Issues] : Transportation issues [Needs reinforcement, provided] : Patient needs reinforcement on understanding of lifestyle changes and steps needed to achieve self management goal; reinforcement was provided [FreeTextEntry4] : 15

## 2021-04-25 NOTE — PLAN
[FreeTextEntry1] : \par Hypertension\par -In ED on 3/24 /119 2/2 reported medication nonadherence\par -Today /120 and unchanged on manual recheck\par -Continue Losartan 100mg and Amlodipine 10mg daily\par -Strongly advised daily BP checks, ongoing lifestyle modification\par -RTC 1-2 weeks or sooner if any concerns arise\par \par Anxiety\par -Likely IAD vs SSD vs [facility]\par -Patient would benefit from therapy and pharmacotherapy although with careful consideration of medical comorbidities\par -Behavioral health and psychiatry referrals provided\par \par Obesity\par -BMI ~41 with slight improvement recently\par -Encouraged ongoing lifestyle modifications\par -Nutritional counseling today\par \par HCM\par -Received Moderna vaccine 4/2, next 4/30\par -Otherwise immunizations UTD, PPSV due 9/2021\par -GYN and mammography referrals provided\par -Colonoscopy 12/20 without lesions, next recommended in 3 years for FHx FAP\par -Reprinted pulm referral for patient's concern about noisy breathing\par -No derm concerns on this visit\par \par RTC in 1-2 weeks for follow up\par Case discussed with Dr. Gotti\par \par Chencho Villegas MD\par Psychiatry PGY1\par \par \par

## 2021-04-25 NOTE — HEALTH RISK ASSESSMENT
[Intercurrent ED visits] : went to ED [Intercurrent Urgi Care visits] : went to urgent care [1 or 2 (0 pts)] : 1 or 2 (0 points) [Never (0 pts)] : Never (0 points) [No] : In the past 12 months have you used drugs other than those required for medical reasons? No [No falls in past year] : Patient reported no falls in the past year [0] : 2) Feeling down, depressed, or hopeless: Not at all (0) [] : No [Audit-CScore] : 0 [de-identified] : making an effort to eat more healthily including incorporating fruits and vegetables into her diet and hydrating appropriately [de-identified] : walking [[id number]] : 0

## 2021-04-25 NOTE — PHYSICAL EXAM
[No Acute Distress] : no acute distress [Well Nourished] : well nourished [Well Developed] : well developed [Well-Appearing] : well-appearing [Normal Sclera/Conjunctiva] : normal sclera/conjunctiva [PERRL] : pupils equal round and reactive to light [EOMI] : extraocular movements intact [Normal Outer Ear/Nose] : the outer ears and nose were normal in appearance [Normal Oropharynx] : the oropharynx was normal [No JVD] : no jugular venous distention [No Lymphadenopathy] : no lymphadenopathy [Supple] : supple [Thyroid Normal, No Nodules] : the thyroid was normal and there were no nodules present [No Respiratory Distress] : no respiratory distress  [No Accessory Muscle Use] : no accessory muscle use [Clear to Auscultation] : lungs were clear to auscultation bilaterally [Normal Rate] : normal rate  [Regular Rhythm] : with a regular rhythm [Normal S1, S2] : normal S1 and S2 [No Murmur] : no murmur heard [No Carotid Bruits] : no carotid bruits [No Abdominal Bruit] : a ~M bruit was not heard ~T in the abdomen [No Varicosities] : no varicosities [Pedal Pulses Present] : the pedal pulses are present [No Edema] : there was no peripheral edema [No Palpable Aorta] : no palpable aorta [No Extremity Clubbing/Cyanosis] : no extremity clubbing/cyanosis [Soft] : abdomen soft [Non Tender] : non-tender [Non-distended] : non-distended [No Masses] : no abdominal mass palpated [No HSM] : no HSM [Normal Bowel Sounds] : normal bowel sounds [Normal Posterior Cervical Nodes] : no posterior cervical lymphadenopathy [Normal Anterior Cervical Nodes] : no anterior cervical lymphadenopathy [No CVA Tenderness] : no CVA  tenderness [No Spinal Tenderness] : no spinal tenderness [No Joint Swelling] : no joint swelling [Grossly Normal Strength/Tone] : grossly normal strength/tone [No Rash] : no rash [Coordination Grossly Intact] : coordination grossly intact [No Focal Deficits] : no focal deficits [Normal Gait] : normal gait [Deep Tendon Reflexes (DTR)] : deep tendon reflexes were 2+ and symmetric [Normal Insight/Judgement] : insight and judgment were intact [Speech Grossly Normal] : speech grossly normal [Memory Grossly Normal] : memory grossly normal [Alert and Oriented x3] : oriented to person, place, and time [Appropriate] : appropriate [Racing Thoughts] : racing thoughts [Anxious] : anxious [Perseveration] : thought perseveration [Circumferential] : circumferentiality [Delusions] : exhibited no delusions [Hallucinations] : exhibited no hallucinations [Suicidal Ideation] : denied suicidal ideation [Suicidal Intent] : denied suicidal intent [Suicidal Plan] : denied suicidal plans [Homicidal Ideation] : denied homicidal ideation [Homicidal Intent] : denied homicidal intention [Homicidal Plan] : denied homicidal plans

## 2021-04-26 DIAGNOSIS — L30.4 ERYTHEMA INTERTRIGO: ICD-10-CM

## 2021-04-26 DIAGNOSIS — E66.9 OBESITY, UNSPECIFIED: ICD-10-CM

## 2021-04-26 DIAGNOSIS — I10 ESSENTIAL (PRIMARY) HYPERTENSION: ICD-10-CM

## 2021-04-26 DIAGNOSIS — G47.39 OTHER SLEEP APNEA: ICD-10-CM

## 2021-04-26 DIAGNOSIS — F41.9 ANXIETY DISORDER, UNSPECIFIED: ICD-10-CM

## 2021-04-26 DIAGNOSIS — Z91.89 OTHER SPECIFIED PERSONAL RISK FACTORS, NOT ELSEWHERE CLASSIFIED: ICD-10-CM

## 2021-04-26 DIAGNOSIS — E66.01 MORBID (SEVERE) OBESITY DUE TO EXCESS CALORIES: ICD-10-CM

## 2021-04-30 ENCOUNTER — TRANSCRIPTION ENCOUNTER (OUTPATIENT)
Age: 46
End: 2021-04-30

## 2021-06-08 NOTE — ED ADULT TRIAGE NOTE - NS ED TRIAGE AVPU SCALE
REPORT GIVEN TO VICTORIA BORJAS    Alert-The patient is alert, awake and responds to voice. The patient is oriented to time, place, and person. The triage nurse is able to obtain subjective information.

## 2021-07-31 ENCOUNTER — EMERGENCY (EMERGENCY)
Facility: HOSPITAL | Age: 46
LOS: 1 days | Discharge: HOME | End: 2021-07-31
Attending: EMERGENCY MEDICINE | Admitting: EMERGENCY MEDICINE
Payer: COMMERCIAL

## 2021-07-31 VITALS
DIASTOLIC BLOOD PRESSURE: 120 MMHG | HEIGHT: 58 IN | TEMPERATURE: 98 F | HEART RATE: 90 BPM | RESPIRATION RATE: 18 BRPM | SYSTOLIC BLOOD PRESSURE: 200 MMHG | OXYGEN SATURATION: 100 %

## 2021-07-31 VITALS
HEART RATE: 77 BPM | TEMPERATURE: 98 F | SYSTOLIC BLOOD PRESSURE: 177 MMHG | DIASTOLIC BLOOD PRESSURE: 87 MMHG | RESPIRATION RATE: 17 BRPM | OXYGEN SATURATION: 100 %

## 2021-07-31 LAB
ALBUMIN SERPL ELPH-MCNC: 4.3 G/DL — SIGNIFICANT CHANGE UP (ref 3.3–5)
ALP SERPL-CCNC: 84 U/L — SIGNIFICANT CHANGE UP (ref 40–120)
ALT FLD-CCNC: 22 U/L — SIGNIFICANT CHANGE UP (ref 4–33)
ANION GAP SERPL CALC-SCNC: 15 MMOL/L — HIGH (ref 7–14)
AST SERPL-CCNC: 32 U/L — SIGNIFICANT CHANGE UP (ref 4–32)
BASOPHILS # BLD AUTO: 0.09 K/UL — SIGNIFICANT CHANGE UP (ref 0–0.2)
BASOPHILS NFR BLD AUTO: 1.2 % — SIGNIFICANT CHANGE UP (ref 0–2)
BILIRUB SERPL-MCNC: 0.3 MG/DL — SIGNIFICANT CHANGE UP (ref 0.2–1.2)
BUN SERPL-MCNC: 15 MG/DL — SIGNIFICANT CHANGE UP (ref 7–23)
CALCIUM SERPL-MCNC: 9.2 MG/DL — SIGNIFICANT CHANGE UP (ref 8.4–10.5)
CHLORIDE SERPL-SCNC: 103 MMOL/L — SIGNIFICANT CHANGE UP (ref 98–107)
CO2 SERPL-SCNC: 20 MMOL/L — LOW (ref 22–31)
CREAT SERPL-MCNC: 0.78 MG/DL — SIGNIFICANT CHANGE UP (ref 0.5–1.3)
EOSINOPHIL # BLD AUTO: 0.19 K/UL — SIGNIFICANT CHANGE UP (ref 0–0.5)
EOSINOPHIL NFR BLD AUTO: 2.6 % — SIGNIFICANT CHANGE UP (ref 0–6)
GLUCOSE SERPL-MCNC: 92 MG/DL — SIGNIFICANT CHANGE UP (ref 70–99)
HCT VFR BLD CALC: 41.2 % — SIGNIFICANT CHANGE UP (ref 34.5–45)
HGB BLD-MCNC: 13.5 G/DL — SIGNIFICANT CHANGE UP (ref 11.5–15.5)
IANC: 5.35 K/UL — SIGNIFICANT CHANGE UP (ref 1.5–8.5)
IMM GRANULOCYTES NFR BLD AUTO: 0.4 % — SIGNIFICANT CHANGE UP (ref 0–1.5)
LYMPHOCYTES # BLD AUTO: 1.13 K/UL — SIGNIFICANT CHANGE UP (ref 1–3.3)
LYMPHOCYTES # BLD AUTO: 15.2 % — SIGNIFICANT CHANGE UP (ref 13–44)
MCHC RBC-ENTMCNC: 27.5 PG — SIGNIFICANT CHANGE UP (ref 27–34)
MCHC RBC-ENTMCNC: 32.8 GM/DL — SIGNIFICANT CHANGE UP (ref 32–36)
MCV RBC AUTO: 83.9 FL — SIGNIFICANT CHANGE UP (ref 80–100)
MONOCYTES # BLD AUTO: 0.66 K/UL — SIGNIFICANT CHANGE UP (ref 0–0.9)
MONOCYTES NFR BLD AUTO: 8.9 % — SIGNIFICANT CHANGE UP (ref 2–14)
NEUTROPHILS # BLD AUTO: 5.35 K/UL — SIGNIFICANT CHANGE UP (ref 1.8–7.4)
NEUTROPHILS NFR BLD AUTO: 71.7 % — SIGNIFICANT CHANGE UP (ref 43–77)
NRBC # BLD: 0 /100 WBCS — SIGNIFICANT CHANGE UP
NRBC # FLD: 0 K/UL — SIGNIFICANT CHANGE UP
PLATELET # BLD AUTO: 377 K/UL — SIGNIFICANT CHANGE UP (ref 150–400)
POTASSIUM SERPL-MCNC: 4.4 MMOL/L — SIGNIFICANT CHANGE UP (ref 3.5–5.3)
POTASSIUM SERPL-SCNC: 4.4 MMOL/L — SIGNIFICANT CHANGE UP (ref 3.5–5.3)
PROT SERPL-MCNC: 8 G/DL — SIGNIFICANT CHANGE UP (ref 6–8.3)
RBC # BLD: 4.91 M/UL — SIGNIFICANT CHANGE UP (ref 3.8–5.2)
RBC # FLD: 12.7 % — SIGNIFICANT CHANGE UP (ref 10.3–14.5)
SODIUM SERPL-SCNC: 138 MMOL/L — SIGNIFICANT CHANGE UP (ref 135–145)
TROPONIN T, HIGH SENSITIVITY RESULT: <6 NG/L — SIGNIFICANT CHANGE UP
WBC # BLD: 7.45 K/UL — SIGNIFICANT CHANGE UP (ref 3.8–10.5)
WBC # FLD AUTO: 7.45 K/UL — SIGNIFICANT CHANGE UP (ref 3.8–10.5)

## 2021-07-31 PROCEDURE — 99285 EMERGENCY DEPT VISIT HI MDM: CPT

## 2021-07-31 RX ORDER — SODIUM CHLORIDE 9 MG/ML
1000 INJECTION INTRAMUSCULAR; INTRAVENOUS; SUBCUTANEOUS ONCE
Refills: 0 | Status: COMPLETED | OUTPATIENT
Start: 2021-07-31 | End: 2021-07-31

## 2021-07-31 RX ADMIN — SODIUM CHLORIDE 1000 MILLILITER(S): 9 INJECTION INTRAMUSCULAR; INTRAVENOUS; SUBCUTANEOUS at 14:23

## 2021-07-31 NOTE — ED PROVIDER NOTE - NSFOLLOWUPINSTRUCTIONS_ED_ALL_ED_FT
Dizziness    Dizziness can manifest as a feeling of unsteadiness or light-headedness. You may feel like you are about to faint. This condition can be caused by a number of things, including medicines, dehydration, or illness. Drink enough fluid to keep your urine clear or pale yellow. Do not drink alcohol and limit your caffeine intake. Avoid quick or sudden movements.  Rise slowly from chairs and steady yourself until you feel okay. In the morning, first sit up on the side of the bed.    Follow up with your primary care physician in 1-3 days.    SEEK IMMEDIATE MEDICAL CARE IF YOU HAVE ANY OF THE FOLLOWING SYMPTOMS: vomiting, changes in your vision or speech, weakness in your arms or legs, trouble speaking or swallowing, chest pain, abdominal pain, shortness of breath, sweating, bleeding, headache, neck pain, or fever.

## 2021-07-31 NOTE — ED PROVIDER NOTE - PATIENT PORTAL LINK FT
You can access the FollowMyHealth Patient Portal offered by [facility] by registering at the following website: [email or web address]. By joining [facility]’s FollowMyHealth portal, you will also be able to view your health information using other applications (apps) compatible with our system.

## 2021-07-31 NOTE — ED ADULT NURSE NOTE - OBJECTIVE STATEMENT
pt c/o feeling dizzy on off since returning from vacation/  iv placed and labs to be sent/  iv fluids ns cm  nsr no ectopy pt very anxious

## 2021-07-31 NOTE — ED PROVIDER NOTE - CLINICAL SUMMARY MEDICAL DECISION MAKING FREE TEXT BOX
44 y/o F hx of HTN p/w dizziness x 3 days. pt states that she went to [place] recently and states she may have been dehydrated due to weather. started her menstrual cycle, described as changing 2-3 pads per day. dizziness was persistent and pt states when she came back to [place], she started feeling anxious after looking up her symptoms on the internet.  hypertensive in ed, well appearing otherwise. nad. abd ntnd. lungs cta. no focal neuro deficits. ddx anxiety, dehydration, unmanaged htn. will eval with labs and likely dispo

## 2021-07-31 NOTE — ED PROVIDER NOTE - OBJECTIVE STATEMENT
46 y/o F hx of HTN p/w dizziness x 3 days. pt states that she went to [place] recently and states she may have been dehydrated due to weather. started her menstrual cycle, described as changing 2-3 pads per day. dizziness was persistent and pt states when she came back to [place], she started feeling anxious after looking up her symptoms on the internet.  pt denies any fever, chills, cp, palpitations, cough, sob, abdominal pain, n/v/d, dysuria, numbness/weakness, blurry vision, headache, near syncope

## 2021-07-31 NOTE — ED PROVIDER NOTE - PHYSICAL EXAMINATION
Vital signs reviewed  GENERAL: Patient nontoxic appearing, NAD  HEAD: NCAT  EYES: Anicteric  ENT: MMM  NECK: Supple, non tender  RESPIRATORY: Normal respiratory effort. CTA B/L. No wheezing, rales, rhonchi  CARDIOVASCULAR: Regular rate and rhythm  ABDOMEN: Soft. Nondistended. Nontender. No guarding or rebound. No CVA tenderness.  MUSCULOSKELETAL/EXTREMITIES: Brisk cap refill. 2+ radial pulses. No leg edema.  SKIN:  Warm and dry  NEURO: AAOx3. No gross FND. cn 2-12 intact. sensation intact. MS 5/5 b/l. negative romberg, negative finger to nose.   PSYCHIATRIC: Cooperative. Affect appropriate.

## 2021-07-31 NOTE — ED PROVIDER NOTE - PROGRESS NOTE DETAILS
Jayashree-PGY3 (DAVION): pt seen and re-evaluated at bedside. No dizziness at this time. Pt comfortable in NAD.  Discussed lab results with pt, pt states she will follow up with her PMD this week. Discussed return precautions, pt understood and agreeable with plan.

## 2021-07-31 NOTE — ED ADULT TRIAGE NOTE - CHIEF COMPLAINT QUOTE
Pt c/o feeling dizzy everyday since being on vacation in [place] last week. Pt states she has her menstruation last week--heavy flow  pt very anxious at present--b/p 200/120 just took her losartin

## 2021-07-31 NOTE — ED PROVIDER NOTE - NS ED ROS FT
Constitutional: No fever, chills.  Eyes:  No visual changes  ENMT:  No neck pain  Cardiac:  No chest pain  Respiratory:  No cough, SOB  GI:  No nausea, vomiting, diarrhea, abdominal pain.  :  No dysuria, hematuria  MS:  No back pain.  Neuro:  +dizziness   Skin:  No skin rash  Except as documented in the HPI,  all other systems are negative.

## 2021-07-31 NOTE — ED PROVIDER NOTE - ATTENDING CONTRIBUTION TO CARE
45 year old with elevated bp and dizziness. resolved prior to arrival. will check for electrolyte abn trops cbc reassess

## 2021-08-04 ENCOUNTER — NON-APPOINTMENT (OUTPATIENT)
Age: 46
End: 2021-08-04

## 2021-08-04 NOTE — PLAN
[FreeTextEntry7] :  tasked to f/u in approx 1 month [FreeTextEntry3] : Pt contacted, stressed medication compliance

## 2021-08-04 NOTE — DISCUSSION/SUMMARY
[ED] : a call from ED [FreeTextEntry1] : Dizziness in setting of not taking antihypertensive medications [FreeTextEntry3] : restarted home meds

## 2021-08-10 ENCOUNTER — APPOINTMENT (OUTPATIENT)
Dept: INTERNAL MEDICINE | Facility: CLINIC | Age: 46
End: 2021-08-10
Payer: MEDICAID

## 2021-08-10 ENCOUNTER — OUTPATIENT (OUTPATIENT)
Dept: OUTPATIENT SERVICES | Facility: HOSPITAL | Age: 46
LOS: 1 days | End: 2021-08-10

## 2021-08-10 VITALS
HEART RATE: 85 BPM | RESPIRATION RATE: 17 BRPM | HEIGHT: 58 IN | SYSTOLIC BLOOD PRESSURE: 142 MMHG | DIASTOLIC BLOOD PRESSURE: 69 MMHG | WEIGHT: 195 LBS | OXYGEN SATURATION: 98 % | BODY MASS INDEX: 40.93 KG/M2

## 2021-08-10 VITALS — TEMPERATURE: 98.2 F

## 2021-08-10 DIAGNOSIS — G47.39 OTHER SLEEP APNEA: ICD-10-CM

## 2021-08-10 DIAGNOSIS — E66.01 MORBID (SEVERE) OBESITY DUE TO EXCESS CALORIES: ICD-10-CM

## 2021-08-10 PROCEDURE — 99214 OFFICE O/P EST MOD 30 MIN: CPT | Mod: GC

## 2021-08-11 DIAGNOSIS — E66.01 MORBID (SEVERE) OBESITY DUE TO EXCESS CALORIES: ICD-10-CM

## 2021-08-11 DIAGNOSIS — I10 ESSENTIAL (PRIMARY) HYPERTENSION: ICD-10-CM

## 2021-08-11 DIAGNOSIS — G47.39 OTHER SLEEP APNEA: ICD-10-CM

## 2021-08-11 NOTE — PHYSICAL EXAM
[Well Nourished] : well nourished [Well Developed] : well developed [No JVD] : no jugular venous distention [No Lymphadenopathy] : no lymphadenopathy [Supple] : supple [Normal] : affect was normal and insight and judgment were intact [de-identified] : Anxious appearing

## 2021-08-11 NOTE — COUNSELING
[Potential consequences of obesity discussed] : Potential consequences of obesity discussed [Benefits of weight loss discussed] : Benefits of weight loss discussed [Encouraged to maintain food diary] : Encouraged to maintain food diary [Encouraged to increase physical activity] : Encouraged to increase physical activity [Encouraged to use exercise tracking device] : Encouraged to use exercise tracking device [Decrease Portions] : decrease portions [____ min/wk Activity] : [unfilled] min/wk activity [Patient Non-adherent to care plan] : Patient non-adherent to care plan [Good understanding] : Patient has a good understanding of lifestyle changes and steps needed to achieve self management goal [FreeTextEntry4] : 20

## 2021-08-11 NOTE — ASSESSMENT
[FreeTextEntry1] : 44y/o F with PMHx of HTN and anxiety presenting for a f/u after [facility] ED visit\par \par HTN\par - In ED on 7/31 BP ~200/100 2/2 reported medication nonadherence\par -Today /69\par -Continue Losartan 100mg and Amlodipine 10mg daily\par -counseled patient on the importance of managing BP and the long term consequences of severe HTN. \par -Strongly advised daily BP checks, ongoing lifestyle modification\par -advised patient on taking BP meds together to improve medication adherence\par -RTC 4 weeks to evaluate for improvement\par \par DOC\par - history of snoring and high BP\par - referred for sleep study for further evaluation\par \par Case discussed with Dr. Belcher\par \par Juan Vidales MD\par Internal Medicine PGY1\par [employer]

## 2021-08-11 NOTE — HISTORY OF PRESENT ILLNESS
[FreeTextEntry1] : f/u after ED visit for dizziness [de-identified] : 44y/o F with PMHx of HTN, depression presenting to the clinic after ED visit at [facility] on 7/31 for dizziness. Patient notes she stopped taking her BP meds 3 days prior to her ED visit. Her sx worsen and she started experiencing fatigue and headache prompting her to go to the ED. VS notable for BP ~200/100.  Labs WNL. Sx improved with management of her blood pressures\par \par Since her visit to the ED, patient notes she is feeling better. Her dizziness has improved but still reports slight fatigue and headache. Patients reports taking her losartan 100mg consistently since her ED visit but not Amlodipine 10mg, acknowledges that she has supply at home. Patient takes her losartan in the morning and waits couple of hours before taking amlodipine as she is concerned about a cross-reaction. However, she usually forgets to take amlopidine. She has also not been checking her pressure as her home cuff broke. We discussed the importance of adherence to both medications as her BP remains poorly controlled, and discussed strategies to improve adherence including taking them at the same time and using a pillbox. Of note, this has been discussed with her previously as well. We also discussed the importance of checking BPs daily and logging them. The patient verbalized understanding and reported that she will follow the goals we have discussed. Reported 10/10 confidence in achieving these goals.\par \par Patient also reported that she has been told that she snores at night. Denies any daytime sleepiness, insomnia, or any other symptoms of DOC. She requests a sleep study.
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
## 2021-09-09 ENCOUNTER — EMERGENCY (EMERGENCY)
Facility: HOSPITAL | Age: 46
LOS: 1 days | Discharge: HOME | End: 2021-09-09
Attending: STUDENT IN AN ORGANIZED HEALTH CARE EDUCATION/TRAINING PROGRAM | Admitting: STUDENT IN AN ORGANIZED HEALTH CARE EDUCATION/TRAINING PROGRAM
Payer: MEDICAID

## 2021-09-09 VITALS
TEMPERATURE: 97 F | HEART RATE: 88 BPM | SYSTOLIC BLOOD PRESSURE: 157 MMHG | OXYGEN SATURATION: 100 % | DIASTOLIC BLOOD PRESSURE: 100 MMHG | RESPIRATION RATE: 16 BRPM | HEIGHT: 58 IN

## 2021-09-09 PROCEDURE — 99282 EMERGENCY DEPT VISIT SF MDM: CPT

## 2021-09-09 NOTE — ED PROVIDER NOTE - OBJECTIVE STATEMENT
45F with past medical history of hypertension presents with one episode of left sided calf pain that woke her from sleep, resolved with stretching and walking. Currently has no pain, swelling, or color change in the leg. Has had this same pain occasionally in the past last episode 2 years ago, says doctors have told her she has borderline low potassium before. Takes losartan and amlodipine, no new medication changes or diet changes. Denies chest, abdominal or other muscle pain, denies shortness of breath or palpitations.

## 2021-09-09 NOTE — ED ADULT NURSE NOTE - OBJECTIVE STATEMENT
Patient A&Ox4 ambulatory c/o left calf pain starting this morning. Patient states she woke up from her sleep screaming due to pain. States she had this pain before 2 years ago. PMH HTN. Patient states pain has subsided after starting to walk. Denies hx of blood clots. Respirations even and unlabored. Slight tenderness to left calf. No warmth noted. Pedal pulses present. Denies chest pain, and dyspnea. Awaiting further orders at this time. Stretcher in lowest position, wheels locked, appropriate side rails in place, call bell in reach.

## 2021-09-09 NOTE — ED ADULT TRIAGE NOTE - CHIEF COMPLAINT QUOTE
c/o L calf pain that woke her up from her sleep. Pt states pain improved with walking. Denies chest pain, sob, leg swelling. PMHx HTN.

## 2021-09-09 NOTE — ED PROVIDER NOTE - PHYSICAL EXAMINATION
CONSTITUTIONAL: Alert and Oriented, in no acute distress.   HEENT: No conjunctival injection or scleral icterus.   CARD: Regular rate and rhythm, no murmurs or gallops appreciated.  RESP: Clear to auscultation bilaterally, No wheezes, rales or rhonchi. Good respiratory effort.  ABD: Soft and nontender to palpation in all quadrants, no guarding, no distention, or rigidity. No masses appreciated  EXT: No edema, erythema, or breaks in the skin on the legs. 2+DP pulses bilaterally. Legs symmetric in diameter and color. Negative Leila's sign bilaterally. 5/5 strength hip flexion, knee extension, ankle plantar and dorsiflexion bilaterally.

## 2021-09-09 NOTE — ED PROVIDER NOTE - CLINICAL SUMMARY MEDICAL DECISION MAKING FREE TEXT BOX
45F with Pmhx of hypertension presenting with 1 episode L calf cramp resolved with walking. On exam there is no evidence of DVT or any injury/asymmetry of the limbs, normal exam otherwise. Vitals stable except for elevated blood pressure at 157/100 in triage. No history of clots and had negative b/l LE duplex 3/2021. No imaging or therapeutic interventions needed at this time diagnosis likely nocturnal calf cramp with anxiety causing elevated blood pressure as she reports she is compliant with medications. Re-assured patient and offered to check electrolytes if it will help to reduce anxiety but patient declined.

## 2021-09-09 NOTE — ED PROVIDER NOTE - ATTENDING CONTRIBUTION TO CARE
I have personally performed a face to face medical and diagnostic evaluation of the patient. I have discussed with and reviewed the Resident's note and agree with the History, ROS, Physical Exam and MDM unless otherwise indicated. A brief summary of my personal evaluation and impression can be found below.    45F hx of HTN on meds no OCP no prior hx of dvt/pe presents with a cc of atraumatic LLE pain c/w prior episodes of pain and cramp that awoke pt from sleep overnight episode lasted minutes has since then nearly resolved prior to ED arrival, is ambulatory no meds prior to ED arrival, was told by PMD may have low K, was told to increase K in diet. No new LE swelling. Denies numbness, tingling or loss of sensation. Denies loss of motor function. No rash. No other complaints. Denies n/v/f/c/cp/sob. Denies headache, syncope, lightheadedness, dizziness. Denies chest palpitations, abdominal pain. Denies edema. Denies dysuria, hematuria, BRBPR, tarry stools, diarrhea, constipation.       All other ROS negative, except as above and as per HPI and ROS section.    VITALS: Initial triage and subsequent vitals have been reviewed by me.  GEN APPEARANCE: WDWN, alert, non-toxic, NAD  HEAD: Atraumatic.  EYES: PERRLa, EOMI, vision grossly intact.   NECK: Supple  CV: RRR, S1S2, no c/r/m/g. Cap refill <2 seconds. No bruits.   LUNGS: CTAB. No abnormal breath sounds.  ABDOMEN: Soft, NTND. No guarding or rebound.   MSK/EXT: No spinal or extremity point tenderness. No CVA ttp. Pelvis stable. No peripheral edema. NO rash to b/l LE no edema, +2 DP b/l   NEURO: Alert, follows commands. Weight bearing normal. Speech normal. Sensation and motor normal x4 extremities.   SKIN: Warm, dry and intact. No rash.  PSYCH: Appropriate    Plan/MDM: 45F hx of HTN on meds no OCP no prior hx of dvt/pe presents with a cc of atraumatic LLE pain c/w prior episodes of pain and cramp that awoke pt from sleep overnight episode lasted minutes has since then nearly resolved prior to ED arrival, is ambulatory no meds prior to ED arrival, was told by PMD may have low K, was told to increase K in diet. exam vss non toxic PE as above very low c/f PE as not swollen no risk factors, no infxn as per exam, low c/f fx as no mech, offered e lyte check pt declined as she feels reassured, stable for dc w strict return precautions. most likely cramp.

## 2021-09-10 ENCOUNTER — NON-APPOINTMENT (OUTPATIENT)
Age: 46
End: 2021-09-10

## 2021-09-14 ENCOUNTER — APPOINTMENT (OUTPATIENT)
Dept: INTERNAL MEDICINE | Facility: CLINIC | Age: 46
End: 2021-09-14

## 2021-09-18 ENCOUNTER — APPOINTMENT (OUTPATIENT)
Dept: ULTRASOUND IMAGING | Facility: IMAGING CENTER | Age: 46
End: 2021-09-18

## 2021-10-01 ENCOUNTER — EMERGENCY (EMERGENCY)
Facility: HOSPITAL | Age: 46
LOS: 1 days | Discharge: HOME | End: 2021-10-01
Attending: EMERGENCY MEDICINE | Admitting: EMERGENCY MEDICINE
Payer: MEDICAID

## 2021-10-01 VITALS
HEART RATE: 87 BPM | DIASTOLIC BLOOD PRESSURE: 100 MMHG | SYSTOLIC BLOOD PRESSURE: 171 MMHG | HEIGHT: 58 IN | RESPIRATION RATE: 16 BRPM | OXYGEN SATURATION: 100 % | TEMPERATURE: 97 F

## 2021-10-01 PROCEDURE — 93010 ELECTROCARDIOGRAM REPORT: CPT

## 2021-10-01 PROCEDURE — 99284 EMERGENCY DEPT VISIT MOD MDM: CPT | Mod: 25

## 2021-10-01 NOTE — ED ADULT TRIAGE NOTE - CHIEF COMPLAINT QUOTE
dizziness at 2100/2200, improved after taking off her N95 while walking. States feel better now, states "I feel like lightheaded." No neuro deficits on exam. as per MD Mora NO STROKE CODE. Hx HTN

## 2021-10-02 VITALS
HEART RATE: 87 BPM | RESPIRATION RATE: 18 BRPM | SYSTOLIC BLOOD PRESSURE: 151 MMHG | OXYGEN SATURATION: 100 % | DIASTOLIC BLOOD PRESSURE: 102 MMHG | TEMPERATURE: 97 F

## 2021-10-02 NOTE — ED PROVIDER NOTE - PATIENT PORTAL LINK FT
You can access the FollowMyHealth Patient Portal offered by [facility] by registering at the following website: [email or web address]. By joining [employer]’s FollowMyHealth portal, you will also be able to view your health information using other applications (apps) compatible with our system.

## 2021-10-02 NOTE — ED PROVIDER NOTE - PROGRESS NOTE DETAILS
DD ED ATTG:   called to eval for stroke.  Pt with dizziness and R hand tingling off and on today.  Pt does have carpal tunnel R side.  No weakness of arms or legs.  face symmetric, speech fluent.  gait normal.  No stroke code.

## 2021-10-02 NOTE — ED PROVIDER NOTE - NS ED ROS FT
GENERAL: No fever or chills  EYES: No change in vision  HEENT: No trouble swallowing or speaking  CARDIAC: No chest pain  PULMONARY: No cough or SOB  GI: No abdominal pain, no nausea or no vomiting, no diarrhea or constipation  : No changes in urination  SKIN: No rashes  NEURO: No headache, no numbness, + dizziness  MSK: No joint pain  Otherwise as HPI or negative.

## 2021-10-02 NOTE — ED PROVIDER NOTE - PHYSICAL EXAMINATION
Physical Exam:  General: NAD, Conversive, anxious  Eyes: EOMI, Conjunctiva and sclera clear  Neck: No JVD  Lungs: Clear to auscultation bilaterally, no wheeze, no rhonchi  Heart: Normal S1, S2, no murmurs  Abdomen: Soft, nontender, nondistended, no CVA tenderness  Extremities: 2+ peripheral pulses, no edema  Psych: AAO X3  Neurologic: Non-focal, X4 extremities sensation and motor intact, no disdiadokinesia, dysmetria

## 2021-10-02 NOTE — ED PROVIDER NOTE - OBJECTIVE STATEMENT
45 Y F H/O HTN, HCL, presenting with intermittent episodes of dizziness. States [place] city and putting on duckbill mask experienced brief period of dizziness, improved with removal of mask, no relation to exertion. In setting of first re-exposure to eating out in city for the past year. Primarily stressed that she experienced a stroke, heart attack, covid, arthritis. Denies any headache, vision change, nausea, vomiting, chest pain, back pain, syncope, arm weakness, sensation change, gait instabiity, exertional dyspnea.

## 2021-10-02 NOTE — ED ADULT NURSE NOTE - OBJECTIVE STATEMENT
Pt received in bed 28. C/o dizziness earlier today.  was out with family in [place] and while taking the stairs felt dizzy.  was also wearing an N95 and when she removed it instantly felt better. PMH of HTN. A&Ox3, ambulatory. Breathing even and unlabored. NSR on the cardiac monitor. No complaints of chest pain, headache, nausea, dizziness, vomiting  SOB, fever, or chills at this time. MD at bedside for eval. Will continue to monitor.

## 2021-10-02 NOTE — ED PROVIDER NOTE - CLINICAL SUMMARY MEDICAL DECISION MAKING FREE TEXT BOX
45 Y F presenting with intermittent periods of dizziness. Most likely disgnosis anxiety induced dizziness, no central neurologic symptoms or prolonged neurologic deficits concerning for stroke, no chest pain or EKG changes concerning for primary ACS, EKG demosntrates nos ign of brugada, HOCM, QRS/QT,OK interval prolongation. DC with primary followup with PMD

## 2021-10-02 NOTE — ED PROVIDER NOTE - NSFOLLOWUPINSTRUCTIONS_ED_ALL_ED_FT
Dizziness    Dizziness can manifest as a feeling of unsteadiness or light-headedness. You may feel like you are about to faint. This condition can be caused by a number of things, including medicines, dehydration, or illness. Drink enough fluid to keep your urine clear or pale yellow. Do not drink alcohol and limit your caffeine intake. Avoid quick or sudden movements.  Rise slowly from chairs and steady yourself until you feel okay. In the morning, first sit up on the side of the bed.    Dizziness can also be caused by a number of conditions including anxiety. Generalized anxiety disorder (MARIA DEL CARMEN) is a mental disorder. It is defined as anxiety that is not necessarily related to specific events or activities or is out of proportion to said events. Symptoms include restlessness, fatigue, difficulty concentrations, irritability and difficulty concentrating. It may interfere with life functions, including relationships, work, and school. If you were started on a medication, make sure to take exactly as prescribed and follow up with a psychiatrist.    Please follow up with your primary care doctor within the next 3-4 days for re-assessment of your blood pressure and to re-evaluate if additional blood pressure medications are warranted.    SEEK IMMEDIATE MEDICAL CARE IF YOU HAVE ANY OF THE FOLLOWING SYMPTOMS: vomiting, changes in your vision or speech, weakness in your arms or legs, trouble speaking or swallowing, chest pain, abdominal pain, shortness of breath, sweating, bleeding, headache, neck pain, or fever.

## 2021-10-04 ENCOUNTER — NON-APPOINTMENT (OUTPATIENT)
Age: 46
End: 2021-10-04

## 2021-11-23 ENCOUNTER — APPOINTMENT (OUTPATIENT)
Dept: INTERNAL MEDICINE | Facility: CLINIC | Age: 46
End: 2021-11-23

## 2021-12-21 ENCOUNTER — EMERGENCY (EMERGENCY)
Facility: HOSPITAL | Age: 46
LOS: 1 days | Discharge: HOME | End: 2021-12-21
Attending: EMERGENCY MEDICINE | Admitting: EMERGENCY MEDICINE
Payer: COMMERCIAL

## 2021-12-21 VITALS
OXYGEN SATURATION: 98 % | TEMPERATURE: 98 F | DIASTOLIC BLOOD PRESSURE: 109 MMHG | SYSTOLIC BLOOD PRESSURE: 153 MMHG | HEART RATE: 76 BPM | RESPIRATION RATE: 18 BRPM

## 2021-12-21 VITALS
HEIGHT: 58 IN | SYSTOLIC BLOOD PRESSURE: 220 MMHG | RESPIRATION RATE: 18 BRPM | DIASTOLIC BLOOD PRESSURE: 118 MMHG | TEMPERATURE: 98 F | OXYGEN SATURATION: 100 % | HEART RATE: 99 BPM

## 2021-12-21 LAB
ALBUMIN SERPL ELPH-MCNC: 4.4 G/DL — SIGNIFICANT CHANGE UP (ref 3.3–5)
ALP SERPL-CCNC: 89 U/L — SIGNIFICANT CHANGE UP (ref 40–120)
ALT FLD-CCNC: 17 U/L — SIGNIFICANT CHANGE UP (ref 4–33)
ANION GAP SERPL CALC-SCNC: 10 MMOL/L — SIGNIFICANT CHANGE UP (ref 7–14)
AST SERPL-CCNC: 26 U/L — SIGNIFICANT CHANGE UP (ref 4–32)
BILIRUB SERPL-MCNC: 0.3 MG/DL — SIGNIFICANT CHANGE UP (ref 0.2–1.2)
BUN SERPL-MCNC: 15 MG/DL — SIGNIFICANT CHANGE UP (ref 7–23)
CALCIUM SERPL-MCNC: 8.9 MG/DL — SIGNIFICANT CHANGE UP (ref 8.4–10.5)
CHLORIDE SERPL-SCNC: 101 MMOL/L — SIGNIFICANT CHANGE UP (ref 98–107)
CO2 SERPL-SCNC: 23 MMOL/L — SIGNIFICANT CHANGE UP (ref 22–31)
CREAT SERPL-MCNC: 0.79 MG/DL — SIGNIFICANT CHANGE UP (ref 0.5–1.3)
GLUCOSE SERPL-MCNC: 83 MG/DL — SIGNIFICANT CHANGE UP (ref 70–99)
HCT VFR BLD CALC: 39.1 % — SIGNIFICANT CHANGE UP (ref 34.5–45)
HGB BLD-MCNC: 13.3 G/DL — SIGNIFICANT CHANGE UP (ref 11.5–15.5)
MCHC RBC-ENTMCNC: 27.9 PG — SIGNIFICANT CHANGE UP (ref 27–34)
MCHC RBC-ENTMCNC: 34 GM/DL — SIGNIFICANT CHANGE UP (ref 32–36)
MCV RBC AUTO: 82.1 FL — SIGNIFICANT CHANGE UP (ref 80–100)
NRBC # BLD: 0 /100 WBCS — SIGNIFICANT CHANGE UP
NRBC # FLD: 0 K/UL — SIGNIFICANT CHANGE UP
PLATELET # BLD AUTO: 371 K/UL — SIGNIFICANT CHANGE UP (ref 150–400)
POTASSIUM SERPL-MCNC: 4 MMOL/L — SIGNIFICANT CHANGE UP (ref 3.5–5.3)
POTASSIUM SERPL-SCNC: 4 MMOL/L — SIGNIFICANT CHANGE UP (ref 3.5–5.3)
PROT SERPL-MCNC: 7.3 G/DL — SIGNIFICANT CHANGE UP (ref 6–8.3)
RBC # BLD: 4.76 M/UL — SIGNIFICANT CHANGE UP (ref 3.8–5.2)
RBC # FLD: 12.7 % — SIGNIFICANT CHANGE UP (ref 10.3–14.5)
SODIUM SERPL-SCNC: 134 MMOL/L — LOW (ref 135–145)
WBC # BLD: 7.64 K/UL — SIGNIFICANT CHANGE UP (ref 3.8–10.5)
WBC # FLD AUTO: 7.64 K/UL — SIGNIFICANT CHANGE UP (ref 3.8–10.5)

## 2021-12-21 PROCEDURE — 99284 EMERGENCY DEPT VISIT MOD MDM: CPT

## 2021-12-21 PROCEDURE — 70450 CT HEAD/BRAIN W/O DYE: CPT | Mod: 26,MA

## 2021-12-21 RX ADMIN — Medication 0.1 MILLIGRAM(S): at 22:13

## 2021-12-21 NOTE — ED PROVIDER NOTE - PATIENT PORTAL LINK FT
You can access the FollowMyHealth Patient Portal offered by [facility] by registering at the following website: [email or web address]. By joining [employer]’s FollowMyHealth portal, you will also be able to view your health information using other applications (apps) compatible with our system.

## 2021-12-21 NOTE — ED ADULT TRIAGE NOTE - NS ED TRIAGE AVPU SCALE
Left message for patient to return our call at the [facility] [phone].   Alert-The patient is alert, awake and responds to voice. The patient is oriented to time, place, and person. The triage nurse is able to obtain subjective information.

## 2021-12-21 NOTE — ED PROVIDER NOTE - ATTENDING CONTRIBUTION TO CARE
46 year old female with a history of HTN and headaches came to the ED because of HTN and headache, she took her meds and than came to the ED. Symptoms improved when she got here. On exam: vitals noted, rrr, lungs cta, abd soft, nt, no meningeal signs, PERRL, normal finger to nose, no pronator drift, 5/5 strength in all 4 ext, normal gait. Labs, ekg, ct, head ct, BP control.

## 2021-12-21 NOTE — ED PROVIDER NOTE - OBJECTIVE STATEMENT
46 F with PMHx HTN presents with HA since Sunday after arguing with friend. Often gets HAs when emotional but often self-resolves after 1-2 days. States this HA was increased intensity 10/10 located across forehead with radiation to top and back of head. Did not self resolve. Did not check BP because anxious of what number would be. Before presenting to ED today, she took her home Losartan 100 mg and 2 tabs Tylenol 650 mg. Now, HA has resolved. No blurry vision, CP, palpitaitons, SOB, numbness/paresthesias/loss of function of limbs. 46 F with PMHx HTN presents with HA since Sunday after arguing with friend. Often gets HAs when emotional but often self-resolves after 1-2 days. States this HA was increased intensity 10/10 located across forehead with radiation to top and back of head. Did not self resolve. Did not check BP because anxious of what number would be. Before presenting to ED today, she took her home Losartan 100 mg and 2 tabs Tylenol 650 mg. Now, HA has resolved. No blurry vision, CP, palpitaitons, SOB, numbness/paresthesias/loss of function of limbs. No travel hx, no sick contacts, no recent illness. No new meds. Misses Losartan doses, doesn't check BP often, last checked in summer 130s/90s.

## 2021-12-21 NOTE — ED PROVIDER NOTE - NSFOLLOWUPINSTRUCTIONS_ED_ALL_ED_FT
You presented to the hospital with a severe headache. You were found to have an elevated blood pressure. Your headache resolved after receiving Tylenol and your prescribed blood pressure medication. We performed lab work and ECG that were largely normal. Your CAT scan of the head showed no evidence of stroke or acute bleed. Please take your blood pressure medication as prescribed. Please follow up with your primary care provider for management of your blood pressure. Return to the emergency room if you have severe headache, chest pain, difficulty breathing.

## 2021-12-21 NOTE — ED PROVIDER NOTE - CLINICAL SUMMARY MEDICAL DECISION MAKING FREE TEXT BOX
46 F with PMHx of HTN presents with elevated BP and HA. States HA resolved after Losartan and Tylenol. CT head, CMP, CBC, HCG, EKG. BP checks. Will give clonidine 0.1 mg PO.

## 2021-12-21 NOTE — ED PROVIDER NOTE - PROGRESS NOTE DETAILS
Yulisa Davison, PGY-1: Headache resolved. BP improved. Head CT without evidence of intracranial bleed or acute pathology. Labs within normal limits. Results discussed with patient, endorsed understanding. To be discharged with followup with PCP for BP management.

## 2021-12-21 NOTE — ED PROVIDER NOTE - NSFOLLOWUPCLINICS_GEN_ALL_ED_FT
[facility] General Internal Medicine  General Internal Medicine  [address] [id number]  Phone: [phone]  Fax:   Follow Up Time: Routine

## 2021-12-21 NOTE — ED PROVIDER NOTE - PHYSICAL EXAMINATION
PHYSICAL EXAM:  GENERAL: Anxious affect. well-groomed, well-developed female  HEAD: Atraumatic, Normocephalic  EYES: EOMI, PERRL, conjunctiva and sclera clear  ENMT: Moist mucous membranes  NECK: Supple, No JVD, Normal Thyroid  CHEST/LUNG: Clear to auscultation bilaterally; No rales, rhonchi, wheezing, or rubs  HEART: Regular rate and rhythm; S1 and S2; No murmurs, rubs, or gallops  ABDOMEN: Soft, Nontender, Nondistended: Bowel sounds present  EXTREMITIES: 2+ Peripheral Pulses, No clubbing, cyanosis, or edema  LYMPH: No lymphadenopathy noted  SKIN: No rashes or lesions  MSK: no joint swelling or erythema  NEURO: CN 2-12 intact, 5/5 strength in UE and LE, gross sensation intact Normal gait

## 2021-12-21 NOTE — ED ADULT TRIAGE NOTE - CHIEF COMPLAINT QUOTE
Pt c/o having worsening headache since Sunday that has not gone away. Pt states she was involved with a stressful situation and started to have palpitations. Denies chest pain. c/o having high blood pressure. Hx of HTN

## 2021-12-21 NOTE — ED ADULT TRIAGE NOTE - MODE OF ARRIVAL
Recent TSH 6.69. New diagnosia. Could explain partially why BS difficult to control.  
[FreeTextEntry1] : MSK: Bilateral neck pain\par -Most likely muscle strain from head positioning at sleeping time\par -C-spine Xray ordered.\par -Ibuprofen 600 mg tabs sent to pharmacy.\par -L-spine Xray ordered.\par -Pt may benefit from Chiropractor/ PT, will await Xray results.\par \par GERD/Epigastric pain: h/o +H. Pylori\par -2 week trial of Omeprazole\par -Dietary changes discussed.\par -May need GI eval if fails Omeprazole trial.\par \par Obesity/ Pre-DM:\par -HgA1c 5/7 1/2018\par -Weight loss encouraged\par -Encouraged to make appointment for CPE starting 209. \par \par [facility]: HLD\par -lipids from 1/2018 showed total cholesterol 213\par -Advised low fat/low cholesterol diet and weight loss\par \par HCM:\par CPE 12/12/2017\par EKG 12/12/2017\par Depression screening: negative 12/12/2017 PHQ 2 score 0\par Pt refuses Flu vaccine.\par Tdap: 12//12/2017\par HIV testing offered: he declined testing 12/12/2017\par F/U 3 months
Walk in

## 2021-12-21 NOTE — ED ADULT NURSE NOTE - OBJECTIVE STATEMENT
Janel RN: 46 year old female received to 15A, AAOx4 ambulatory. Pt c/o persistent headache x3 days partially relieved by Tylenol at home. Pt endorses palpitations after having argument with friend stating "with the headache and high blood pressure I thought I might be having a heart attack or stroke, high blood pressure runs in my family". Pt to be hypertensive, MD at bedside made aware. Pt took her BP medication before coming to ED. Pt states headache has resolved when she arrived to ED. Pt denies chest pain/palpitations at this time. Pt denies dizziness, shortness of breath, n/v/d, fever, chills. Respirations even and unlabored. Skin intact. VS as noted. Bed in lowest position, safety maintained. Report given to primary RN.

## 2021-12-22 ENCOUNTER — NON-APPOINTMENT (OUTPATIENT)
Age: 46
End: 2021-12-22

## 2021-12-22 NOTE — DISCUSSION/SUMMARY
[FreeTextEntry1] : ED Note 12/21:\par \par 6 F with PMHx HTN presents with HA since Sunday after arguing with friend. Often gets HAs when emotional but often self-resolves after 1-2 days. States this HA was increased intensity 10/10 located across forehead with radiation to top and back of head. Did not self resolve. Did not check BP because anxious of what number would be. Before presenting to ED today, she took her home Losartan 100 mg and 2 tabs Tylenol 650 mg. Now, HA has resolved. No blurry vision, CP, palpitations, SOB, numbness/paresthesias/loss of function of limbs. No travel hx, no sick contacts, no recent illness. No new meds. Misses Losartan doses, doesn't check BP often, last checked in summer 130s/90s.\par \par Labs, CT Head neg. /109. Given 0.1 clonidine. Repeat 153/109.\par \par Called pt, she stated her BP was high yesterday and that caused her HA. Pt took her BP meds and HA has since resolved. Pt will get a new BP cuff and take more readings. Encouraged her to write them down and take her BPs qd. Will task FD to schedule f/u appt re: BP mgmt.

## 2022-01-29 ENCOUNTER — EMERGENCY (EMERGENCY)
Facility: HOSPITAL | Age: 47
LOS: 1 days | Discharge: HOME | End: 2022-01-29
Attending: EMERGENCY MEDICINE | Admitting: EMERGENCY MEDICINE
Payer: COMMERCIAL

## 2022-01-29 VITALS
HEART RATE: 89 BPM | RESPIRATION RATE: 18 BRPM | OXYGEN SATURATION: 100 % | HEIGHT: 58 IN | DIASTOLIC BLOOD PRESSURE: 115 MMHG | TEMPERATURE: 99 F | SYSTOLIC BLOOD PRESSURE: 189 MMHG

## 2022-01-29 VITALS
DIASTOLIC BLOOD PRESSURE: 124 MMHG | OXYGEN SATURATION: 100 % | RESPIRATION RATE: 16 BRPM | HEART RATE: 85 BPM | SYSTOLIC BLOOD PRESSURE: 186 MMHG

## 2022-01-29 PROCEDURE — 99283 EMERGENCY DEPT VISIT LOW MDM: CPT

## 2022-01-29 RX ADMIN — Medication 1 APPLICATION(S): at 17:33

## 2022-01-29 NOTE — ED PROVIDER NOTE - NSFOLLOWUPINSTRUCTIONS_ED_ALL_ED_FT
1) Follow up with your PCP and dermatologist within 1 week of being seen in the Emergency Department  2) Return to the ED immediately for new or worsening symptoms chest pain, difficulty breathing, headache, vomiting, fever, not improving   3) Please continue to take any home medications as prescribed  4) Your test results from your ED visit were discussed with you prior to discharge  5) You were provided with a copy of your test results  6) Please apply the cream 2 times a day for 14 days. Apply cream after showering and drying area completely. Use a different towel to dry the area than you use for other parts of your body. Keep this area clean and dry to help treat the infection.  7) Your blood pressure was high in the Emergency Department. Please take your home medications as directed. Follow up with your PCP as soon as possible for this.

## 2022-01-29 NOTE — ED PROVIDER NOTE - PROGRESS NOTE DETAILS
Kole Tipton, PGY-2- Patient received Clotrimazole. Instructed to use twice a day for 14 days. BP elevated in ED. Pt anxious, but otherwise well-appearing. Instructed to follow up closely with her dermatology and PCP. Needs BP control, mammogram is almost due. Discussed results of work up with patient. Patient agrees with plan to discharge home. All questions answered regarding plan. Strict return precautions given.

## 2022-01-29 NOTE — ED ADULT NURSE NOTE - OBJECTIVE STATEMENT
Pt received to room 23, pt ambulatory at baseline. Pt c/o rash underneath b/l breasts for 2 days. Pt states rash is burning and itchy. Rash noted to be red in color. Pt appears to be anxious. RR even and unlabored, pt denies CP, SOB, chills, fever, n/v/d. MD at bedside. Pt has pmhx of psoriasis, hypertension. States that she took her BP medication today but missed her dose yesterday.

## 2022-01-29 NOTE — ED ADULT TRIAGE NOTE - CHIEF COMPLAINT QUOTE
Pt brought in by EMS, c/o rash underneath breast x2 days. Pt c/o feeling anxious, hypertensive in triage. Denies dizziness/cp. Pt states she is compliant with her Losartan medication.

## 2022-01-29 NOTE — ED PROVIDER NOTE - PHYSICAL EXAMINATION
General: well appearing, no acute distress, AOx3  Skin: circular red rash with clear margins beneath bilateral breasts, moist, consistent with fungal infection, no nipple inversion or involvement, no pallor  Head: normocephalic, atraumatic  Eyes: clear conjunctiva, EOMI  ENMT: airway patent, no nasal discharge  Cardiovascular: normal rate, normal rhythm, S1/S2  Pulmonary: clear to auscultation bilaterally, no rales, rhonchi, or wheeze  Abdomen: soft, nontender  Musculoskeletal: moving extremities well, no deformity  Psych: normal mood, appears anxious

## 2022-01-29 NOTE — ED PROVIDER NOTE - PATIENT PORTAL LINK FT
You can access the FollowMyHealth Patient Portal offered by [facility] by registering at the following website: [email or web address]. By joining [employer]’s FollowMyHealth portal, you will also be able to view your health information using other applications (apps) compatible with our system.

## 2022-01-29 NOTE — ED PROVIDER NOTE - ATTENDING CONTRIBUTION TO CARE
agree with resident note    "46 year old female with a pmhx of HTN, anxiety, psoriasis, is brought to ED by EMS for evaluation of rash that she noticed underneath both breasts yesterday. Pt reports red, burning sensation below breasts. Feels anxious and is worried she may have breast cancer. Pt reports taking her BP meds this morning, but still having elevated BP. No recent fever, chills, cp, sob, cough, abd pain, vomiting, diarrhea. Had normal mammogram less than 2 years ago. Has PCP."    PE: hypertensive, well appearing; CTAB/L; red excoriated rash in interfolds; scaly; pruritic; s1 s2 no m/r/g abd soft/NT/ND ext: no edema    Imp: fungal rash under breasts; clotrimazole cream; is hypertensive; urged to record BPs and follow up with PCP

## 2022-01-29 NOTE — ED PROVIDER NOTE - CLINICAL SUMMARY MEDICAL DECISION MAKING FREE TEXT BOX
Kole Tipton, PGY-2- 46 year old female with a pmhx of anxiety, psoriasis, HTN, here for rash beneath b/l breasts. Rash consistent with fungal infection. Pt to be treated with Clotrimazole. BP elevated in ED, pt took home meds, very anxious and likely elevating BP. Advised to closely f/u with her PCP.

## 2022-01-29 NOTE — ED PROVIDER NOTE - NS ED ROS FT
General: no fever, no chills  Eyes: no vision changes, no eye pain  Cardiovascular: no chest pain, no edema  Respiratory: no cough, no shortness of breath  Gastrointestinal: no abdominal pain, no nausea, no vomiting, no diarrhea  Genitourinary: no dysuria, no hematuria  Musculoskeletal: no muscle pain, no joint pain  Skin: +rash, no lesions  Neuro: no numbness, no tingling  Psych: no depression, no anxiety

## 2022-01-29 NOTE — ED PROVIDER NOTE - OBJECTIVE STATEMENT
46 year old female with a pmhx of HTN, anxiety, psoriasis, is brought to ED by EMS for evaluation of rash that she noticed underneath both breasts yesterday. Pt reports red, burning sensation below breasts. Feels anxious and is worried she may have breast cancer. Pt reports taking her BP meds this morning, but still having elevated BP. No recent fever, chills, cp, sob, cough, abd pain, vomiting, diarrhea. Had normal mammogram less than 2 years ago. Has PCP.

## 2022-02-24 RX ORDER — LOSARTAN POTASSIUM 100 MG/1
100 TABLET, FILM COATED ORAL DAILY
Qty: 90 | Refills: 1 | Status: ACTIVE | COMMUNITY
Start: 2020-02-11 | End: 1900-01-01

## 2022-02-26 ENCOUNTER — EMERGENCY (EMERGENCY)
Facility: HOSPITAL | Age: 47
LOS: 1 days | Discharge: HOME | End: 2022-02-26
Attending: EMERGENCY MEDICINE | Admitting: EMERGENCY MEDICINE
Payer: COMMERCIAL

## 2022-02-26 VITALS
SYSTOLIC BLOOD PRESSURE: 197 MMHG | TEMPERATURE: 97 F | DIASTOLIC BLOOD PRESSURE: 135 MMHG | HEART RATE: 109 BPM | OXYGEN SATURATION: 100 % | RESPIRATION RATE: 20 BRPM | HEIGHT: 58 IN

## 2022-02-26 LAB
ALBUMIN SERPL ELPH-MCNC: 4.1 G/DL — SIGNIFICANT CHANGE UP (ref 3.3–5)
ALP SERPL-CCNC: 83 U/L — SIGNIFICANT CHANGE UP (ref 40–120)
ALT FLD-CCNC: 15 U/L — SIGNIFICANT CHANGE UP (ref 4–33)
ANION GAP SERPL CALC-SCNC: 10 MMOL/L — SIGNIFICANT CHANGE UP (ref 7–14)
AST SERPL-CCNC: 15 U/L — SIGNIFICANT CHANGE UP (ref 4–32)
BASOPHILS # BLD AUTO: 0.06 K/UL — SIGNIFICANT CHANGE UP (ref 0–0.2)
BASOPHILS NFR BLD AUTO: 0.8 % — SIGNIFICANT CHANGE UP (ref 0–2)
BILIRUB SERPL-MCNC: 0.3 MG/DL — SIGNIFICANT CHANGE UP (ref 0.2–1.2)
BUN SERPL-MCNC: 14 MG/DL — SIGNIFICANT CHANGE UP (ref 7–23)
CALCIUM SERPL-MCNC: 9.4 MG/DL — SIGNIFICANT CHANGE UP (ref 8.4–10.5)
CHLORIDE SERPL-SCNC: 99 MMOL/L — SIGNIFICANT CHANGE UP (ref 98–107)
CO2 SERPL-SCNC: 27 MMOL/L — SIGNIFICANT CHANGE UP (ref 22–31)
CREAT SERPL-MCNC: 0.8 MG/DL — SIGNIFICANT CHANGE UP (ref 0.5–1.3)
EOSINOPHIL # BLD AUTO: 0.19 K/UL — SIGNIFICANT CHANGE UP (ref 0–0.5)
EOSINOPHIL NFR BLD AUTO: 2.4 % — SIGNIFICANT CHANGE UP (ref 0–6)
GLUCOSE SERPL-MCNC: 99 MG/DL — SIGNIFICANT CHANGE UP (ref 70–99)
HCT VFR BLD CALC: 41.7 % — SIGNIFICANT CHANGE UP (ref 34.5–45)
HGB BLD-MCNC: 13.6 G/DL — SIGNIFICANT CHANGE UP (ref 11.5–15.5)
IANC: 5.6 K/UL — SIGNIFICANT CHANGE UP (ref 1.5–8.5)
IMM GRANULOCYTES NFR BLD AUTO: 0.4 % — SIGNIFICANT CHANGE UP (ref 0–1.5)
LYMPHOCYTES # BLD AUTO: 1.11 K/UL — SIGNIFICANT CHANGE UP (ref 1–3.3)
LYMPHOCYTES # BLD AUTO: 14.2 % — SIGNIFICANT CHANGE UP (ref 13–44)
MCHC RBC-ENTMCNC: 27 PG — SIGNIFICANT CHANGE UP (ref 27–34)
MCHC RBC-ENTMCNC: 32.6 GM/DL — SIGNIFICANT CHANGE UP (ref 32–36)
MCV RBC AUTO: 82.9 FL — SIGNIFICANT CHANGE UP (ref 80–100)
MONOCYTES # BLD AUTO: 0.81 K/UL — SIGNIFICANT CHANGE UP (ref 0–0.9)
MONOCYTES NFR BLD AUTO: 10.4 % — SIGNIFICANT CHANGE UP (ref 2–14)
NEUTROPHILS # BLD AUTO: 5.6 K/UL — SIGNIFICANT CHANGE UP (ref 1.8–7.4)
NEUTROPHILS NFR BLD AUTO: 71.8 % — SIGNIFICANT CHANGE UP (ref 43–77)
NRBC # BLD: 0 /100 WBCS — SIGNIFICANT CHANGE UP
NRBC # FLD: 0 K/UL — SIGNIFICANT CHANGE UP
PLATELET # BLD AUTO: 338 K/UL — SIGNIFICANT CHANGE UP (ref 150–400)
POTASSIUM SERPL-MCNC: 3.4 MMOL/L — LOW (ref 3.5–5.3)
POTASSIUM SERPL-SCNC: 3.4 MMOL/L — LOW (ref 3.5–5.3)
PROT SERPL-MCNC: 7.4 G/DL — SIGNIFICANT CHANGE UP (ref 6–8.3)
RBC # BLD: 5.03 M/UL — SIGNIFICANT CHANGE UP (ref 3.8–5.2)
RBC # FLD: 13.6 % — SIGNIFICANT CHANGE UP (ref 10.3–14.5)
SODIUM SERPL-SCNC: 136 MMOL/L — SIGNIFICANT CHANGE UP (ref 135–145)
WBC # BLD: 7.8 K/UL — SIGNIFICANT CHANGE UP (ref 3.8–10.5)
WBC # FLD AUTO: 7.8 K/UL — SIGNIFICANT CHANGE UP (ref 3.8–10.5)

## 2022-02-26 PROCEDURE — 99285 EMERGENCY DEPT VISIT HI MDM: CPT | Mod: 25

## 2022-02-26 PROCEDURE — 93010 ELECTROCARDIOGRAM REPORT: CPT

## 2022-02-26 RX ORDER — METOCLOPRAMIDE HCL 10 MG
10 TABLET ORAL ONCE
Refills: 0 | Status: COMPLETED | OUTPATIENT
Start: 2022-02-26 | End: 2022-02-26

## 2022-02-26 RX ORDER — AMLODIPINE BESYLATE 2.5 MG/1
5 TABLET ORAL ONCE
Refills: 0 | Status: COMPLETED | OUTPATIENT
Start: 2022-02-26 | End: 2022-02-26

## 2022-02-26 RX ORDER — SODIUM CHLORIDE 9 MG/ML
1000 INJECTION INTRAMUSCULAR; INTRAVENOUS; SUBCUTANEOUS ONCE
Refills: 0 | Status: COMPLETED | OUTPATIENT
Start: 2022-02-26 | End: 2022-02-26

## 2022-02-26 RX ADMIN — AMLODIPINE BESYLATE 5 MILLIGRAM(S): 2.5 TABLET ORAL at 22:55

## 2022-02-26 RX ADMIN — SODIUM CHLORIDE 1000 MILLILITER(S): 9 INJECTION INTRAMUSCULAR; INTRAVENOUS; SUBCUTANEOUS at 23:55

## 2022-02-26 RX ADMIN — Medication 10 MILLIGRAM(S): at 22:54

## 2022-02-26 NOTE — ED ADULT TRIAGE NOTE - CHIEF COMPLAINT QUOTE
headache and high bp    pt c/o frontal headache for 2 days with nausea but no vomiting.  no visual issues, nausea.. occasional lightheadedness and palpitations .  has hx of htn.. takes losartan.  pt anxious.

## 2022-02-27 VITALS
OXYGEN SATURATION: 100 % | DIASTOLIC BLOOD PRESSURE: 99 MMHG | RESPIRATION RATE: 18 BRPM | HEART RATE: 84 BPM | SYSTOLIC BLOOD PRESSURE: 172 MMHG

## 2022-02-27 LAB — SARS-COV-2 RNA SPEC QL NAA+PROBE: SIGNIFICANT CHANGE UP

## 2022-02-27 RX ORDER — LABETALOL HCL 100 MG
10 TABLET ORAL ONCE
Refills: 0 | Status: COMPLETED | OUTPATIENT
Start: 2022-02-27 | End: 2022-02-27

## 2022-02-27 RX ADMIN — Medication 10 MILLIGRAM(S): at 01:50

## 2022-02-27 NOTE — ED PROVIDER NOTE - OBJECTIVE STATEMENT
47yo F hx of HTN presenting with complaints of headache. pt reports that she gets intermittent headaches, sometimes one sided other times frontal. for the past 2-3 days has been having intermittent frontal headaches, present in the morning and afternoon. relieved with tylenol. is on losartan 100mg daily, and has still been having elevated blood pressures. had been taking her blood pressure at home but her machine has since broke so unable to. states that she thinks she has sleep apnea due to bad snoring at night but hasn't been able to get a sleep study yet. 45yo F hx of HTN presenting with complaints of headache. pt reports that she gets intermittent headaches, sometimes one sided other times frontal. for the past 2-3 days has been having intermittent frontal headaches, present in the morning and afternoon. relieved with tylenol. is on losartan 100mg daily, and has still been having elevated blood pressures. had been taking her blood pressure at home but her machine has since broke so unable to. was concerned that her headache may be related to her blood pressure which prompted her coming to the ED. states that she thinks she has sleep apnea due to bad snoring at night but hasn't been able to get a sleep study yet. no f/c, blurred vision, photophobia, phonophobia, chest pain, sob, LE edema.

## 2022-02-27 NOTE — ED PROVIDER NOTE - PROGRESS NOTE DETAILS
jayden felipe pgy3: pt with persistent hypertensive despite amlodipine, improvement in headache and HR. will give pt small dose of labetolol to help lower BP. pt will need to follow up with her pmd for adjustment to her medication regimen for better blood pressure control.

## 2022-02-27 NOTE — ED PROVIDER NOTE - NSFOLLOWUPINSTRUCTIONS_ED_ALL_ED_FT
No signs of emergency medical condition on today's workup.  Presumptive diagnosis made, but further evaluation may be required by your primary care doctor or specialist for a definitive diagnosis.  Therefore, follow up as directed and if symptoms change/worsen or any emergency conditions, please return to the ER.   you were seen in the emergency department today for headache.   you can continue to take tylenol 500-1000mg every 6 hours for discomfort as needed.   you had blood work performed with no emergent findings.   your blood pressure was elevated in the ED and you were given medication to help lower it.   continue taking your prescribed home medications.   follow up with your doctor on monday concerning medication adjustments for better blood pressure control.   follow up with cardiology, a representative from the hospital will contact you to help schedule an appointment.  schedule an appointment to obtain a sleep study.   return to the emergency department for any new or worsening symptoms.       A headache is pain or discomfort felt around the head or neck area. The specific cause of a headache may not be found as there are many types including tension headaches, migraine headaches, and cluster headaches. Watch your condition for any changes. Things you can do to manage your pain include taking over the counter and prescription medications as instructed by your health care provider, lying down in a dark quiet room, limiting stress, getting regular sleep, and refraining from alcohol and tobacco products.    SEEK IMMEDIATE MEDICAL CARE IF YOU HAVE ANY OF THE FOLLOWING SYMPTOMS: fever, vomiting, stiff neck, loss of vision, problems with speech, muscle weakness, loss of balance, trouble walking, passing out, or confusion.

## 2022-02-27 NOTE — ED PROVIDER NOTE - NEUROLOGICAL, MLM
Alert and oriented, no focal deficits, no motor or sensory deficits. cranial nerves grossly intact, strength and sensation intact x4, smooth finger to nose

## 2022-02-27 NOTE — ED PROVIDER NOTE - PATIENT PORTAL LINK FT
You can access the FollowMyHealth Patient Portal offered by [facility] by registering at the following website: [email or web address]. By joining [employer]’s FollowMyHealth portal, you will also be able to view your health information using other applications (apps) compatible with our system.

## 2022-02-27 NOTE — ED PROVIDER NOTE - NSFOLLOWUPCLINICS_GEN_ALL_ED_FT
[facility]  Internal Medicine  [address] [place] [address]  Phone: [phone]  Fax: [phone]

## 2022-02-27 NOTE — ED ADULT NURSE NOTE - OBJECTIVE STATEMENT
pt rcd to rm 05. pt A&Ox4 c/o of headache for the last 2 days. pt c/o of palpitations and intermittent lightheadedness. pt c/o of localized non-radiating chest pain. pt denies visual disturbances. pt denies chest pain, sob at this time. pt denies nausea, vomiting, dizziness at this time. pmhx of HTN. pt tachy on the monitor. pt respirations even and unlabored with no accessory muscle use. 20G to the RAC. labs collected and sent. pt medicated as per md orders. will continue to monitor.

## 2022-02-27 NOTE — ED PROVIDER NOTE - CLINICAL SUMMARY MEDICAL DECISION MAKING FREE TEXT BOX
45yo F presenting with complaints of intermittent frontal headache, relieved with tylenol for the past several days. concerned may be related to her blood pressure. hypertensive in the ED, on losartan 100mg once daily which pt took this afternoon. HA 4/10 with no neurological deficits. will obtain labs, analgesia, nonischemic ekg, and BP control. anticipate dc with pmd follow up for medication adjustments for better blood pressure control.

## 2022-02-27 NOTE — ED PROVIDER NOTE - ATTENDING CONTRIBUTION TO CARE
JOINT PAIN Agree with above- well appearing otherwise healthy female presents with 1 week of intermittent headaches w/o n/v, vision changes, neuro deficits. Exam reassuring, no focal deficits, currently well appearing, suspect possible tension headaches, will refer for outpt f/u for further evaluation.

## 2022-02-27 NOTE — ED ADULT NURSE REASSESSMENT NOTE - NS ED NURSE REASSESS COMMENT FT1
pt has no new complaints at this time. pt respirations even and unlabored with no accessory muscle use. MD aware of blood pressure. will continue to monitor.

## 2022-02-28 ENCOUNTER — NON-APPOINTMENT (OUTPATIENT)
Age: 47
End: 2022-02-28

## 2022-02-28 ENCOUNTER — EMERGENCY (EMERGENCY)
Facility: HOSPITAL | Age: 47
LOS: 1 days | Discharge: HOME | End: 2022-02-28
Attending: EMERGENCY MEDICINE | Admitting: EMERGENCY MEDICINE
Payer: COMMERCIAL

## 2022-02-28 VITALS
SYSTOLIC BLOOD PRESSURE: 193 MMHG | TEMPERATURE: 97 F | HEART RATE: 99 BPM | RESPIRATION RATE: 18 BRPM | DIASTOLIC BLOOD PRESSURE: 117 MMHG | HEIGHT: 58 IN | OXYGEN SATURATION: 100 %

## 2022-02-28 DIAGNOSIS — I10 ESSENTIAL (PRIMARY) HYPERTENSION: ICD-10-CM

## 2022-02-28 LAB
ALBUMIN SERPL ELPH-MCNC: 4.3 G/DL — SIGNIFICANT CHANGE UP (ref 3.3–5)
ALP SERPL-CCNC: 93 U/L — SIGNIFICANT CHANGE UP (ref 40–120)
ALT FLD-CCNC: 14 U/L — SIGNIFICANT CHANGE UP (ref 4–33)
ANION GAP SERPL CALC-SCNC: 10 MMOL/L — SIGNIFICANT CHANGE UP (ref 7–14)
AST SERPL-CCNC: 18 U/L — SIGNIFICANT CHANGE UP (ref 4–32)
BASOPHILS # BLD AUTO: 0.07 K/UL — SIGNIFICANT CHANGE UP (ref 0–0.2)
BASOPHILS NFR BLD AUTO: 1 % — SIGNIFICANT CHANGE UP (ref 0–2)
BILIRUB SERPL-MCNC: <0.2 MG/DL — SIGNIFICANT CHANGE UP (ref 0.2–1.2)
BUN SERPL-MCNC: 18 MG/DL — SIGNIFICANT CHANGE UP (ref 7–23)
CALCIUM SERPL-MCNC: 9.4 MG/DL — SIGNIFICANT CHANGE UP (ref 8.4–10.5)
CHLORIDE SERPL-SCNC: 103 MMOL/L — SIGNIFICANT CHANGE UP (ref 98–107)
CO2 SERPL-SCNC: 24 MMOL/L — SIGNIFICANT CHANGE UP (ref 22–31)
CREAT SERPL-MCNC: 0.79 MG/DL — SIGNIFICANT CHANGE UP (ref 0.5–1.3)
EGFR: 95 ML/MIN/1.73M2 — SIGNIFICANT CHANGE UP
EOSINOPHIL # BLD AUTO: 0.3 K/UL — SIGNIFICANT CHANGE UP (ref 0–0.5)
EOSINOPHIL NFR BLD AUTO: 4.3 % — SIGNIFICANT CHANGE UP (ref 0–6)
GLUCOSE SERPL-MCNC: 94 MG/DL — SIGNIFICANT CHANGE UP (ref 70–99)
HCT VFR BLD CALC: 39.7 % — SIGNIFICANT CHANGE UP (ref 34.5–45)
HGB BLD-MCNC: 13.2 G/DL — SIGNIFICANT CHANGE UP (ref 11.5–15.5)
IANC: 4.67 K/UL — SIGNIFICANT CHANGE UP (ref 1.5–8.5)
IMM GRANULOCYTES NFR BLD AUTO: 0.1 % — SIGNIFICANT CHANGE UP (ref 0–1.5)
LYMPHOCYTES # BLD AUTO: 1.16 K/UL — SIGNIFICANT CHANGE UP (ref 1–3.3)
LYMPHOCYTES # BLD AUTO: 16.6 % — SIGNIFICANT CHANGE UP (ref 13–44)
MCHC RBC-ENTMCNC: 27.3 PG — SIGNIFICANT CHANGE UP (ref 27–34)
MCHC RBC-ENTMCNC: 33.2 GM/DL — SIGNIFICANT CHANGE UP (ref 32–36)
MCV RBC AUTO: 82.2 FL — SIGNIFICANT CHANGE UP (ref 80–100)
MONOCYTES # BLD AUTO: 0.78 K/UL — SIGNIFICANT CHANGE UP (ref 0–0.9)
MONOCYTES NFR BLD AUTO: 11.2 % — SIGNIFICANT CHANGE UP (ref 2–14)
NEUTROPHILS # BLD AUTO: 4.67 K/UL — SIGNIFICANT CHANGE UP (ref 1.8–7.4)
NEUTROPHILS NFR BLD AUTO: 66.8 % — SIGNIFICANT CHANGE UP (ref 43–77)
NRBC # BLD: 0 /100 WBCS — SIGNIFICANT CHANGE UP
NRBC # FLD: 0 K/UL — SIGNIFICANT CHANGE UP
PLATELET # BLD AUTO: 311 K/UL — SIGNIFICANT CHANGE UP (ref 150–400)
POTASSIUM SERPL-MCNC: 3.8 MMOL/L — SIGNIFICANT CHANGE UP (ref 3.5–5.3)
POTASSIUM SERPL-SCNC: 3.8 MMOL/L — SIGNIFICANT CHANGE UP (ref 3.5–5.3)
PROT SERPL-MCNC: 7.4 G/DL — SIGNIFICANT CHANGE UP (ref 6–8.3)
RBC # BLD: 4.83 M/UL — SIGNIFICANT CHANGE UP (ref 3.8–5.2)
RBC # FLD: 13.7 % — SIGNIFICANT CHANGE UP (ref 10.3–14.5)
SODIUM SERPL-SCNC: 137 MMOL/L — SIGNIFICANT CHANGE UP (ref 135–145)
TROPONIN T, HIGH SENSITIVITY RESULT: 8 NG/L — SIGNIFICANT CHANGE UP
TSH SERPL-MCNC: 1.99 UIU/ML — SIGNIFICANT CHANGE UP (ref 0.27–4.2)
WBC # BLD: 6.99 K/UL — SIGNIFICANT CHANGE UP (ref 3.8–10.5)
WBC # FLD AUTO: 6.99 K/UL — SIGNIFICANT CHANGE UP (ref 3.8–10.5)

## 2022-02-28 PROCEDURE — 93010 ELECTROCARDIOGRAM REPORT: CPT | Mod: 59

## 2022-02-28 PROCEDURE — 99285 EMERGENCY DEPT VISIT HI MDM: CPT | Mod: 25

## 2022-02-28 RX ORDER — LOSARTAN POTASSIUM 100 MG/1
100 TABLET, FILM COATED ORAL ONCE
Refills: 0 | Status: COMPLETED | OUTPATIENT
Start: 2022-02-28 | End: 2022-02-28

## 2022-02-28 RX ORDER — SODIUM CHLORIDE 9 MG/ML
1000 INJECTION INTRAMUSCULAR; INTRAVENOUS; SUBCUTANEOUS ONCE
Refills: 0 | Status: COMPLETED | OUTPATIENT
Start: 2022-02-28 | End: 2022-02-28

## 2022-02-28 RX ADMIN — LOSARTAN POTASSIUM 100 MILLIGRAM(S): 100 TABLET, FILM COATED ORAL at 23:26

## 2022-02-28 RX ADMIN — SODIUM CHLORIDE 1000 MILLILITER(S): 9 INJECTION INTRAMUSCULAR; INTRAVENOUS; SUBCUTANEOUS at 22:21

## 2022-02-28 NOTE — ED PROVIDER NOTE - ATTENDING CONTRIBUTION TO CARE
GEN - NAD; well appearing; A+O x3   HEAD - NC/AT   EYES- PERRL, EOMI  ENT: Airway patent, mmm, Oral cavity and pharynx normal. No inflammation, swelling, exudate, or lesions.  NECK: Neck supple, non-tender without lymphadenopathy, no masses.  PULMONARY - CTA b/l, symmetric breath sounds.   CARDIAC -s1s2, RRR, no M,G,R  ABDOMEN - +BS, ND, NT, soft, no guarding, no rebound, no masses   BACK - no CVA tenderness, Normal  spine   EXTREMITIES - FROM, symmetric pulses, capillary refill < 2 seconds, no edema   SKIN - linear scratch marks noted to b/l ue, no skin break, no bruising  NEUROLOGIC - ao3, cn2-12 intact, 5/5 strength in all extremities, sensation grossly intact, gait steady  PSYCH -alert, follows commands, linear thought process, anxious  a/p-patient presents to the ED with fatigue which started 1 year ago, intermittent, has been constant since the beginning of this year, occ has palpitations (none today), notes feeling very stressed due to personal family circumstances. No fevers, cough, cp, sob, abd pain, vomiting, diarrhea, dysuria, edema. Has h/o htn for which she takes losartan 100mg, did not take yet today. Does also note period has become more irregular recently and has been having occasional hot feelings sporadically over last few months. On exam appears nontoxic, clear lungs, abd soft/nt, no edema, elevated bp noted and patient has not yet taken her bp med today. Reviewed w/u from few days prior-patient concerned about her thyroid. Will check labs, ekg, eval for anemia, elec disturbance, organ dysfunction, monitor for arrythmia, discussed possibly perimenopausal period given constellation of symptoms and recommended pmd, and cardiology f/u if w/u negative.

## 2022-02-28 NOTE — ED ADULT NURSE NOTE - OBJECTIVE STATEMENT
47 y/o female, a&ox4, received to intake from 10A with c/o fatigue. Pt endorses fatigue, weakness, and heart palpitations for the past few days, denies CP, SOB, or difficulty breathing at this time. Pmh of HTN, pt says she is compliant with home medication. MD Blackman notified of elevated blood pressure. Pt appears anxious, lights were turned down to reduce stimuli. 22G IV placed to right forearm, labs collected and sent off. Pt medicated as per MD orders. Respirations are even and unlabored, no signs of respiratory distress.

## 2022-02-28 NOTE — PLAN
[FreeTextEntry7] :  tasked for assistance in making an appointment.  [FreeTextEntry3] : 45yo F hx of HTN presenting with complaints of\par headache. pt reports that she gets intermittent headaches, sometimes one sided\par other times frontal. for the past 2-3 days has been having intermittent frontal\par headaches, present in the morning and afternoon. relieved with tylenol. is on\par losartan 100mg daily, and has still been having elevated blood pressures. had\par been taking her blood pressure at home but her machine has since broke so\par unable to. was concerned that her headache may be related to her blood pressure\par which prompted her coming to the ED. states that she thinks she has sleep apnea\par due to bad snoring at night but hasn't been able to get a sleep study yet. no\par f/c, blurred vision, photophobia, phonophobia, chest pain, sob, LE edema.

## 2022-02-28 NOTE — ED PROVIDER NOTE - PATIENT PORTAL LINK FT
You can access the FollowMyHealth Patient Portal offered by [facility] by registering at the following website: [email or web address]. By joining [employer]’s FollowMyHealth portal, you will also be able to view your health information using other applications (apps) compatible with our system.

## 2022-02-28 NOTE — ED ADULT NURSE NOTE - DISCHARGE DATE/TIME
Doxycycline Pregnancy And Lactation Text: This medication is Pregnancy Category D and not consider safe during pregnancy. It is also excreted in breast milk but is considered safe for shorter treatment courses. Birth Control Pills Counseling: Birth Control Pill Counseling: I discussed with the patient the potential side effects of OCPs including but not limited to increased risk of stroke, heart attack, thrombophlebitis, deep venous thrombosis, hepatic adenomas, breast changes, GI upset, headaches, and depression.  The patient verbalized understanding of the proper use and possible adverse effects of OCPs. All of the patient's questions and concerns were addressed. Include Pregnancy/Lactation Warning?: No Tetracycline Pregnancy And Lactation Text: This medication is Pregnancy Category D and not consider safe during pregnancy. It is also excreted in breast milk. Bactrim Counseling:  I discussed with the patient the risks of sulfa antibiotics including but not limited to GI upset, allergic reaction, drug rash, diarrhea, dizziness, photosensitivity, and yeast infections.  Rarely, more serious reactions can occur including but not limited to aplastic anemia, agranulocytosis, methemoglobinemia, blood dyscrasias, liver or kidney failure, lung infiltrates or desquamative/blistering drug rashes. Topical Sulfur Applications Pregnancy And Lactation Text: This medication is Pregnancy Category C and has an unknown safety profile during pregnancy. It is unknown if this topical medication is excreted in breast milk. Azithromycin Pregnancy And Lactation Text: This medication is considered safe during pregnancy and is also secreted in breast milk. Tazorac Counseling:  Patient advised that medication is irritating and drying.  Patient may need to apply sparingly and wash off after an hour before eventually leaving it on overnight.  The patient verbalized understanding of the proper use and possible adverse effects of tazorac.  All of the patient's questions and concerns were addressed. Topical Retinoid counseling:  Patient advised to apply a pea-sized amount only at bedtime and wait 30 minutes after washing their face before applying.  If too drying, patient may add a non-comedogenic moisturizer. The patient verbalized understanding of the proper use and possible adverse effects of retinoids.  All of the patient's questions and concerns were addressed. Topical Retinoid Pregnancy And Lactation Text: This medication is Pregnancy Category C. It is unknown if this medication is excreted in breast milk. Isotretinoin Pregnancy And Lactation Text: This medication is Pregnancy Category X and is considered extremely dangerous during pregnancy. It is unknown if it is excreted in breast milk. Benzoyl Peroxide Pregnancy And Lactation Text: This medication is Pregnancy Category C. It is unknown if benzoyl peroxide is excreted in breast milk. Spironolactone Pregnancy And Lactation Text: This medication can cause feminization of the male fetus and should be avoided during pregnancy. The active metabolite is also found in breast milk. Azithromycin Counseling:  I discussed with the patient the risks of azithromycin including but not limited to GI upset, allergic reaction, drug rash, diarrhea, and yeast infections. Isotretinoin Counseling: Patient should get monthly blood tests, not donate blood, not drive at night if vision affected, not share medication, and not undergo elective surgery for 6 months after tx completed. Side effects reviewed, pt to contact office should one occur. Dapsone Counseling: I discussed with the patient the risks of dapsone including but not limited to hemolytic anemia, agranulocytosis, rashes, methemoglobinemia, kidney failure, peripheral neuropathy, headaches, GI upset, and liver toxicity.  Patients who start dapsone require monitoring including baseline LFTs and weekly CBCs for the first month, then every month thereafter.  The patient verbalized understanding of the proper use and possible adverse effects of dapsone.  All of the patient's questions and concerns were addressed. Sarecycline Counseling: Patient advised regarding possible photosensitivity and discoloration of the teeth, skin, lips, tongue and gums.  Patient instructed to avoid sunlight, if possible.  When exposed to sunlight, patients should wear protective clothing, sunglasses, and sunscreen.  The patient was instructed to call the office immediately if the following severe adverse effects occur:  hearing changes, easy bruising/bleeding, severe headache, or vision changes.  The patient verbalized understanding of the proper use and possible adverse effects of sarecycline.  All of the patient's questions and concerns were addressed. Tetracycline Counseling: Patient counseled regarding possible photosensitivity and increased risk for sunburn.  Patient instructed to avoid sunlight, if possible.  When exposed to sunlight, patients should wear protective clothing, sunglasses, and sunscreen.  The patient was instructed to call the office immediately if the following severe adverse effects occur:  hearing changes, easy bruising/bleeding, severe headache, or vision changes.  The patient verbalized understanding of the proper use and possible adverse effects of tetracycline.  All of the patient's questions and concerns were addressed. Patient understands to avoid pregnancy while on therapy due to potential birth defects. Detail Level: Detailed Spironolactone Counseling: Patient advised regarding risks of diarrhea, abdominal pain, hyperkalemia, birth defects (for female patients), liver toxicity and renal toxicity. The patient may need blood work to monitor liver and kidney function and potassium levels while on therapy. The patient verbalized understanding of the proper use and possible adverse effects of spironolactone.  All of the patient's questions and concerns were addressed. Topical Clindamycin Pregnancy And Lactation Text: This medication is Pregnancy Category B and is considered safe during pregnancy. It is unknown if it is excreted in breast milk. Doxycycline Counseling:  Patient counseled regarding possible photosensitivity and increased risk for sunburn.  Patient instructed to avoid sunlight, if possible.  When exposed to sunlight, patients should wear protective clothing, sunglasses, and sunscreen.  The patient was instructed to call the office immediately if the following severe adverse effects occur:  hearing changes, easy bruising/bleeding, severe headache, or vision changes.  The patient verbalized understanding of the proper use and possible adverse effects of doxycycline.  All of the patient's questions and concerns were addressed. Topical Sulfur Applications Counseling: Topical Sulfur Counseling: Patient counseled that this medication may cause skin irritation or allergic reactions.  In the event of skin irritation, the patient was advised to reduce the amount of the drug applied or use it less frequently.   The patient verbalized understanding of the proper use and possible adverse effects of topical sulfur application.  All of the patient's questions and concerns were addressed. Minocycline Counseling: Patient advised regarding possible photosensitivity and discoloration of the teeth, skin, lips, tongue and gums.  Patient instructed to avoid sunlight, if possible.  When exposed to sunlight, patients should wear protective clothing, sunglasses, and sunscreen.  The patient was instructed to call the office immediately if the following severe adverse effects occur:  hearing changes, easy bruising/bleeding, severe headache, or vision changes.  The patient verbalized understanding of the proper use and possible adverse effects of minocycline.  All of the patient's questions and concerns were addressed. Dapsone Pregnancy And Lactation Text: This medication is Pregnancy Category C and is not considered safe during pregnancy or breast feeding. High Dose Vitamin A Pregnancy And Lactation Text: High dose vitamin A therapy is contraindicated during pregnancy and breast feeding. Bactrim Pregnancy And Lactation Text: This medication is Pregnancy Category D and is known to cause fetal risk.  It is also excreted in breast milk. Erythromycin Pregnancy And Lactation Text: This medication is Pregnancy Category B and is considered safe during pregnancy. It is also excreted in breast milk. Erythromycin Counseling:  I discussed with the patient the risks of erythromycin including but not limited to GI upset, allergic reaction, drug rash, diarrhea, increase in liver enzymes, and yeast infections. Topical Clindamycin Counseling: Patient counseled that this medication may cause skin irritation or allergic reactions.  In the event of skin irritation, the patient was advised to reduce the amount of the drug applied or use it less frequently.   The patient verbalized understanding of the proper use and possible adverse effects of clindamycin.  All of the patient's questions and concerns were addressed. Benzoyl Peroxide Counseling: Patient counseled that medicine may cause skin irritation and bleach clothing.  In the event of skin irritation, the patient was advised to reduce the amount of the drug applied or use it less frequently.   The patient verbalized understanding of the proper use and possible adverse effects of benzoyl peroxide.  All of the patient's questions and concerns were addressed. High Dose Vitamin A Counseling: Side effects reviewed, pt to contact office should one occur. Birth Control Pills Pregnancy And Lactation Text: This medication should be avoided if pregnant and for the first 30 days post-partum. Tazorac Pregnancy And Lactation Text: This medication is not safe during pregnancy. It is unknown if this medication is excreted in breast milk. 01-Mar-2022 02:26

## 2022-02-28 NOTE — ED PROVIDER NOTE - OBJECTIVE STATEMENT
45 y/o female with pmhx of HTN, psoriasis presents to ED c/o fatigue and palpitations x 3 weeks. Pt states she had an episode of fatigue and was concerned. 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 Pt states she sometimes gets palpitations, none at present. States similar episode happened 1 year ago, has not seen cardiology. Pt states her brother had colon cancer, last 2 years has been very stressful. He is cancer free now. States she had a normal colonoscopy this year. Pt states she started to google her symptoms and is very concerned. Pt seen at [facility] few days ago for headache. Per chart review pt had normal h/h, negative covid test. Pt states she is worried she could be anemic or something is wrong with her thyroid. Pt states she thinks she is stressed, but is also 46 years old and wants to make sure everything is okay. Pt denies difficulty sleeping. Pt eating normally. No cp, sob, abd pain, syncope. No ocps or estrogen use, recent travel, surgeries, hospitalizations, le edema, calf pain, hx of dvt/pe.

## 2022-02-28 NOTE — DISCUSSION/SUMMARY
[ED Visit] : a visit to ED [Follow Up Appointment] : follow up appointment with provider [FreeTextEntry1] : as above

## 2022-02-28 NOTE — ED PROVIDER NOTE - CLINICAL SUMMARY MEDICAL DECISION MAKING FREE TEXT BOX
47 y/o female with pmhx of HTN, psoriasis presents to ED c/o fatigue and palpitations x 3 weeks. Pt states she had an episode of fatigue and was concerned. 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 Pt states she sometimes gets palpitations, none at present. States similar episode happened 1 year ago, has not seen cardiology. Pt states her brother had colon cancer last 2 years and pt known to goggle symptoms. No cp, no pe risk factors. no SOB. pt anxious on exam. plan to check labs including troponin, TSH. discussed outpatient follow up with cardiology, PMD and psychiatry for anxiety

## 2022-02-28 NOTE — ED ADULT TRIAGE NOTE - CHIEF COMPLAINT QUOTE
Pt c/o feeling fatigued and weakness x1 week. c/o intermittent palpitations and having irregular menstrual cycle. Pt appears anxious, states she is compliant with her Blood Pressure medications. Pt requesting to have her TSH level checked, recently seen in this ER two days ago treated and discharged.

## 2022-03-01 ENCOUNTER — NON-APPOINTMENT (OUTPATIENT)
Age: 47
End: 2022-03-01

## 2022-03-01 VITALS
DIASTOLIC BLOOD PRESSURE: 103 MMHG | OXYGEN SATURATION: 100 % | RESPIRATION RATE: 18 BRPM | HEART RATE: 84 BPM | TEMPERATURE: 98 F | SYSTOLIC BLOOD PRESSURE: 188 MMHG

## 2022-03-01 LAB — TROPONIN T, HIGH SENSITIVITY RESULT: 7 NG/L — SIGNIFICANT CHANGE UP

## 2022-03-01 NOTE — ED POST DISCHARGE NOTE - RESULT SUMMARY
Patient called asking to  work note. States she was told she could get 3 days off (would return 3/3/22). Pt seen 2/28. Realized she did not have work note in her dc papers. States she will come tomorrow AM to  note. Aware to call back ED prior to arrival for assistance. All questions and concerns addressed. Strict return instructions given.

## 2022-05-31 ENCOUNTER — APPOINTMENT (OUTPATIENT)
Dept: INTERNAL MEDICINE | Facility: CLINIC | Age: 47
End: 2022-05-31

## 2022-06-01 NOTE — DISCHARGE NOTE NURSING/CASE MANAGEMENT/SOCIAL WORK - NSDCPEFALRISK_GEN_ALL_CORE
Patient information on fall and injury prevention Gabapentin Counseling: I discussed with the patient the risks of gabapentin including but not limited to dizziness, somnolence, fatigue and ataxia.

## 2022-07-17 ENCOUNTER — EMERGENCY (EMERGENCY)
Facility: HOSPITAL | Age: 47
LOS: 1 days | Discharge: HOME | End: 2022-07-17
Attending: EMERGENCY MEDICINE | Admitting: EMERGENCY MEDICINE

## 2022-07-17 VITALS
TEMPERATURE: 98 F | DIASTOLIC BLOOD PRESSURE: 104 MMHG | SYSTOLIC BLOOD PRESSURE: 171 MMHG | OXYGEN SATURATION: 100 % | HEART RATE: 85 BPM | RESPIRATION RATE: 16 BRPM

## 2022-07-17 VITALS
TEMPERATURE: 98 F | SYSTOLIC BLOOD PRESSURE: 182 MMHG | OXYGEN SATURATION: 100 % | HEART RATE: 78 BPM | HEIGHT: 58 IN | DIASTOLIC BLOOD PRESSURE: 100 MMHG | RESPIRATION RATE: 16 BRPM

## 2022-07-17 LAB
ALBUMIN SERPL ELPH-MCNC: 4.2 G/DL — SIGNIFICANT CHANGE UP (ref 3.3–5)
ALP SERPL-CCNC: 73 U/L — SIGNIFICANT CHANGE UP (ref 40–120)
ALT FLD-CCNC: 17 U/L — SIGNIFICANT CHANGE UP (ref 4–33)
ANION GAP SERPL CALC-SCNC: 13 MMOL/L — SIGNIFICANT CHANGE UP (ref 7–14)
AST SERPL-CCNC: 39 U/L — HIGH (ref 4–32)
BASOPHILS # BLD AUTO: 0.1 K/UL — SIGNIFICANT CHANGE UP (ref 0–0.2)
BASOPHILS NFR BLD AUTO: 1.2 % — SIGNIFICANT CHANGE UP (ref 0–2)
BILIRUB SERPL-MCNC: 0.4 MG/DL — SIGNIFICANT CHANGE UP (ref 0.2–1.2)
BUN SERPL-MCNC: 14 MG/DL — SIGNIFICANT CHANGE UP (ref 7–23)
CALCIUM SERPL-MCNC: 8.8 MG/DL — SIGNIFICANT CHANGE UP (ref 8.4–10.5)
CHLORIDE SERPL-SCNC: 100 MMOL/L — SIGNIFICANT CHANGE UP (ref 98–107)
CO2 SERPL-SCNC: 22 MMOL/L — SIGNIFICANT CHANGE UP (ref 22–31)
CREAT SERPL-MCNC: 0.75 MG/DL — SIGNIFICANT CHANGE UP (ref 0.5–1.3)
EGFR: 101 ML/MIN/1.73M2 — SIGNIFICANT CHANGE UP
EOSINOPHIL # BLD AUTO: 0.13 K/UL — SIGNIFICANT CHANGE UP (ref 0–0.5)
EOSINOPHIL NFR BLD AUTO: 1.6 % — SIGNIFICANT CHANGE UP (ref 0–6)
GLUCOSE SERPL-MCNC: 90 MG/DL — SIGNIFICANT CHANGE UP (ref 70–99)
HCT VFR BLD CALC: 42.2 % — SIGNIFICANT CHANGE UP (ref 34.5–45)
HGB BLD-MCNC: 13.7 G/DL — SIGNIFICANT CHANGE UP (ref 11.5–15.5)
IANC: 6.06 K/UL — SIGNIFICANT CHANGE UP (ref 1.8–7.4)
IMM GRANULOCYTES NFR BLD AUTO: 0.2 % — SIGNIFICANT CHANGE UP (ref 0–1.5)
LYMPHOCYTES # BLD AUTO: 0.92 K/UL — LOW (ref 1–3.3)
LYMPHOCYTES # BLD AUTO: 11.5 % — LOW (ref 13–44)
MAGNESIUM SERPL-MCNC: 2 MG/DL — SIGNIFICANT CHANGE UP (ref 1.6–2.6)
MCHC RBC-ENTMCNC: 27.7 PG — SIGNIFICANT CHANGE UP (ref 27–34)
MCHC RBC-ENTMCNC: 32.5 GM/DL — SIGNIFICANT CHANGE UP (ref 32–36)
MCV RBC AUTO: 85.3 FL — SIGNIFICANT CHANGE UP (ref 80–100)
MONOCYTES # BLD AUTO: 0.8 K/UL — SIGNIFICANT CHANGE UP (ref 0–0.9)
MONOCYTES NFR BLD AUTO: 10 % — SIGNIFICANT CHANGE UP (ref 2–14)
NEUTROPHILS # BLD AUTO: 6.06 K/UL — SIGNIFICANT CHANGE UP (ref 1.8–7.4)
NEUTROPHILS NFR BLD AUTO: 75.5 % — SIGNIFICANT CHANGE UP (ref 43–77)
NRBC # BLD: 0 /100 WBCS — SIGNIFICANT CHANGE UP
NRBC # FLD: 0 K/UL — SIGNIFICANT CHANGE UP
PHOSPHATE SERPL-MCNC: 3.3 MG/DL — SIGNIFICANT CHANGE UP (ref 2.5–4.5)
PLATELET # BLD AUTO: 415 K/UL — HIGH (ref 150–400)
POTASSIUM SERPL-MCNC: 4.4 MMOL/L — SIGNIFICANT CHANGE UP (ref 3.5–5.3)
POTASSIUM SERPL-SCNC: 4.4 MMOL/L — SIGNIFICANT CHANGE UP (ref 3.5–5.3)
PROT SERPL-MCNC: 8.4 G/DL — HIGH (ref 6–8.3)
RBC # BLD: 4.95 M/UL — SIGNIFICANT CHANGE UP (ref 3.8–5.2)
RBC # FLD: 12.9 % — SIGNIFICANT CHANGE UP (ref 10.3–14.5)
SODIUM SERPL-SCNC: 135 MMOL/L — SIGNIFICANT CHANGE UP (ref 135–145)
TROPONIN T, HIGH SENSITIVITY RESULT: 7 NG/L — SIGNIFICANT CHANGE UP
TROPONIN T, HIGH SENSITIVITY RESULT: <6 NG/L — SIGNIFICANT CHANGE UP
TROPONIN T, HIGH SENSITIVITY RESULT: <6 NG/L — SIGNIFICANT CHANGE UP
WBC # BLD: 8.03 K/UL — SIGNIFICANT CHANGE UP (ref 3.8–10.5)
WBC # FLD AUTO: 8.03 K/UL — SIGNIFICANT CHANGE UP (ref 3.8–10.5)

## 2022-07-17 PROCEDURE — 99285 EMERGENCY DEPT VISIT HI MDM: CPT

## 2022-07-17 NOTE — ED PROVIDER NOTE - PATIENT PORTAL LINK FT
You can access the FollowMyHealth Patient Portal offered by [facility] by registering at the following website: [email or web address]. By joining [employer]’s FollowMyHealth portal, you will also be able to view your health information using other applications (apps) compatible with our system.

## 2022-07-17 NOTE — ED PROVIDER NOTE - PHYSICAL EXAMINATION
Attending/Omar: Well-appearing, NAD; PERRL/EOMI, Fundocopic Exam-no A-V nicking, rental hemorrhages,, supple, no ALIZA, no JVD, RRR, CTAB; Abd-soft, NT/ND, no HSM; no LE edema, A&Ox3, nonfocal; Skin-warm/dry

## 2022-07-17 NOTE — ED PROVIDER NOTE - OBJECTIVE STATEMENT
47 y/o female with pmhx of HTN on losartan ( not complaint, forgets for a few days) presents to ED c/o elevated blood pressures at home. Pt states her  got a new blood pressure machine at home and tested it on herself yesterday, states had a reading of 200 systolic with a small blood pressure, states she is too big for it. States when she saw the number she was scared she was going to have a stroke, heart attack or aneurysm. At that point patient became dizzy. Pt denies chest pain, sob, palpitations, syncope, n/v , weakness, diaphoresis. Pt states her potassium has been low in the past due to blood pressure medicine losartan and felt dizzy years ago 47 y/o female with pmhx of HTN on losartan ( not complaint, forgets for a few days) presents to ED c/o elevated blood pressures at home. Pt states her  got a new blood pressure machine at home and tested it on herself yesterday, states had a reading of 200 systolic with a small blood pressure, states she is too big for it. States when she saw the number she was scared she was going to have a stroke, heart attack or aneurysm. At that point patient became dizzy. Pt denies chest pain, sob, palpitations, syncope, n/v , weakness, diaphoresis. Pt states her potassium has been low in the past due to blood pressure medicine losartan and felt dizzy years ago    Attending/Omar: 45 yo F as described above, had checked her blood pressure suing her 's home blood pressure machine which revealed to be elevated. Pt reports becoming anxious afterwards. She also reported to being noncompliant with her antihypertensive medication, Losartan. Denies CP, palp, SOB, weakness or lightheadedness.

## 2022-07-17 NOTE — ED PROVIDER NOTE - NSFOLLOWUPINSTRUCTIONS_ED_ALL_ED_FT
Follow up with your primary care provider     Worsening, continued or new concerning symptoms return to the emergency department.

## 2022-07-17 NOTE — ED ADULT NURSE NOTE - CHIEF COMPLAINT QUOTE
reports elevated bp yesterday ,today. dizziness intermittently x 1 wk. took  bp meds this am- states does  not always take regularly  fs 85

## 2022-07-17 NOTE — ED ADULT NURSE NOTE - OBJECTIVE STATEMENT
pt A&ox4, came to ED for hypertension, pt states she felt dizzy and took her BP with BP cuff that is small on her arm. at this time pt has no complaints. breathing is spontaneous and unlabored. sating 99% on RA. bilateral pedal and radial pulses palpable and strong. right ac 20g IV placed Labs drawn and sent as per ordered. Bed in lowest position, call bell within reach, all other safety and comfort measures provided. awaiting labs results and further orders.

## 2022-07-17 NOTE — ED PROVIDER NOTE - NS ED ATTENDING STATEMENT MOD
This was a shared visit with the J[employer]ON. I reviewed and verified the documentation and independently performed the documented:

## 2022-07-17 NOTE — ED PROVIDER NOTE - CLINICAL SUMMARY MEDICAL DECISION MAKING FREE TEXT BOX
45 y/o female with pmhx of HTN on losartan presents to ED c/o elevated blood pressures at home. Pt states her  got a new blood pressure machine at home and tested it on herself yesterday, states had a reading of 200 systolic with a small blood pressure, states she is too big for it. States when she saw the number she was scared she was going to have a stroke, heart attack or aneurysm. At that point patient became dizzy. Pt states her potassium has been low in the past due to blood pressure medicine losartan and felt dizzy years ago. Pt denies chest pain, sob, palpitations, syncope, n/v , weakness, diaphoresis. pt denies dizziness at present, admits to feeling anxious. plan to check labs, troponin, dc home w/ pmd follow up

## 2022-07-17 NOTE — ED ADULT TRIAGE NOTE - CHIEF COMPLAINT QUOTE
reports elevated bp yesterday ,today. dizziness intermittently x 1 wk. took  bp meds this am- states does  not always take regularly  fs 88 reports elevated bp yesterday ,today. dizziness intermittently x 1 wk. took  bp meds this am- states does  not always take regularly  fs 85

## 2022-07-18 ENCOUNTER — NON-APPOINTMENT (OUTPATIENT)
Age: 47
End: 2022-07-18

## 2022-09-01 ENCOUNTER — EMERGENCY (EMERGENCY)
Facility: HOSPITAL | Age: 47
LOS: 1 days | Discharge: LEFT BEFORE DISCHARGE | End: 2022-09-01
Attending: EMERGENCY MEDICINE | Admitting: EMERGENCY MEDICINE

## 2022-09-01 VITALS
HEART RATE: 89 BPM | OXYGEN SATURATION: 99 % | HEIGHT: 58 IN | SYSTOLIC BLOOD PRESSURE: 165 MMHG | TEMPERATURE: 98 F | RESPIRATION RATE: 15 BRPM | DIASTOLIC BLOOD PRESSURE: 92 MMHG

## 2022-09-01 PROCEDURE — L9991: CPT

## 2022-09-01 NOTE — ED ADULT TRIAGE NOTE - CHIEF COMPLAINT QUOTE
alert no distress c/o  high blood pressure  and palpitations started losartan and amlodipine 5 days ago  c/o dizziness started today  PMHx HTN

## 2022-09-01 NOTE — ED ADULT NURSE NOTE - OBJECTIVE STATEMENT
pt presents to room stating "I want to  leave. I came here thinking I am having aortic dissection or heart attack but now I think it's my anxiety. I want to leave" pt will not be persuaded to wait to see Dr. continues to object to every explanation that RN gave and does not want to entertain the idea of waiting to see MD before leaving. she said for the period she had waited in the waiting area, she has got time to relax and now a she know "it is all anxiety and psychological." she said she has appointment with her Dr this morning and will just follow-up there instead. Blue team attending Bernie notified pt is eloping. currently pt denies any complaints. pt noted to be walking with steady gait, AAOx4 breathing well on RA and in NAD at this time.

## 2022-09-01 NOTE — ED ADULT TRIAGE NOTE - BP NONINVASIVE SYSTOLIC (MM HG)
Per physician instructions  If you have questions or concerns on any instructions given to you by your provider today or if you need to schedule an appointment, you can reach us at [phone].                  165

## 2022-09-01 NOTE — ED ADULT NURSE NOTE - EXPLANATION OF PATIENT'S REASON FOR LEAVING
she said she is feeling better, believed her initial concerns were psychological, she plans to follow-up with her Dr ortiz AM.  and currently has no complaints.

## 2022-09-01 NOTE — ED ADULT TRIAGE NOTE - CADM TRG TX PRIOR TO ARRIVAL
MRN-[id number]  Patient is a 35y old  Male who presents with a chief complaint of LE Weakness (31 Aug 2022 13:56)    Subjective: Patient states he is still not able to move his lower extremities with muscle spasms.     Objective:   MEDICATIONS  (STANDING):  bictegravir 50 mG/emtricitabine 200 mG/tenofovir alafenamide 25 mG (BIKTARVY) 1 Tablet(s) Oral daily  enoxaparin Injectable 40 milliGRAM(s) SubCutaneous every 24 hours  potassium chloride   Powder 20 milliEquivalent(s) Oral once  pregabalin 50 milliGRAM(s) Oral three times a day  QUEtiapine 100 milliGRAM(s) Oral <User Schedule>  QUEtiapine 200 milliGRAM(s) Oral at bedtime    MEDICATIONS  (PRN):  acetaminophen     Tablet .. 650 milliGRAM(s) Oral every 6 hours PRN Temp greater or equal to 38C (100.4F), Mild Pain (1 - 3)  aluminum hydroxide/magnesium hydroxide/simethicone Suspension 30 milliLiter(s) Oral every 4 hours PRN Dyspepsia  baclofen 10 milliGRAM(s) Oral every 8 hours PRN Muscle Spasm  diphenhydrAMINE 25 milliGRAM(s) Oral every 6 hours PRN Rash and/or Itching  melatonin 3 milliGRAM(s) Oral at bedtime PRN Insomnia  oxyCODONE    IR 10 milliGRAM(s) Oral every 6 hours PRN Severe Pain (7 - 10)  oxyCODONE    IR 5 milliGRAM(s) Oral every 6 hours PRN Moderate Pain (4 - 6)    Vital Signs Last 24 Hrs  T(C): 36.8 (01 Sep 2022 10:13), Max: 37.1 (31 Aug 2022 15:01)  T(F): 98.2 (01 Sep 2022 10:13), Max: 98.8 (31 Aug 2022 15:01)  HR: 73 (01 Sep 2022 10:13) (68 - 76)  BP: 123/66 (01 Sep 2022 10:13) (120/71 - 128/82)  BP(mean): --  RR: 18 (01 Sep 2022 10:13) (16 - 18)  SpO2: 100% (01 Sep 2022 10:13) (98% - 100%)    Parameters below as of 01 Sep 2022 10:13  Patient On (Oxygen Delivery Method): room air        GENERAL EXAM:  Constitutional: awake and alert. NAD  HEENT: PERRLA, EOMI  Neck: Supple  Respiratory: Breath sounds are clear bilaterally  Cardiovascular: S1 and S2, regular / irregular rhythm  Gastrointestinal: soft, nontender  Extremities: no edema, no cyanosis  Vascular: no carotid bruits  Musculoskeletal: no joint swelling/tenderness, no abnormal movements  Skin: no rashes    NEUROLOGICAL EXAM:  MS: AAOX3, fluent, attends b/l; recent and remote memory intact; normal attention, language and fund of knowledge.   CN: VFF, EOMI, PERRL, V1-3 intact, no facial asymmetry, t/p midline, SCM/trap intact.  Motor: Strength: 5/5 in the UE b/l.   3/5 in proximal and 4-/5 distal RT LE   2/5 in the proximal and distal LE   Tone: normal. Bulk: normal.   DTR 2+ symm.    Plantar flex b/l.   Sensation: intact 4x.   Coordination: FTn intact b/l.    Gait: Deferred    Labs:   cbc                      11.9   3.90  )-----------( 255      ( 01 Sep 2022 06:09 )             37.1     [id number]    140  |  106  |  14  ----------------------------<  92  3.4<L>   |  23  |  0.82    Ca    8.8      01 Sep 2022 06:09  Phos  3.5     09-  Mg     1.60     09-    TPro  8.2  /  Alb  4.5  /  TBili  0.5  /  DBili  x   /  AST  26  /  ALT  17  /  AlkPhos  70  08-31    CardiacMarkersCARDIAC MARKERS ( 31 Aug 2022 08:36 )  x     / x     / 178 U/L / x     / x          LFTsLIVER FUNCTIONS - ( 31 Aug 2022 05:35 )  Alb: 4.5 g/dL / Pro: 8.2 g/dL / ALK PHOS: 70 U/L / ALT: 17 U/L / AST: 26 U/L / GGT: x           UAUrinalysis Basic - ( 31 Aug 2022 05:28 )    Color: Yellow / Appearance: Clear / S.033 / pH: x  Gluc: x / Ketone: Negative  / Bili: Negative / Urobili: 3 mg/dL   Blood: x / Protein: 30 mg/dL / Nitrite: Negative   Leuk Esterase: Negative / RBC: 237 /HPF / WBC 6 /HPF   Sq Epi: x / Non Sq Epi: 4 /HPF / Bacteria: Few      Radiology:  Ct head w/o contrast: Impression:  No evidence of acute hemorrhage mass or mass effect.    CT Thoracic/Lumbar:   IMPRESSION: Unremarkable CT scan of the thoracic and lumbar spine.       none

## 2022-09-26 NOTE — ED PROVIDER NOTE - NSTIMEPROVIDERCAREINITIATE_GEN_ER
Called and spoke with patient's wife Ulysses Kappa. Advised that lab order has been faxed with confirmation received. Barbara verbalized understanding.
Faxed lab order to [facility] as pt requested. Confirmation received. Attempt to contact pt to inform it has been faxed. Unsuccessful. LVM to return call.
Patient is at [address] [place] [address]. Fax: [phone]. He was to get labs done/in our system. Patient's at [employer] now.  Patient's phone: [phone]
28-Feb-2022 21:23

## 2022-09-30 NOTE — ED ADULT TRIAGE NOTE - TEMPERATURE IN CELSIUS (DEGREES C)
Eduarda TERRELL handed off report to student nurse, [employer]. Patient is alert and oriented x3. C/o abdomen pain, on a scale of 0-10, pain level is a 7. Pupils are equal, round & react to light. Pupils go from a 5 to 3 mm with consensual response. Upper extremities are warm, dry, and pink. ROM is present. Capillary refill within 3 seconds. Hand grasp is strong and equal. Negative arm drift. Sensation present bilaterally in upper extremities. Skin turgor within 3 seconds. Edema present in hands. Patients heart amplitude is weak. Radial pulse was 93 beats per minute. Lung sounds clear anteriorly and posteriorly. Symmetrical chest movement and moderate depth of respirations. Respirations were 18 breaths per minute. O2 saturation was 93. Very active bowel sounds heard every 5-15 seconds in all four quadrants. Dressing is dry and intact. Abdomen was a little tender in two upper quadrants. No tenderness in two lower quadrants. Little pain when abdomen is palpated. Lower extremity movement present. Edema present in lower extremities. Pedal and push motion strong. Sensation present bilaterally in lower extremities. Bony prominences over coccyx and heels is intact and pink, warm, dry. 36.1

## 2023-01-26 ENCOUNTER — EMERGENCY (EMERGENCY)
Facility: HOSPITAL | Age: 48
LOS: 1 days | Discharge: LEFT BEFORE DISCHARGE | End: 2023-01-26
Admitting: EMERGENCY MEDICINE
Payer: COMMERCIAL

## 2023-01-26 VITALS
SYSTOLIC BLOOD PRESSURE: 179 MMHG | TEMPERATURE: 98 F | HEART RATE: 94 BPM | RESPIRATION RATE: 18 BRPM | OXYGEN SATURATION: 100 % | DIASTOLIC BLOOD PRESSURE: 118 MMHG

## 2023-01-26 PROCEDURE — L9991: CPT

## 2023-01-26 NOTE — ED ADULT TRIAGE NOTE - CHIEF COMPLAINT QUOTE
Pt c/o chest discomfort and palpitations, states had bad heart burn today and took too much Mylanta. Also c/o anxiety for 2x months. Denies SOB, dizziness, headache, blurry vision, nausea, vomiting. PMH GERD, HTN (did not take med today)

## 2023-01-26 NOTE — ED ADULT NURSE NOTE - EXPLANATION OF PATIENT'S REASON FOR LEAVING
Patient states she wants to leave because she is feeling better. states she has an appointment and will follow up with PCP in the morning. patient advised to stay and be seen however patient states she is leaving

## 2023-02-21 NOTE — ED ADULT TRIAGE NOTE - BP NONINVASIVE DIASTOLIC (MM HG)
Medication History completed:    New medications: none    Medications discontinued:   Benzonatate 100 mg (therapy completed)     Medications flagged for review: none    Changes to dosing: none    Stated allergies: NKDA    Other pertinent information: home medications verified with [employer]. Of note, patient initiated 10 day course of ciprofloxacin 500 mg and metronidazole today (2/20/23). Additionally, patient filled norco 5-325 mg quantity of 9 for a 3-day supply today (2/20/23).      Corey Gómez, PharmD candidate [id number]

## 2023-03-09 NOTE — ED PROVIDER NOTE - PATIENT PORTAL LINK FT
Negative
You can access the FollowMyHealth Patient Portal offered by [facility] by registering at the following website: [email or web address]. By joining [employer]’s FollowMyHealth portal, you will also be able to view your health information using other applications (apps) compatible with our system.

## 2023-03-20 ENCOUNTER — EMERGENCY (EMERGENCY)
Facility: HOSPITAL | Age: 48
LOS: 1 days | Discharge: HOME | End: 2023-03-20
Attending: EMERGENCY MEDICINE | Admitting: EMERGENCY MEDICINE
Payer: COMMERCIAL

## 2023-03-20 VITALS
RESPIRATION RATE: 16 BRPM | SYSTOLIC BLOOD PRESSURE: 187 MMHG | HEART RATE: 78 BPM | DIASTOLIC BLOOD PRESSURE: 102 MMHG | OXYGEN SATURATION: 99 %

## 2023-03-20 VITALS
TEMPERATURE: 98 F | SYSTOLIC BLOOD PRESSURE: 188 MMHG | DIASTOLIC BLOOD PRESSURE: 117 MMHG | OXYGEN SATURATION: 100 % | HEART RATE: 94 BPM | RESPIRATION RATE: 17 BRPM

## 2023-03-20 LAB
ALBUMIN SERPL ELPH-MCNC: 3.8 G/DL — SIGNIFICANT CHANGE UP (ref 3.3–5)
ALP SERPL-CCNC: 68 U/L — SIGNIFICANT CHANGE UP (ref 40–120)
ALT FLD-CCNC: 13 U/L — SIGNIFICANT CHANGE UP (ref 4–33)
ANION GAP SERPL CALC-SCNC: 14 MMOL/L — SIGNIFICANT CHANGE UP (ref 7–14)
AST SERPL-CCNC: 22 U/L — SIGNIFICANT CHANGE UP (ref 4–32)
BASOPHILS # BLD AUTO: 0.07 K/UL — SIGNIFICANT CHANGE UP (ref 0–0.2)
BASOPHILS NFR BLD AUTO: 1 % — SIGNIFICANT CHANGE UP (ref 0–2)
BILIRUB SERPL-MCNC: 0.3 MG/DL — SIGNIFICANT CHANGE UP (ref 0.2–1.2)
BUN SERPL-MCNC: 17 MG/DL — SIGNIFICANT CHANGE UP (ref 7–23)
CALCIUM SERPL-MCNC: 8.8 MG/DL — SIGNIFICANT CHANGE UP (ref 8.4–10.5)
CHLORIDE SERPL-SCNC: 102 MMOL/L — SIGNIFICANT CHANGE UP (ref 98–107)
CO2 SERPL-SCNC: 20 MMOL/L — LOW (ref 22–31)
CREAT SERPL-MCNC: 0.75 MG/DL — SIGNIFICANT CHANGE UP (ref 0.5–1.3)
EGFR: 100 ML/MIN/1.73M2 — SIGNIFICANT CHANGE UP
EOSINOPHIL # BLD AUTO: 0.25 K/UL — SIGNIFICANT CHANGE UP (ref 0–0.5)
EOSINOPHIL NFR BLD AUTO: 3.7 % — SIGNIFICANT CHANGE UP (ref 0–6)
FLUAV AG NPH QL: SIGNIFICANT CHANGE UP
FLUBV AG NPH QL: SIGNIFICANT CHANGE UP
GLUCOSE SERPL-MCNC: 97 MG/DL — SIGNIFICANT CHANGE UP (ref 70–99)
HCG SERPL-ACNC: <5 MIU/ML — SIGNIFICANT CHANGE UP
HCT VFR BLD CALC: 36.9 % — SIGNIFICANT CHANGE UP (ref 34.5–45)
HGB BLD-MCNC: 11.7 G/DL — SIGNIFICANT CHANGE UP (ref 11.5–15.5)
IANC: 5.04 K/UL — SIGNIFICANT CHANGE UP (ref 1.8–7.4)
IMM GRANULOCYTES NFR BLD AUTO: 0.3 % — SIGNIFICANT CHANGE UP (ref 0–0.9)
LIDOCAIN IGE QN: 28 U/L — SIGNIFICANT CHANGE UP (ref 7–60)
LYMPHOCYTES # BLD AUTO: 0.76 K/UL — LOW (ref 1–3.3)
LYMPHOCYTES # BLD AUTO: 11.1 % — LOW (ref 13–44)
MCHC RBC-ENTMCNC: 26.7 PG — LOW (ref 27–34)
MCHC RBC-ENTMCNC: 31.7 GM/DL — LOW (ref 32–36)
MCV RBC AUTO: 84.1 FL — SIGNIFICANT CHANGE UP (ref 80–100)
MONOCYTES # BLD AUTO: 0.7 K/UL — SIGNIFICANT CHANGE UP (ref 0–0.9)
MONOCYTES NFR BLD AUTO: 10.2 % — SIGNIFICANT CHANGE UP (ref 2–14)
NEUTROPHILS # BLD AUTO: 5.04 K/UL — SIGNIFICANT CHANGE UP (ref 1.8–7.4)
NEUTROPHILS NFR BLD AUTO: 73.7 % — SIGNIFICANT CHANGE UP (ref 43–77)
NRBC # BLD: 0 /100 WBCS — SIGNIFICANT CHANGE UP (ref 0–0)
NRBC # FLD: 0 K/UL — SIGNIFICANT CHANGE UP (ref 0–0)
PLATELET # BLD AUTO: 351 K/UL — SIGNIFICANT CHANGE UP (ref 150–400)
POTASSIUM SERPL-MCNC: 3.8 MMOL/L — SIGNIFICANT CHANGE UP (ref 3.5–5.3)
POTASSIUM SERPL-SCNC: 3.8 MMOL/L — SIGNIFICANT CHANGE UP (ref 3.5–5.3)
PROT SERPL-MCNC: 7 G/DL — SIGNIFICANT CHANGE UP (ref 6–8.3)
RBC # BLD: 4.39 M/UL — SIGNIFICANT CHANGE UP (ref 3.8–5.2)
RBC # FLD: 12.5 % — SIGNIFICANT CHANGE UP (ref 10.3–14.5)
RSV RNA NPH QL NAA+NON-PROBE: SIGNIFICANT CHANGE UP
SARS-COV-2 RNA SPEC QL NAA+PROBE: SIGNIFICANT CHANGE UP
SODIUM SERPL-SCNC: 136 MMOL/L — SIGNIFICANT CHANGE UP (ref 135–145)
TROPONIN T, HIGH SENSITIVITY RESULT: <6 NG/L — SIGNIFICANT CHANGE UP
TROPONIN T, HIGH SENSITIVITY RESULT: <6 NG/L — SIGNIFICANT CHANGE UP
WBC # BLD: 6.84 K/UL — SIGNIFICANT CHANGE UP (ref 3.8–10.5)
WBC # FLD AUTO: 6.84 K/UL — SIGNIFICANT CHANGE UP (ref 3.8–10.5)

## 2023-03-20 PROCEDURE — 71046 X-RAY EXAM CHEST 2 VIEWS: CPT | Mod: 26

## 2023-03-20 PROCEDURE — 99285 EMERGENCY DEPT VISIT HI MDM: CPT

## 2023-03-20 RX ORDER — AMLODIPINE BESYLATE 2.5 MG/1
5 TABLET ORAL ONCE
Refills: 0 | Status: COMPLETED | OUTPATIENT
Start: 2023-03-20 | End: 2023-03-20

## 2023-03-20 RX ORDER — LOSARTAN POTASSIUM 100 MG/1
50 TABLET, FILM COATED ORAL ONCE
Refills: 0 | Status: COMPLETED | OUTPATIENT
Start: 2023-03-20 | End: 2023-03-20

## 2023-03-20 RX ORDER — SODIUM CHLORIDE 9 MG/ML
1000 INJECTION INTRAMUSCULAR; INTRAVENOUS; SUBCUTANEOUS ONCE
Refills: 0 | Status: COMPLETED | OUTPATIENT
Start: 2023-03-20 | End: 2023-03-20

## 2023-03-20 RX ORDER — FAMOTIDINE 10 MG/ML
20 INJECTION INTRAVENOUS ONCE
Refills: 0 | Status: COMPLETED | OUTPATIENT
Start: 2023-03-20 | End: 2023-03-20

## 2023-03-20 RX ADMIN — Medication 30 MILLILITER(S): at 04:34

## 2023-03-20 RX ADMIN — FAMOTIDINE 20 MILLIGRAM(S): 10 INJECTION INTRAVENOUS at 04:34

## 2023-03-20 RX ADMIN — LOSARTAN POTASSIUM 50 MILLIGRAM(S): 100 TABLET, FILM COATED ORAL at 07:49

## 2023-03-20 RX ADMIN — LOSARTAN POTASSIUM 50 MILLIGRAM(S): 100 TABLET, FILM COATED ORAL at 04:34

## 2023-03-20 RX ADMIN — SODIUM CHLORIDE 1000 MILLILITER(S): 9 INJECTION INTRAMUSCULAR; INTRAVENOUS; SUBCUTANEOUS at 04:34

## 2023-03-20 RX ADMIN — AMLODIPINE BESYLATE 5 MILLIGRAM(S): 2.5 TABLET ORAL at 07:49

## 2023-03-20 NOTE — ED PROVIDER NOTE - DIFFERENTIAL DIAGNOSIS
chest pain: r/o acs (risk factor HTN), GERD, injury post EGD (Sx occurred 1 week out, abdomen benin), Wells negative Differential Diagnosis

## 2023-03-20 NOTE — ED ADULT NURSE REASSESSMENT NOTE - NS ED NURSE REASSESS COMMENT FT1
Patient resting on stretcher, Not in acute distress. alert and oriented x 4. BP remains elevated. Provider made aware. Resps are even and non labored.  Will continue to monitor.
MD made aware of pt's BP, pt denies headache, numbness, tingling, dizziness, chest pain or other complaints. Pt ambulatory to exit at this time.
Pt AxO 4 and ambulatory. Pt is being D/C. Pt states she no longer has any chest pain, and feels much better. Respirations are even and unlabored. Pulses are equal bilaterally, no edema noted. Pt denies chest pain, SOB, N/V/D, difficulty urinating, or passing stool. Pt understood all D/C instructions.

## 2023-03-20 NOTE — ED PROVIDER NOTE - ADDITIONAL NOTES AND INSTRUCTIONS:
To Whom It May Concern,  Indy Kaba was seen and treated within our emergency department today. Please excuse them from all work activities until the date indicated to allow time for adequate recovery. Thank you for your understanding.    Dariusz Montez MD

## 2023-03-20 NOTE — ED PROVIDER NOTE - PHYSICAL EXAMINATION
Gen: WDWN, NAD  HEENT: EOMI, no nasal discharge, mucous membranes moist  CV: RRR, 2+ radial pulses R  Resp: no accessory muscle use, no increased work of breathing  GI: Abdomen soft non-distended, NTTP  MSK: No open wounds, no bruising, no LE edema  Neuro: A&Ox4, following commands, moving all four extremities spontaneously  Psych: appropriate mood

## 2023-03-20 NOTE — ED PROVIDER NOTE - PATIENT PORTAL LINK FT
You can access the FollowMyHealth Patient Portal offered by [facility] by registering at the following website: [email or web address]. By joining [employer]’s FollowMyHealth portal, you will also be able to view your health information using other applications (apps) compatible with our system.

## 2023-03-20 NOTE — ED ADULT TRIAGE NOTE - CHIEF COMPLAINT QUOTE
Pt arrives to ED c/o epigastric pain waking from sleep in the past hour.  Pt took Pepto bismol and the pain lessened.  Hx of GERD.  family hx of cardiac issues.  Pt hypertensive in triage, fell asleep before taking her BP meds.

## 2023-03-20 NOTE — ED PROVIDER NOTE - ATTENDING CONTRIBUTION TO CARE
Afebrile. Awake and Alert. Lungs CTA. Heart RRR. Abdomen soft NTND. CN II-XII grossly intact. Moves all extremities without lateralization. No LE edema or swelling.

## 2023-03-20 NOTE — ED PROVIDER NOTE - NSFOLLOWUPINSTRUCTIONS_ED_ALL_ED_FT
You were seen and evaluated today for chest pain. Our work-up today included blood work, xray and EKG, the results of which have been explained to you and provided to you separately. Please keep a copy of these results to present to doctor in future visits.     - Follow up with your primary care physician and cardiologist within 7-10 days  - Be sure to take all of your normal medications as prescribed    Please return to the Emergency Department should you experience any of the following:  - New or worsening chest pain  - Shortness of breath, Palpitations  - New weakness, fatigue, or fainting  - Fever, unexplained weight loss, night sweats  - Blood in stool or in urine

## 2023-03-20 NOTE — ED PROVIDER NOTE - OBJECTIVE STATEMENT
47-year-old female, past medical history hypertension, GERD, presenting with epigastric pain radiating to the right side of the chest.  Patient woke up at 2 AM with this pain, although decreasing in severity since onset.  Patient endorses feels like similar episodes of GERD.  Pain worse when lying flat.  Endorses prior endoscopy 1 week prior, normal.  Did not have pain after the endoscopy until tonight.  Denies any associated symptoms of shortness of breath, nausea, vomiting, diaphoresis.  Pain onset at rest, not worse on exertion.  Denies prior history of cardiac disease, denies family history, denies smoking history.  Patient arrives hypertensive, states she did not take her home dose of losartan prior to going to bed.

## 2023-03-20 NOTE — ED PROVIDER NOTE - CLINICAL SUMMARY MEDICAL DECISION MAKING FREE TEXT BOX
Martina Lennon MD, PGY-3: 47-year-old female, past medical history hypertension, GERD, presenting with epigastric pain radiating to the right side of the chest. vss, pe no abd ttp. DDx includes atypical ACS, GERD.  Low suspicion for perforated viscus, although possible given recent endoscopy.  Plan for treatment Coenen, chest x-ray, basic labs, GI cocktail.

## 2023-03-20 NOTE — ED PROVIDER NOTE - PROGRESS NOTE DETAILS
Dariusz Montez, PGY-3: Pt signed out to me, I agree with the plan. Pt is currently comfortable and hemodynamically stable. Labs/imaging reviewed. Pt no longer having chest pain. Work up so far has been negative with normal EKG and trop <6. Pt feels improved after GI cocktail, had endoscopy in the past week for severe GERD which was unremarkable. Rpt trop pending, if negative will have pt f/u with her cardiologist. Dariusz Montez, PGY-3: Pt reexamined at bedside, resting comfortably, vitals stable. Rpt trop negative. Will d/c to f/u with PCP and cards. Pt agreeable with plan and will call her cardiologist.

## 2023-03-20 NOTE — ED ADULT NURSE NOTE - TEMPLATE LIST FOR HEAD TO TOE ASSESSMENT
Patient : Ky Reid Age: 44 year old Sex: male   MRN: [id number] Encounter Date: 11/30/2019    History     Chief Complaint   Patient presents with   •  Symptoms   • Altered Mental Status     HPI  11/30/2019  Room: 4/04  8:11 PM Ky Reid is a 44 year old male who presents to the ED for evaluation of difficulties involving his bladder appendiceal stoma. Pt reports that he has been unable to place his catheter d/t it \"kinking\"; pt intermittently self-caths. Pt reports that he has had retention because of this, which has been causing his low back pain. Wife reports that the pt has been confused recently stating that \"I have to tell him which directions to go when driving, and things like that\". Pt goes to dialysis Tues, Thurs, and Sat. Pt did not got today, pt reports that the dialysis is \"stupid\". Associated sx include back pain and penis pain, no sx of fevers, chills, or nausea. There are no further complaints or modifying factors at this time.    I reviewed the patient's medications, allergies, and past medical and surgical history in [facility]. I reviewed ED visit from November 27th for low sodium. It was ntoed that he was having some confusion at that time. Pt had normal head CT. Hx of non-compliance with dialysis. Admitted end of Oct. for missing dialysis and fluid overload.    Telephone Encounter - Patrice Torres MD - 11/07/2019 12:04 PM CST  This patient has a history of epispadias, exstrophy complex and is status post reconstruction ultimately leading to a bladder neck closure with appendiceal based continent urinary stoma. 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 A recent CT scan showed a possible presence of mass within the pouch. I feel this is unlikely but requires evaluation.    Plan: Flexible pouchoscopy under general anesthetic at [facility]  anytime within the next month. 30 minutes. Office to arrange. The risks, benefits and potential complications, the pre-, jose-and post-procedure expectations were discussed with the patient. The patient agrees to proceed     Urologist: Patrice Torres MD  PCP: Edita Estevez    Allergies   Allergen Reactions   • Baclofen Other (See Comments)     unknown   • Bupropion Nausea & Vomiting   • Diltiazem Hcl HIVES     Unknown reaction   • Doxycycline DIARRHEA and Nausea & Vomiting   • Erythromycin Other (See Comments)     unkown   • Hydrocodone Nausea & Vomiting   • Vancomycin PRURITUS   • Ciprofloxacin RASH   • Latex Other (See Comments)     Heightened sensitivity to smell of latex       Current Discharge Medication List      Prior to Admission Medications    Details   CALCITRIOL PO Take by mouth 3 days a week. 1 mcq given at Dialysis Tuesday, Thursday and Saturday      nystatin (MYCOSTATIN) 773744 UNIT/ML suspension Take 500,000 Units by mouth 4 times daily.      doxycycline monohydrate (MONODOX) 100 MG capsule Take 100 mg by mouth 2 times daily.      furosemide (LASIX) 40 MG tablet Take 40 mg by mouth daily.      amitriptyline (ELAVIL) 50 MG tablet Take 0.5 tablets by mouth nightly.  Qty: 60 tablet, Refills: 0      gabapentin (NEURONTIN) 100 MG capsule Take 1 capsule by mouth nightly.  Qty: 30 capsule, Refills: 0      guaiFENesin (MUCINEX) 600 MG 12 hr tablet Take 1 tablet by mouth every 12 hours.  Qty: 20 tablet, Refills: 0      nicotine (NICODERM) 21 MG/24HR patch Place 1 patch onto the skin daily.  Qty: 28 patch, Refills: 0      albuterol 108 (90 Base) MCG/ACT inhaler Inhale 2 puffs into the lungs every 4 hours as needed for Shortness of Breath or Wheezing. Please Rx patient with spacer  Qty: 18 g, Refills: 5      Cholecalciferol (VITAMIN D3) 2000 units Tab Take 2,000 Units by mouth daily.      acetaminophen (TYLENOL 8 HOUR ARTHRITIS PAIN) 650 MG CR tablet Take 650 mg by mouth every 8 hours as needed for Pain.       Iron Sucrose (VENOFER IV) Inject 100 mg into the vein 3 days a week. Given at Dialysis Tuesday, Thursday and Saturday       Methoxy PEG-Epoetin Beta (MIRCERA IJ) Inject 60 mcg into the vein every 14 days. Given at Dialysis every other Tuesday       escitalopram (LEXAPRO) 10 MG tablet Take 10 mg by mouth daily.      esomeprazole (NEXIUM) 40 MG capsule Take 40 mg by mouth 2 times daily (before meals).      sevelamer carbonate (RENVELA) 800 MG tablet Take 800 mg by mouth 2 times daily (before meals).      valACYclovir (VALTREX) 500 MG tablet Take 500 mg by mouth daily.      midodrine (PROAMATINE) 10 MG tablet Take 10 mg by mouth 3 times daily.             Current Discharge Medication List          Past Medical History:   Diagnosis Date   • Anemia    • Anxiety    • Arthritis     hands   • Chronic kidney disease    • Chronic pain     Back, legs   • COPD (chronic obstructive pulmonary disease) (CMS/Formerly [facility])    • Depression    • Failed moderate sedation during procedure     difficult to wake up after sedation/surgery 2019 [facility][note 3]   • Failed moderate sedation during procedure 10/07/2019    for all [facility] IR procedures use mac, even permcath placement   • Gastroesophageal reflux disease    • MRSA (methicillin resistant Staphylococcus aureus)    • Osteoporosis     bone biopsy   • Otitis media    • Urinary tract infection      History reviewed. No pertinent past surgical history.    Family History   Problem Relation Age of Onset   • Diabetes Father        Social History     Tobacco Use   • Smoking status: Current Every Day Smoker     Packs/day: 2.00     Types: Cigarettes   • Smokeless tobacco: Never Used   • Tobacco comment: refused   Substance Use Topics   • Alcohol use: Never     Frequency: Never   • Drug use: Never       Review of Systems   Constitutional: Negative for activity change, appetite change, chills and fever.   HENT: Negative for congestion, ear pain, rhinorrhea and sore throat.    Eyes: Negative for pain.    Respiratory: Negative for shortness of breath and wheezing.    Cardiovascular: Negative for chest pain and leg swelling.   Gastrointestinal: Negative for abdominal distention, abdominal pain, blood in stool, constipation, diarrhea, nausea and vomiting.        Positive for bladder appendiceal stoma difficulties   Genitourinary: Positive for decreased urine volume (d/t inability to palce catheter). Negative for difficulty urinating, dysuria and frequency.   Musculoskeletal: Positive for back pain (low). Negative for neck pain.   Skin: Negative for rash.   Neurological: Negative for dizziness, syncope, numbness and headaches.   Psychiatric/Behavioral: Negative.        Physical Exam     ED Triage Vitals   ED Triage Vitals Group      Temp 11/30/19 1953 97.6 °F (36.4 °C)      Pulse 11/30/19 1953 85      Resp 11/30/19 1953 16      BP 11/30/19 1948 138/82      SpO2 11/30/19 1948 95 %      EtCO2 mmHg --       Height 11/30/19 1953 5' 7\" (1.702 m)      Weight 11/30/19 1953 151 lb 0.2 oz (68.5 kg)      Weight Scale Used 11/30/19 1953 ED Stated      BMI (Calculated) 11/30/19 1953 23.65      IBW/kg (Calculated) 11/30/19 1953 66.1       Physical Exam   Constitutional: He is oriented to person, place, and time.   Verbally aggressive and frustrated.  Older than stated age.  chronically ill appearing    HENT:   Head: Normocephalic and atraumatic.   Eyes: Pupils are equal, round, and reactive to light. Conjunctivae and EOM are normal.   Neck: Normal range of motion. Neck supple.   Cardiovascular: Normal rate, regular rhythm, normal heart sounds and intact distal pulses.   No murmur heard.  Pulmonary/Chest: Effort normal and breath sounds normal. No respiratory distress. He has no wheezes. He has no rales.   Dialysis catheter R upper chest wall   Abdominal: Soft. Bowel sounds are normal. He exhibits distension (mild). There is no tenderness. There is no rebound.   Bladder appendiceal stoma R lower abd w/o drainage or redness    Genitourinary:    Genitourinary Comments: No redness or swelling to penis or scrotum.  Scar tissue chronic appearing to penile shaft.   Musculoskeletal:         General: No edema.     Neurological: He is alert and oriented to person, place, and time.   Skin: Skin is warm. No rash noted. He is not diaphoretic.   Psychiatric: He has a normal mood and affect.   Nursing note and vitals reviewed.      ED Course     Procedures    Lab Results     Results for orders placed or performed during the hospital encounter of 11/30/19   CBC with Automated Differential   Result Value Ref Range    WBC 8.5 4.2 - 11.0 K/mcL    RBC 2.62 (L) 4.50 - 5.90 mil/mcL    HGB 8.5 (L) 13.0 - 17.0 g/dL    HCT 26.0 (L) 39.0 - 51.0 %    MCV 99.2 78.0 - 100.0 fl    MCH 32.4 26.0 - 34.0 pg    MCHC 32.7 32.0 - 36.5 g/dL    RDW-CV 16.0 (H) 11.0 - 15.0 %     (L) 140 - 450 K/mcL    NRBC 0 0 /100 WBC    DIFF TYPE AUTOMATED DIFFERENTIAL     Neutrophil 69 %    LYMPH 20 %    MONO 8 %    EOSIN 1 %    BASO 1 %    Percent Immature Granuloctyes 1 %    Absolute Neutrophil 5.8 1.8 - 7.7 K/mcL    Absolute Lymph 1.7 1.0 - 4.8 K/mcL    Absolute Mono 0.7 0.3 - 0.9 K/mcL    Absolute Eos 0.1 0.1 - 0.5 K/mcL    Absolute Baso 0.1 0.0 - 0.3 K/mcL    Absolute Immature Granulocytes 0.1 0 - 0.2 K/mcl   COMPREHENSIVE METABOLIC PANEL   Result Value Ref Range    Sodium 131 (L) 135 - 145 mmol/L    Potassium 4.4 3.4 - 5.1 mmol/L    Chloride 97 (L) 98 - 107 mmol/L    Carbon Dioxide 24 21 - 32 mmol/L    Anion Gap 14 10 - 20 mmol/L    Glucose 89 65 - 99 mg/dL    BUN 30 (H) 6 - 20 mg/dL    Creatinine 8.22 (H) 0.67 - 1.17 mg/dL    GFR Estimate,  8     GFR Estimate, Non African American 7     BUN/Creatinine Ratio 4 (L) 7 - 25    CALCIUM 9.7 8.4 - 10.2 mg/dL    TOTAL BILIRUBIN 0.5 0.2 - 1.0 mg/dL    AST/SGOT 12 <38 Units/L    ALT/SGPT <6 <64 Units/L    ALK PHOSPHATASE 238 (H) 45 - 117 Units/L    TOTAL PROTEIN 7.0 6.4 - 8.2 g/dL    Albumin 2.4 (L) 3.6 - 5.1 g/dL     GLOBULIN 4.6 (H) 2.0 - 4.0 g/dL    A/G Ratio, Serum 0.5 (L) 1.0 - 2.4   Chem 8 Panel - Point of Care   Result Value Ref Range    Sodium  (L) 135 - 145 mmol/L    Potassium POC 4.4 3.4 - 5.1 mmol/L    Chloride POC 96 (L) 98 - 107 mmol/L    CALCIUM IONIZED-POC 1.24 1.15 - 1.29 mmol/L    CO2 Total 24 19 - 24 mmol/L    GLUCOSE POC 88 70 - 99 mg/dL    BUN POC 25 (H) 6 - 20 mg/dL    HEMATOCRIT POC 28.0 (L) 39.0 - 51.0 %    Hemoglobin POC 9.5 (L) 13.0 - 17.0 g/dL    ANION GAP POC 16 mmol/L    Creatinine POC 8.90 (H) 0.67 - 1.17 mg/dL    Estimated GFR  (POC) 8     Estimated GFR Non-African American (POC) 7    Ammonia   Result Value Ref Range    AMMONIA 30 <34 mcmol/L       EKG Results     EKG Interpretation  Rate: 90  Rhythm: normal sinus rhythm   Abnormality: Nonspecific T wave abnormality. Prolonged QT. When compared with ECG of 22-NOV-2019, no significant change was found.    EKG tracing interpreted by ED physician    Radiology Results     Imaging Results          CT ABDOMEN PELVIS WO CONTRAST (Final result)  Result time 11/30/19 22:38:16    Final result                 Impression:    IMPRESSION:    1.  Severe renal parenchymal atrophy bilaterally with marked hydronephrosis  and hydroureter, slightly greater on the right.  2.  Right bladder appendiceal stoma contains a ovoid hyperattenuating 11 x  6 mm focus within the appendiceal tract just lateral to the bladder, of  uncertain etiology but possibly the cause of obstruction/difficulty  catheterizing the bladder.  3.  Ill-defined slightly hyperattenuating 6.2 x 3.5 x 4.0 cm focus right  bladder wall possibly a bladder wall mass. A similar finding was described  on the outside CT urogram 9/23/2019. Further evaluation with cystoscopy is  recommended if not previously performed.  4.  Slight amount of hyperattenuating dependent stones or sludge in the  gallbladder. No acute cholecystitis.               Narrative:    CT ABDOMEN PELVIS WO  CONTRAST    HISTORY: unable to insert catheter into RLQ appendecial stome, rlq pain,  distension, dialysis patient,    COMPARISON: Ultrasound 10/8/2019 and 127-2011. Report from outside CT  urogram 9/23/2019, images not available for comparison.    TECHNIQUE:  Axial images through the abdomen and pelvis. Sagittal and coronal  reformats.    IV contrast: None.   Oral contrast:  None.    FINDINGS, ABDOMEN/PELVIS:  Examination of the visceral organs is limited by the lack of oral or IV  contrast.    LUNG BASES: Mild dependent atelectasis bilateral lower lobes. Trace  bilateral pleural effusions.    LIVER: Unremarkable.  GALLBLADDER: Slight amount of hyperattenuating dependent stones or sludge.  No acute cholecystitis.  SPLEEN: Unremarkable.  PANCREAS: Mild fatty atrophy.  ADRENAL GLANDS: Unremarkable.    KIDNEYS/BLADDER:   Severe renal parenchymal atrophy bilaterally with marked hydronephrosis and  hydroureter, slightly greater on the right.    Right bladder appendiceal stoma contains a ovoid hyperattenuating 11 x 6 mm  focus within the tract just lateral to the bladder (2/108 and 5/47), of  uncertain etiology.    Ill-defined slightly hyperattenuating 6.2 x 3.5 x 4.0 cm focus right  bladder wall (2/116 and 6/94) possibly a bladder wall mass. Mild fat  stranding pelvis/bladder compatible with history of prior suprapubic  catheter.    PELVIS: No pelvic free fluid.        COLON: Unremarkable.  SMALL BOWEL: Unremarkable.  STOMACH: Unremarkable    VASCULAR: Mild atherosclerotic disease, no abdominal aortic aneurysm.  LYMPH NODES/MESENTERY: Unremarkable.  SOFT TISSUES: Unremarkable    OSSEOUS: Arthritic change with subchondral cysts or erosions bilateral  sacroiliac joints, probably related to chronic sacroiliitis.                                 XR Chest AP or PA (Final result)  Result time 11/30/19 20:58:55    Final result                 Impression:    IMPRESSION: No acute cardiopulmonary findings.               Narrative:     Chest, one view    CLINICAL HISTORY: Urinary tract infection, altered mental status and missed  dialysis    COMPARISON: Chest, one view 10/31/2019    FINDINGS: Portable upright frontal chest radiograph shows no new  cardiopulmonary concerns. The lungs and pleural spaces are clear. The  heart, pulmonary vessels and mediastinal contours remain normal. Stable  satisfactory position of the right-sided double-lumen dialysis catheter.                                ED Medication Orders (From admission, onward)    Ordered Start     Status Ordering Provider    11/30/19 2055 11/30/19 2056  acetaminophen (TYLENOL) tablet 1,000 mg  ONCE      Last MAR action:  Given CHIKI GAYTAN    11/30/19 2044 11/30/19 2130  midodrine (PROAMATINE) tablet 10 mg  ONCE      Acknowledged CHIKI GAYTAN             [facility]  Vitals  Vitals:    11/30/19 2030 11/30/19 2100 11/30/19 2130 11/30/19 2200   BP: 145/87 140/84 129/87 130/75   Pulse: 90 90 86 88   Resp: 24 21 19 22   Temp:       TempSrc:       SpO2:       Weight:       Height:           ED Course    Patient evaluated at bedside in room. VSS. Plan for obtaining a basic laboratory workup, CXR, and UA. Pt to receive Tylenol within the ED.     9:32 PM: DORA informed me that she has sent out the 3rd page for Urology w/o response.    9:49 PM Reassessment: Pt status rechecked; pt is asleep upon my arrival. I informed the wife that I have been unable to contact Urology so far. We discussed continued treatment plan; pt suspect to continue care beyond the ED for catheter placement. I suspect that the confusion is secondary to his lack of compliance with dialysis. All questions were addressed and answered to the wife's understanding at this point in time.    9:55 PM Consult: I spoke with Pelon Taveras MD (Nephrologist) regarding the patient's presentation and the Emergency department work up.  Dr. Taveras knows the pt very well, and we discussed his normal potassium and his stable, but low chronic sodium.  Discussed likely mild uremia as cause for mild confusion, which the pt has had in the past. There are no clear signs or symptoms for infection. Discussed plan for consultation with Urology. Dr. Taveras will arrange for dialysis.    10:03 PM Consult: I spoke with Kostas Perez MD regarding the patient's presentation, urological hx, and the Emergency department work up.  We further discussed and collaboratively agreed on attempt to place 14 Fr silicon catheter within ED, rather than admission. If unable, it is fine to defer until tomorrow to place with camera guided assistance. There are no indications for emergent catheter placement.    10:36 PM: Pt rechecked. Pt attempted self-catheterization but was unable to. Pt informed on CT results indicating an obstruction of the bladder. I am going to consult with Urology; however, I suspect that the pt will continue care beyond ED.    10:59 PM: Consult: I spoke Kostas Perez MD ([place]) and udpated him that the pt was inable to self calth. We discussed CT findings. There is no need for emergent self catheterization, he will see the pt in the morning. Pt is going to continue care beyond the ED until then.    11:08 PM: Pt updated on plan of care for evaluation of the obstruction through camera guided assistance. The pt was explained in detail on the plan of care. All questions were addressed and answered to the pt's full understanding.    11:11 PM Hospitalist Admission: I spoke with Oli Foster MD (Hospitalist) regarding continuing pt care beyond the emergency department. Dr. Foster and DENIS discussed in detail the pt's presentation to the emergency department, along with the results of the ED workup and current pt status. Dr. Foster and I collaboratively agree on the plan of care, and he will endorse pt care. Pt to continue further care beyond the ED until urology follow up tomorrow.    MDM  Does not seem significantly confused, and is actually angry and agitated at  times.  Possibly mildly encephalopathic from missed dialysis of which he has had in the past.      Concern for unable to cath, possible closing of stoma / obstruction.  Na levels stable.  K is OK.  No signs or symptoms of infection at this point.    Will dialyze tomorrow per nephrology.  Urology to see in AM to place catheter.  No indication for emergent placement per my discussion with urology and review of CT images and clinical picture.        Critical Care time spent on this patient outside of billable procedures:  None    10:06 PM Does this patient meet Severe Sepsis criteria by CMS SEP-1 definition? No    10:06 PM Does this patient meet Septic Shock criteria by CMS SEP-1 definition?No      Clinical Impression:  ED Diagnoses        Final diagnoses    Chronic hyponatremia          Encephalopathy          ESRD (end stage renal disease) (CMS/[facility])          Urinary tract obstruction                    Pt to be admitted under the care of Dr. Oli Foster MD in serious condition.     ____________________________________________________________________________________________    I have reviewed the information recorded by the scribe for accuracy and agree with its contents.      Parish Zayas acting as a scribe for MD Favio Watts MD  Dictation # [id number]            Favio Martinez MD  11/30/19 2351       Favio Martinez MD  12/01/19 0002     General

## 2023-03-20 NOTE — ED ADULT NURSE NOTE - OBJECTIVE STATEMENT
Received the patient in spot 23 A with c/o epigastric pain . patient states she woke from sleep with pain. Not in acute distress. alert and oriented x 4, safety maintained. 20G IV line inserted in right AC. due labs sent. Meds given as per order. Will continue to monitor.

## 2023-03-20 NOTE — ED ADULT NURSE NOTE - IN THE PAST 12 MONTHS HAVE YOU USED DRUGS OTHER THAN THOSE REQUIRED FOR MEDICAL REASON?
ELECTROPHYSIOLOGY      Patient s/p ILR implantation.  tolerated the procedure well. No complications.   Post procedure ILR teaching done. Written instructions and contact information provided.   Home monitor given to patient with verbal and written instructions.   Our secretaries will call him next Monday to make a follow-up appointment.
Pt is s/p Loop recorder placement 12/13  Lying in bed, no complaints    ICU Vital Signs Last 24 Hrs  T(C): 36.9 (13 Dec 2019 21:22), Max: 37.1 (13 Dec 2019 06:12)  T(F): 98.5 (13 Dec 2019 21:22), Max: 98.7 (13 Dec 2019 06:12)  HR: 72 (13 Dec 2019 21:22) (57 - 77)  BP: 111/78 (13 Dec 2019 21:22) (98/68 - 118/52)  BP(mean): --  ABP: --  ABP(mean): --  RR: 18 (13 Dec 2019 21:22) (16 - 18)  SpO2: 100% (13 Dec 2019 21:22) (100% - 100%)    ILR site with clean, dry bandage, no swelling or tenderness    A/P:  Stable
Type of Procedure: ILR implant  Licensed independent practitioner: Oliver Cantu MD  Assistant: None  Description of procedure:   Written informed consent was obtained from the patient after a full explanation of the risks and benefits of  the procedure. The patient was brought to the lab in the fasting state. Continuous electrocardiographic and  hemodynamic monitoring was initiated. The patient was prepped and draped in the usual sterile fashion. Ancef was given within 30 mins of preocedure time.  Local anesthetic was delivered and a 1 cm diagonal incision was made just lateral to the sternum using the  incision tool supplied by the . Using the insertion tool, a subcutaneous tunnel was created  approximately 8 mm under the skin. The insertion tool was then rotated 180 degrees to create a pocket for the  device, and the preloaded device was then inserted into the pocket. The device was held in place while the  insertion tool was removed. Hemostasis was achieved with manual pressure. Dermabond was then placed  over the incision. The wound was covered with a dry sterile dressing. The procedure was well  tolerated and the patient left the laboratory alert and in good condition. Patient education regarding device  triggering was performed prior to discharge.  Programming was as follows:    - Tachycardia: 171 bpm), 16 beats duration  - Bradycardia: >2,000ms (  - Pause: >3 seconds  - AF detection: ON  Findings of procedure: As above  Estimated blood loss: 0cc  Specimen removed: N/A  Preoperative Dx: Sushil  Postoperative Dx: Sushil  Complications: None  Anesthesia type: Local
No

## 2023-04-26 NOTE — ED POST DISCHARGE NOTE - REASON FOR FOLLOW-UP
Patient called asking if thyroid testing done on last ED visit. No thyroid values. Discussed with patient to follow up with Dr Centeno. Discussed with patient need to return to ED if symptoms don't continue to improve or recur or develops any new or worsening symptoms that are of concern. Other

## 2023-04-28 NOTE — ED ADULT NURSE NOTE - SUICIDE SCREENING DEPRESSION
Called [employer] [facility] ([phone]) to check on status of authorization  Spoke to Daxa who stated clinicals were received and just waiting on determination   notified  Negative

## 2023-05-08 NOTE — ED PROVIDER NOTE - WORK/EXCUSE FORM DATE
[address] Emergency Department  [id number] [facility] [address] [facility] [address]  Phone: [phone]  Fax: [phone]        Pt Name: Hortensia Bush  MRN: [id number]  [place] 1950  Date of evaluation: 5/8/23      CHIEF COMPLAINT     Chief Complaint   Patient presents with    Headache     Pt has cluster headaches and is seen by [address]  (Location/Symptom, Timing/Onset, Context/Setting, Quality, Duration, Modifying Factors, Severity.)    Hortensia Bush is a 81 y.o. male who presents with left-sided headache. He has a history of cluster headaches and he states this feels exactly like his last year cluster headache. He tried oxygen at home but did not improve. He was here last month and given Decadron Toradol which she states took his pain away. He denies any falls or trauma. No anticoagulation use. There is no numbness tingling double vision or stroke symptoms. He states he will go multiple years without headaches but the headaches seem to come on randomly. REVIEW OF SYSTEMS    (2-9 systems for level 4, 10 or more for level 5)     Review of Systems   Constitutional:  Negative for chills and fever. HENT:  Negative for congestion, rhinorrhea and sore throat. Eyes:  Negative for pain. Respiratory:  Negative for cough, chest tightness, shortness of breath and wheezing. Cardiovascular:  Negative for chest pain. Gastrointestinal:  Negative for abdominal distention, nausea and vomiting. Genitourinary:  Negative for difficulty urinating and dysuria. Skin:  Negative for rash. Neurological:  Positive for headaches. Negative for dizziness. PAST MEDICAL HISTORY    has a past medical history of Bladder cancer ([address] BPH (benign prostatic hyperplasia), and Hyperlipidemia. SURGICAL HISTORY      has a past surgical history that includes Tonsillectomy;  Nose surgery; and cyst removal.    CURRENTMEDICATIONS       Discharge Medication
02-Mar-2022

## 2023-06-02 ENCOUNTER — EMERGENCY (EMERGENCY)
Facility: HOSPITAL | Age: 48
LOS: 1 days | Discharge: HOME | End: 2023-06-02
Attending: STUDENT IN AN ORGANIZED HEALTH CARE EDUCATION/TRAINING PROGRAM
Payer: COMMERCIAL

## 2023-06-02 VITALS
DIASTOLIC BLOOD PRESSURE: 110 MMHG | SYSTOLIC BLOOD PRESSURE: 164 MMHG | OXYGEN SATURATION: 96 % | HEART RATE: 96 BPM | RESPIRATION RATE: 20 BRPM | HEIGHT: 65 IN | TEMPERATURE: 99 F | WEIGHT: 169.98 LBS

## 2023-06-02 PROCEDURE — 71046 X-RAY EXAM CHEST 2 VIEWS: CPT | Mod: 26

## 2023-06-02 PROCEDURE — 71046 X-RAY EXAM CHEST 2 VIEWS: CPT

## 2023-06-02 PROCEDURE — 99283 EMERGENCY DEPT VISIT LOW MDM: CPT | Mod: 25

## 2023-06-02 PROCEDURE — 99284 EMERGENCY DEPT VISIT MOD MDM: CPT

## 2023-06-02 PROCEDURE — 93005 ELECTROCARDIOGRAM TRACING: CPT

## 2023-06-02 NOTE — ED PROVIDER NOTE - OBJECTIVE STATEMENT
47 F w/ hx of HTN presents to the ER s/p visiting UC yesterday for ear ache, pt states that she was given azithromycin, and albuterol. Pt w/ mild central cp, w/ coughing and worse w/ palpation of the anterior chest wall, pt w/ no associated CP w/ exertion, no radiation of pain down the arm, pt w/ no lightheadedness nor dizziness. Pt states she was dx w/ ear infection. pt states I cam e tot he ER for eval for possible PNA and wanted a chest xray

## 2023-06-02 NOTE — ED ADULT NURSE NOTE - NS ED NURSE DC INFO COMPLEXITY
Right middle finger tip swollen and painful with movement.  Patient's name and date of birth checked and is correct.    LOC: The patient is awake, alert, and oriented to place, time, situation. Affect is appropriate.  Speech is appropriate and clear.      APPEARANCE: Patient resting comfortably, and is  in no acute distress.  Patient is clean and well groomed.     SKIN: The skin is warm and dry; color consistent with ethnicity.  Patient has normal skin turgor and moist mucus membranes.  Skin intact; no breakdown or bruising noted.      MUSCULOSKELETAL: Patient moving upper and lower extremities without difficulty.  Denies weakness.      RESPIRATORY: Airway is open and patent. Respirations spontaneous, even, easy, and non-labored.  Patient has a normal effort and rate.  No accessory muscle use noted. Denies cough.  BS clear.     CARDIAC:  No peripheral edema noted. No complaints of chest pain.       ABDOMEN: Soft and non tender to palpation.  No distention noted.      NEUROLOGIC: Eyes open spontaneously.  Behavior appropriate to situation.  Follows commands; facial expression symmetrical.  Purposeful motor response noted; normal sensation in all extremities.     Simple: Patient demonstrates quick and easy understanding/Straightforward: Basic instructions, no meds, no home treatment

## 2023-06-02 NOTE — ED ADULT NURSE NOTE - NSFALLRISKASMT_ED_ALL_ED_DT
He has had 2 seizures in past two hours, Pt. is on concerta & keppra, pt. took tonights dose, mom states they lasted 3 min, 3 hours apart and self resolved, mom states they are grand mal, currently alert & active 02-Jun-2023 10:57

## 2023-06-02 NOTE — ED PROVIDER NOTE - CLINICAL SUMMARY MEDICAL DECISION MAKING FREE TEXT BOX
47 F w/ hx of HTN on losartan here w/ cough that is productive started yesterday pt reports went to UC and then was dx w/ ear infection, bronchitis pt concerned she has PNA and wants an xray which prompted er visit. On exam, pt is awake and alert w/ clear lungs no signs of respiratory distress, nonlabored, plan for xray ekg, and likely outpt follow up

## 2023-06-02 NOTE — ED PROVIDER NOTE - PROGRESS NOTE DETAILS
ap- pt counselled on test results, will continue home meds, stable for DC xray w/ no evidence of pna, pt to follow up w/ PCP for elevated BP

## 2023-06-02 NOTE — ED PROVIDER NOTE - NS ED ROS FT
Constitutional: no fevers no chills.   CV: no chest pain, no palpitations   Respiratory: no shortness of breath, + cough   GI: no abdominal pain, no nausea no vomiting   Neuro: no headache, no weakness, no numbness

## 2023-06-02 NOTE — ED PROVIDER NOTE - PATIENT PORTAL LINK FT
You can access the FollowMyHealth Patient Portal offered by [facility] by registering at the following website: [email or web address]. By joining "[employer]’s FollowMyHealth portal, you will also be able to view your health information using other applications (apps) compatible with our system.

## 2023-06-02 NOTE — ED PROVIDER NOTE - PHYSICAL EXAMINATION
IConst: no acute distress, Well-developed, Eyes: no conjunctival injection and no scleral icterus ENMT: Moist mucus membranes, CVS: +S1/S2, radial 2+ pulse, anterior chest wall w / cp that is reproducible w/ palpation  RESP: Unlabored respiratory effort, Clear to auscultation bilaterally GI: Nontender/Nondistended soft abdomen, no CVA tenderness MSK: Extremities w/o deformity or ttp, Psych: Awake, Alert, & Orientedx3;  Appropriate mood and affect, cooperative

## 2023-06-02 NOTE — ED PROVIDER NOTE - NSFOLLOWUPINSTRUCTIONS_ED_ALL_ED_FT
-- Please follow up with your doctor(s) within 3 days, but seek care sooner if your symptoms are worsening.  -- take your medications from urgent care as directed.   -- Rest, increase your fluid intake and avoid dehydration & alcohol.   -- It is very important to be diligent about hand washing & hygiene to avoid spreading the illness to others.  -- You were given a copy of the results from any tests performed today in the Emergency Department which have results available.  Show these to your doctor(s).   Some of the tests we sent may not have results yet so please call or have your doctor call the Emergency Department to follow up on all results.  -- If you are having any worsening or continued fever, chills, weakness, nausea, vomiting, abdominal pain, please see your doctor or return to the Emergency Department.  -- Please continue taking your home medications as directed.  Do not use alcohol when taking any medication (especially antibiotics, tylenol or other pain medication) unless you check with the doctor or pharmacist. -- Please follow up with your doctor(s) within 3 days, (for blood pressure check and to ensure your symptoms are improving) but seek care sooner if your symptoms are worsening.  -- take your medications from urgent care as directed.   -- Rest, increase your fluid intake and avoid dehydration & alcohol.   -- It is very important to be diligent about hand washing & hygiene to avoid spreading the illness to others.  -- You were given a copy of the results from any tests performed today in the Emergency Department which have results available.  Show these to your doctor(s).   Some of the tests we sent may not have results yet so please call or have your doctor call the Emergency Department to follow up on all results.  -- If you are having any worsening or continued fever, chills, weakness, nausea, vomiting, abdominal pain, please see your doctor or return to the Emergency Department.  -- Please continue taking your home medications as directed.  Do not use alcohol when taking any medication (especially antibiotics, tylenol or other pain medication) unless you check with the doctor or pharmacist.

## 2023-06-06 ENCOUNTER — APPOINTMENT (OUTPATIENT)
Dept: RADIOLOGY | Facility: HOSPITAL | Age: 48
End: 2023-06-06
Payer: COMMERCIAL

## 2023-06-06 ENCOUNTER — OUTPATIENT (OUTPATIENT)
Dept: OUTPATIENT SERVICES | Facility: HOSPITAL | Age: 48
LOS: 1 days | End: 2023-06-06

## 2023-06-06 DIAGNOSIS — E66.9 OBESITY, UNSPECIFIED: ICD-10-CM

## 2023-06-06 DIAGNOSIS — K76.0 FATTY (CHANGE OF) LIVER, NOT ELSEWHERE CLASSIFIED: ICD-10-CM

## 2023-06-06 PROCEDURE — 74240 X-RAY XM UPR GI TRC 1CNTRST: CPT | Mod: 26

## 2023-07-10 ENCOUNTER — APPOINTMENT (OUTPATIENT)
Dept: RADIOLOGY | Facility: HOSPITAL | Age: 48
End: 2023-07-10

## 2023-11-10 NOTE — ED PROVIDER NOTE - NS ED MD DISPO DISCHARGE CCDA
Vaginal Delivery Procedure Note     Delivery provider: Fernando Zhou MD; [facility]     Delivery date/time: 11/10/2023, 13:41     Total time in 2nd stage: ~5min  Total time in 3rd stage: ~5min     Labor Course: Neda Figueroa is a 31 year old  at 39w3d who presented in spontaneous labor and progressed to complete after AROM.     Anesthesia: Epidural     EBL: 350mL, initially brisk with poor uterine tone but resolved w/ Pit wide open, Methergine, TXA also infusing     Specimens: cord blood     Findings: VMI (deciding on a name), cephalic presentation, 3VC, no nuchal cord, Apgars pending per RN but suspect will be 9/9, 2nd degree perineal laceration and multiple labial abrasions as well as hemostatic right periurethral lac and left periclitoral lac. Baby 8lb 3oz     Fetal tracing Cat 1 throughout labor and Cat 1-2 during pushing efforts to delivery     Delivery details:  At bedside. Patient complete and pushing. With excellent maternal expulsive efforts, the infant's head delivered. Examined for nuchal cord and none noted. Shoulders followed without force or delay. Delivered VMI to mother's abdomen. Cord clamped and cut after 60 seconds; 3VC. Cord gases collected and sent.  Placenta delivered via gentle traction and fundal massage. Uterus explored and cleared of all clot and debris and tone poor with brisk bleeding so Pit taken off pump and moved to wide open while massage being performed. Methergine given and TXA hung and fundus firmed appropriately. Perineum examined and 2nd degree laceration noted as well as small left periclitoral and right periurethral lacerations and labial abrasions. The second degree laceration was repaired in standard fashion, the other lacerations were small and hemostatic and left alone. EBL 350cc. Apgars per RN/NICU. Vaginal counts correct. Fundus firm at firm with scant/normal flow after.    Patient/Caregiver provided printed discharge information.

## 2023-11-13 ENCOUNTER — EMERGENCY (EMERGENCY)
Facility: HOSPITAL | Age: 48
LOS: 1 days | Discharge: HOME | End: 2023-11-13
Attending: EMERGENCY MEDICINE | Admitting: EMERGENCY MEDICINE
Payer: COMMERCIAL

## 2023-11-13 VITALS
HEART RATE: 100 BPM | RESPIRATION RATE: 18 BRPM | SYSTOLIC BLOOD PRESSURE: 188 MMHG | DIASTOLIC BLOOD PRESSURE: 114 MMHG | OXYGEN SATURATION: 100 % | TEMPERATURE: 98 F

## 2023-11-13 LAB
HCT VFR BLD CALC: 38.9 % — SIGNIFICANT CHANGE UP (ref 34.5–45)
HCT VFR BLD CALC: 38.9 % — SIGNIFICANT CHANGE UP (ref 34.5–45)
HGB BLD-MCNC: 12.8 G/DL — SIGNIFICANT CHANGE UP (ref 11.5–15.5)
HGB BLD-MCNC: 12.8 G/DL — SIGNIFICANT CHANGE UP (ref 11.5–15.5)
MCHC RBC-ENTMCNC: 28.1 PG — SIGNIFICANT CHANGE UP (ref 27–34)
MCHC RBC-ENTMCNC: 28.1 PG — SIGNIFICANT CHANGE UP (ref 27–34)
MCHC RBC-ENTMCNC: 32.9 GM/DL — SIGNIFICANT CHANGE UP (ref 32–36)
MCHC RBC-ENTMCNC: 32.9 GM/DL — SIGNIFICANT CHANGE UP (ref 32–36)
MCV RBC AUTO: 85.3 FL — SIGNIFICANT CHANGE UP (ref 80–100)
MCV RBC AUTO: 85.3 FL — SIGNIFICANT CHANGE UP (ref 80–100)
NRBC # BLD: 0 /100 WBCS — SIGNIFICANT CHANGE UP (ref 0–0)
NRBC # BLD: 0 /100 WBCS — SIGNIFICANT CHANGE UP (ref 0–0)
NRBC # FLD: 0 K/UL — SIGNIFICANT CHANGE UP (ref 0–0)
NRBC # FLD: 0 K/UL — SIGNIFICANT CHANGE UP (ref 0–0)
PLATELET # BLD AUTO: 396 K/UL — SIGNIFICANT CHANGE UP (ref 150–400)
PLATELET # BLD AUTO: 396 K/UL — SIGNIFICANT CHANGE UP (ref 150–400)
RBC # BLD: 4.56 M/UL — SIGNIFICANT CHANGE UP (ref 3.8–5.2)
RBC # BLD: 4.56 M/UL — SIGNIFICANT CHANGE UP (ref 3.8–5.2)
RBC # FLD: 12.9 % — SIGNIFICANT CHANGE UP (ref 10.3–14.5)
RBC # FLD: 12.9 % — SIGNIFICANT CHANGE UP (ref 10.3–14.5)
WBC # BLD: 7.1 K/UL — SIGNIFICANT CHANGE UP (ref 3.8–10.5)
WBC # BLD: 7.1 K/UL — SIGNIFICANT CHANGE UP (ref 3.8–10.5)
WBC # FLD AUTO: 7.1 K/UL — SIGNIFICANT CHANGE UP (ref 3.8–10.5)
WBC # FLD AUTO: 7.1 K/UL — SIGNIFICANT CHANGE UP (ref 3.8–10.5)

## 2023-11-13 PROCEDURE — 99285 EMERGENCY DEPT VISIT HI MDM: CPT

## 2023-11-13 PROCEDURE — 71046 X-RAY EXAM CHEST 2 VIEWS: CPT | Mod: 26

## 2023-11-13 PROCEDURE — 93010 ELECTROCARDIOGRAM REPORT: CPT

## 2023-11-13 RX ORDER — HYDRALAZINE HCL 50 MG
10 TABLET ORAL ONCE
Refills: 0 | Status: COMPLETED | OUTPATIENT
Start: 2023-11-13 | End: 2023-11-13

## 2023-11-13 NOTE — ED PROVIDER NOTE - PATIENT PORTAL LINK FT
You can access the FollowMyHealth Patient Portal offered by [facility] by registering at the following website: [email or web address]. By joining [employer]’s FollowMyHealth portal, you will also be able to view your health information using other applications (apps) compatible with our system.

## 2023-11-13 NOTE — ED PROVIDER NOTE - CLINICAL SUMMARY MEDICAL DECISION MAKING FREE TEXT BOX
will get trop and ekg to rule out ACS. Will obtain cbc to rule out anemia. Will obtain CMP to rule out electrolyte abnormalities, liver failure or renal failure. Will obtain a chest xray to rule out PNA, PTX or pleural effusion will obtain lipase to rule out pancreatitis. wells score low, will not pursue pe workup

## 2023-11-13 NOTE — ED ADULT TRIAGE NOTE - CHIEF COMPLAINT QUOTE
Pt c/o non-radiating midsternal chest pain since being outside in the cold on Saturday. Pt reports pain partially relieved by Maalox. Denies shortness of breath. No cough. Pt hypertensive in triage. Did not take BP meds in 2 days. PHx HTN, GERD

## 2023-11-13 NOTE — ED ADULT NURSE NOTE - OBJECTIVE STATEMENT
Pt received to intake room 8. Pt A&Ox4, ambulatory at baseline. Pt c/o midsternal non-radiating chest pain x2days. Pt states she had laryngitis last week. States that she was outside in cold and is concerned that she could have PNA. Denies any cough, SOB, dizziness, headache, n/v/d. Pmh of HTN and GERD. Pt states she has not taken her HTN meds in 2 days. MD velez aware of BP, no further orders at this time. IV access established with 20G to R AC, labs collected and sent as per MD orders. RR even and unlabored, NAD noted. Safety measures in place, pt pending XRAY

## 2023-11-13 NOTE — ED PROVIDER NOTE - OBJECTIVE STATEMENT
47 year old female pmh htn presents with intermittent chest pain for 2 days. began when she was working in cold on saturday. non exertional. no leg swelling. currently asymptomatic in ed. concerned she has pneumonia. no fever no cough. no abd pain.

## 2023-11-13 NOTE — ED PROVIDER NOTE - PROGRESS NOTE DETAILS
scooby: Patient signed out by Dr. Abrams pending repeat Trope.  Patient low risk chest pain will provide follow-up with cardiology.  Otherwise hemodynamically stable with no acute complaints.

## 2023-11-14 VITALS
HEART RATE: 95 BPM | TEMPERATURE: 98 F | RESPIRATION RATE: 16 BRPM | DIASTOLIC BLOOD PRESSURE: 100 MMHG | OXYGEN SATURATION: 100 % | SYSTOLIC BLOOD PRESSURE: 170 MMHG

## 2023-11-14 LAB
ALBUMIN SERPL ELPH-MCNC: 4.2 G/DL — SIGNIFICANT CHANGE UP (ref 3.3–5)
ALBUMIN SERPL ELPH-MCNC: 4.2 G/DL — SIGNIFICANT CHANGE UP (ref 3.3–5)
ALP SERPL-CCNC: 82 U/L — SIGNIFICANT CHANGE UP (ref 40–120)
ALP SERPL-CCNC: 82 U/L — SIGNIFICANT CHANGE UP (ref 40–120)
ALT FLD-CCNC: 10 U/L — SIGNIFICANT CHANGE UP (ref 4–33)
ALT FLD-CCNC: 10 U/L — SIGNIFICANT CHANGE UP (ref 4–33)
ANION GAP SERPL CALC-SCNC: 10 MMOL/L — SIGNIFICANT CHANGE UP (ref 7–14)
ANION GAP SERPL CALC-SCNC: 10 MMOL/L — SIGNIFICANT CHANGE UP (ref 7–14)
AST SERPL-CCNC: 14 U/L — SIGNIFICANT CHANGE UP (ref 4–32)
AST SERPL-CCNC: 14 U/L — SIGNIFICANT CHANGE UP (ref 4–32)
BILIRUB SERPL-MCNC: 0.3 MG/DL — SIGNIFICANT CHANGE UP (ref 0.2–1.2)
BILIRUB SERPL-MCNC: 0.3 MG/DL — SIGNIFICANT CHANGE UP (ref 0.2–1.2)
BUN SERPL-MCNC: 14 MG/DL — SIGNIFICANT CHANGE UP (ref 7–23)
BUN SERPL-MCNC: 14 MG/DL — SIGNIFICANT CHANGE UP (ref 7–23)
CALCIUM SERPL-MCNC: 9.1 MG/DL — SIGNIFICANT CHANGE UP (ref 8.4–10.5)
CALCIUM SERPL-MCNC: 9.1 MG/DL — SIGNIFICANT CHANGE UP (ref 8.4–10.5)
CHLORIDE SERPL-SCNC: 102 MMOL/L — SIGNIFICANT CHANGE UP (ref 98–107)
CHLORIDE SERPL-SCNC: 102 MMOL/L — SIGNIFICANT CHANGE UP (ref 98–107)
CO2 SERPL-SCNC: 26 MMOL/L — SIGNIFICANT CHANGE UP (ref 22–31)
CO2 SERPL-SCNC: 26 MMOL/L — SIGNIFICANT CHANGE UP (ref 22–31)
CREAT SERPL-MCNC: 0.93 MG/DL — SIGNIFICANT CHANGE UP (ref 0.5–1.3)
CREAT SERPL-MCNC: 0.93 MG/DL — SIGNIFICANT CHANGE UP (ref 0.5–1.3)
EGFR: 77 ML/MIN/1.73M2 — SIGNIFICANT CHANGE UP
EGFR: 77 ML/MIN/1.73M2 — SIGNIFICANT CHANGE UP
GLUCOSE SERPL-MCNC: 96 MG/DL — SIGNIFICANT CHANGE UP (ref 70–99)
GLUCOSE SERPL-MCNC: 96 MG/DL — SIGNIFICANT CHANGE UP (ref 70–99)
HCG SERPL-ACNC: <1 MIU/ML — SIGNIFICANT CHANGE UP
HCG SERPL-ACNC: <1 MIU/ML — SIGNIFICANT CHANGE UP
LIDOCAIN IGE QN: 25 U/L — SIGNIFICANT CHANGE UP (ref 7–60)
LIDOCAIN IGE QN: 25 U/L — SIGNIFICANT CHANGE UP (ref 7–60)
POTASSIUM SERPL-MCNC: 3.8 MMOL/L — SIGNIFICANT CHANGE UP (ref 3.5–5.3)
POTASSIUM SERPL-MCNC: 3.8 MMOL/L — SIGNIFICANT CHANGE UP (ref 3.5–5.3)
POTASSIUM SERPL-SCNC: 3.8 MMOL/L — SIGNIFICANT CHANGE UP (ref 3.5–5.3)
POTASSIUM SERPL-SCNC: 3.8 MMOL/L — SIGNIFICANT CHANGE UP (ref 3.5–5.3)
PROT SERPL-MCNC: 7.6 G/DL — SIGNIFICANT CHANGE UP (ref 6–8.3)
PROT SERPL-MCNC: 7.6 G/DL — SIGNIFICANT CHANGE UP (ref 6–8.3)
SODIUM SERPL-SCNC: 138 MMOL/L — SIGNIFICANT CHANGE UP (ref 135–145)
SODIUM SERPL-SCNC: 138 MMOL/L — SIGNIFICANT CHANGE UP (ref 135–145)
TROPONIN T, HIGH SENSITIVITY RESULT: 7 NG/L — SIGNIFICANT CHANGE UP
TROPONIN T, HIGH SENSITIVITY RESULT: 7 NG/L — SIGNIFICANT CHANGE UP
TROPONIN T, HIGH SENSITIVITY RESULT: 8 NG/L — SIGNIFICANT CHANGE UP
TROPONIN T, HIGH SENSITIVITY RESULT: 8 NG/L — SIGNIFICANT CHANGE UP

## 2023-11-14 RX ADMIN — Medication 10 MILLIGRAM(S): at 00:00

## 2024-02-05 ENCOUNTER — EMERGENCY (EMERGENCY)
Facility: HOSPITAL | Age: 49
LOS: 1 days | Discharge: HOME | End: 2024-02-05
Attending: STUDENT IN AN ORGANIZED HEALTH CARE EDUCATION/TRAINING PROGRAM | Admitting: STUDENT IN AN ORGANIZED HEALTH CARE EDUCATION/TRAINING PROGRAM
Payer: COMMERCIAL

## 2024-02-05 VITALS
SYSTOLIC BLOOD PRESSURE: 188 MMHG | HEART RATE: 100 BPM | DIASTOLIC BLOOD PRESSURE: 110 MMHG | OXYGEN SATURATION: 100 % | RESPIRATION RATE: 17 BRPM | TEMPERATURE: 98 F

## 2024-02-05 PROCEDURE — 99285 EMERGENCY DEPT VISIT HI MDM: CPT

## 2024-02-05 PROCEDURE — 93010 ELECTROCARDIOGRAM REPORT: CPT

## 2024-02-05 RX ORDER — KETOROLAC TROMETHAMINE 30 MG/ML
15 SYRINGE (ML) INJECTION ONCE
Refills: 0 | Status: DISCONTINUED | OUTPATIENT
Start: 2024-02-05 | End: 2024-02-05

## 2024-02-05 RX ORDER — ACETAMINOPHEN 500 MG
975 TABLET ORAL ONCE
Refills: 0 | Status: COMPLETED | OUTPATIENT
Start: 2024-02-05 | End: 2024-02-05

## 2024-02-05 RX ORDER — SODIUM CHLORIDE 9 MG/ML
1000 INJECTION INTRAMUSCULAR; INTRAVENOUS; SUBCUTANEOUS ONCE
Refills: 0 | Status: COMPLETED | OUTPATIENT
Start: 2024-02-05 | End: 2024-02-05

## 2024-02-05 NOTE — ED ADULT TRIAGE NOTE - CHIEF COMPLAINT QUOTE
Pt c/o dizziness since yesterday. Denies headache, chest pain. Pt a/so c/o generalized  muscle pain from a trip and fall on Friday. Denies blood thinner use, chest pain,  head strike. Past Medical History: HTN

## 2024-02-05 NOTE — ED PROVIDER NOTE - OBJECTIVE STATEMENT
48-year-old female with past medical history of hypertension and GERD, presenting with lightheadedness for 1 day.  Patient reports that she had a mechanical fall 3 days ago, where she fell onto her knees.  Did not hit head.  Did not lose consciousness.  Did not have any vomiting.  Has been able to ambulate.  The following day, she developed body aches, nausea, lightheadedness.  Has been able to tolerate food at home.  Of note,  has been coughing and having URI-like symptoms.  Patient denies any chest pain, shortness of breath, dysuria, diarrhea, abdominal pain, coughing, congestion, weakness in extremity, visual changes, history of clotting disorders, PE or DVT.

## 2024-02-05 NOTE — ED PROVIDER NOTE - HIV OFFER
Dicyclomine Approved    Filling Pharmacy: [facility]  Additional Information: Pharmacy contacted, left detailed message with approval information.    
Dicyclomine Pending    Insurance response  Prescription Drug Insurance: [employer]  Notes: Prior authorization submitted - will update provider when decision has been made by insurance.           
Hyoscyamine 0.375 mg bid, #60 with 6 refills  
Left voice message to call at [phone].  Medication pended     Lulu Lewis RN      
Patient returning recent call to [facility]. Patient has been transferred to [facility].    
Pharmacy faxed in a request for prior authorization on dicyclomine if taking with tolterodine    Medication: dicyclomine   Dosage: 20 mg  Quantity requested:  120  Pharmacy for prescription has been selected.    Initiation of prior authorization needed.    
Spoke with patient and notified her of new order for Hyoscyamine.  Patient is agreeable to have medication sent to pharmacy and states that she will call GI office back if the medication is too expensive or is not covered by insurance.     Janay Szymanski RN 8/16/2023      
To Dr. Nation please advise     patient notified medication is approved (Bentyl 20mg)  Patient reported that medication (bentyl) is not helping, she spot taking it.   Is there any other medication she can take?    Lulu Lewis RN    
Transferred to triage  
Previously Declined (within the last year)

## 2024-02-05 NOTE — ED PROVIDER NOTE - CLINICAL SUMMARY MEDICAL DECISION MAKING FREE TEXT BOX
48-year-old female with past medical history of hypertension and GERD, presenting with lightheadedness for 1 day.     Vital signs in ED show hypertension 180/110, and tachycardia 100 bpm.  Otherwise normal vital signs.  Physical exam shows strength intact in upper and lower extremities.  No pronator drift, normal finger-nose test, tongue is midline, symmetric smile.  Sensation intact in upper and lower extremities as well as in face.  PERRL.  EOMI.  Lungs clear to auscultation bilaterally, no abdominal tenderness to palpation.  Differentials include but not limited to URI, UTI, pregnancy, anemia, electrolyte abnormality, arrhythmia.  Plan: CBC, CMP, hCG, TSH, mag, flu/COVID, UA/UC, EKG, cardiac monitor, normal saline bolus

## 2024-02-05 NOTE — ED PROVIDER NOTE - PHYSICAL EXAMINATION
GENERAL: NAD, lying in bed comfortably  HEAD:  Atraumatic, Normocephalic  EYES: EOMI, conjunctiva and sclera clear  ENT: Moist mucous membranes  NECK: Supple  CHEST/LUNG: Unlabored respirations. Clear to auscultation bilaterally. No rales, rhonchi, wheezing, or rubs  HEART: Mildly tachycardic. No murmurs, rubs, or gallops  ABDOMEN: Soft, nondistended, nontender  EXTREMITIES:  No cyanosis or edema. Brisk capillary refill  NERVOUS SYSTEM:  No focal deficits.or drift, normal finger-nose test, tongue is midline, symmetric smile.  Sensation intact in upper and lower extremities as well as in face.  PERRL.  EOMI.  SKIN: No rashes or lesions

## 2024-02-05 NOTE — ED PROVIDER NOTE - ATTENDING CONTRIBUTION TO CARE
47 yo F hx HTN, GERD, presenting for evaluation of body aches and feeling light headed and nauseous for the past few days. Pt reports tripping over a been bag, and falling onto her knees on Friday. She grabbed onto a table as she was going down, did not hit her head, no LOC. Denies cp, sob, palpitations. Denies headache, visual changes, focal weakness/numbness/tingling. Of note, her  is at home with URI. She felt anxious about her symptoms so wanted to get checked out. History of low potassium. EKG nsr, intervals wnl.     VITALS: reviewed  GEN: NAD, A & O x 4  HEAD/EYES: NCAT, PERRL, EOMI, anicteric sclerae, no conjunctival pallor  ENT: mucus membranes moist, oropharynx WNL, trachea midline, no JVD  RESP: lungs CTA with equal breath sounds bilaterally, chest wall nontender and atraumatic  CV: heart with reg rhythm S1, S2, no murmur; distal pulses intact and symmetric bilaterally  ABDOMEN: normoactive bowel sounds, soft, nondistended, nontender, no palpable masses  : no CVAT  MSK: extremities atraumatic and nontender, no edema, no asymmetry. the back is without midline or lateral tenderness, there is no spinal deformity or stepoff and the back is ranged painlessly. the neck has no midline tenderness, deformity, or stepoff, and is ranged painlessly.  SKIN: warm, dry, no rash, no bruising, no cyanosis. color appropriate for ethnicity  NEURO: alert, mentating appropriately, no facial asymmetry. gross sensation, motor, coordination are intact  PSYCH: Affect appropriate    Exam unremarkable, will tx pain, check labs, including electrolytes, and reassess. No head trauma, no HA, no neuro symptoms, EKG wnl, bp elevated likely 2/2 pain but will obtain troponin and tsh. Pt anxious appearing on exam. Likely dc with return recs.

## 2024-02-05 NOTE — ED PROVIDER NOTE - NSFOLLOWUPINSTRUCTIONS_ED_ALL_ED_FT
You were seen in the emergency department for lightheadedness     1) Follow-up with your primary care provider in 1-5 days.    2) Continue to take all medications as prescribed.    3) Rest and stay hydrated. Pain can be managed with Acetaminophen (aka Tylenol)  over the counter as directed.    4) Return to the ER for any new or worsening symptoms. Some symptoms to look out for include  chest pain, shortness of breath, breaking out into sweats, passing out, inability to keep food down, or any other worsening or concerning symptoms.       Please read all attached patient information.

## 2024-02-05 NOTE — ED PROVIDER NOTE - PATIENT PORTAL LINK FT
You can access the FollowMyHealth Patient Portal offered by [facility] by registering at the following website: [email or web address]. By joining [employer]’s FollowMyHealth portal, you will also be able to view your health information using other applications (apps) compatible with our system.

## 2024-02-06 VITALS
OXYGEN SATURATION: 100 % | TEMPERATURE: 98 F | HEART RATE: 86 BPM | DIASTOLIC BLOOD PRESSURE: 110 MMHG | RESPIRATION RATE: 16 BRPM | SYSTOLIC BLOOD PRESSURE: 169 MMHG

## 2024-02-06 LAB
ALBUMIN SERPL ELPH-MCNC: 4.1 G/DL — SIGNIFICANT CHANGE UP (ref 3.3–5)
ALP SERPL-CCNC: 80 U/L — SIGNIFICANT CHANGE UP (ref 40–120)
ALT FLD-CCNC: 12 U/L — SIGNIFICANT CHANGE UP (ref 4–33)
ANION GAP SERPL CALC-SCNC: 12 MMOL/L — SIGNIFICANT CHANGE UP (ref 7–14)
AST SERPL-CCNC: 16 U/L — SIGNIFICANT CHANGE UP (ref 4–32)
BASOPHILS # BLD AUTO: 0.09 K/UL — SIGNIFICANT CHANGE UP (ref 0–0.2)
BASOPHILS NFR BLD AUTO: 1.1 % — SIGNIFICANT CHANGE UP (ref 0–2)
BILIRUB SERPL-MCNC: 0.3 MG/DL — SIGNIFICANT CHANGE UP (ref 0.2–1.2)
BUN SERPL-MCNC: 13 MG/DL — SIGNIFICANT CHANGE UP (ref 7–23)
CALCIUM SERPL-MCNC: 9.2 MG/DL — SIGNIFICANT CHANGE UP (ref 8.4–10.5)
CHLORIDE SERPL-SCNC: 101 MMOL/L — SIGNIFICANT CHANGE UP (ref 98–107)
CO2 SERPL-SCNC: 25 MMOL/L — SIGNIFICANT CHANGE UP (ref 22–31)
CREAT SERPL-MCNC: 0.73 MG/DL — SIGNIFICANT CHANGE UP (ref 0.5–1.3)
EGFR: 103 ML/MIN/1.73M2 — SIGNIFICANT CHANGE UP
EOSINOPHIL # BLD AUTO: 0.17 K/UL — SIGNIFICANT CHANGE UP (ref 0–0.5)
EOSINOPHIL NFR BLD AUTO: 2 % — SIGNIFICANT CHANGE UP (ref 0–6)
FLUAV AG NPH QL: SIGNIFICANT CHANGE UP
FLUBV AG NPH QL: SIGNIFICANT CHANGE UP
GLUCOSE SERPL-MCNC: 97 MG/DL — SIGNIFICANT CHANGE UP (ref 70–99)
HCG SERPL-ACNC: <1 MIU/ML — SIGNIFICANT CHANGE UP
HCT VFR BLD CALC: 38.8 % — SIGNIFICANT CHANGE UP (ref 34.5–45)
HGB BLD-MCNC: 13.2 G/DL — SIGNIFICANT CHANGE UP (ref 11.5–15.5)
IANC: 6.13 K/UL — SIGNIFICANT CHANGE UP (ref 1.8–7.4)
IMM GRANULOCYTES NFR BLD AUTO: 0.2 % — SIGNIFICANT CHANGE UP (ref 0–0.9)
LYMPHOCYTES # BLD AUTO: 1.17 K/UL — SIGNIFICANT CHANGE UP (ref 1–3.3)
LYMPHOCYTES # BLD AUTO: 13.8 % — SIGNIFICANT CHANGE UP (ref 13–44)
MAGNESIUM SERPL-MCNC: 2.1 MG/DL — SIGNIFICANT CHANGE UP (ref 1.6–2.6)
MCHC RBC-ENTMCNC: 28.2 PG — SIGNIFICANT CHANGE UP (ref 27–34)
MCHC RBC-ENTMCNC: 34 GM/DL — SIGNIFICANT CHANGE UP (ref 32–36)
MCV RBC AUTO: 82.9 FL — SIGNIFICANT CHANGE UP (ref 80–100)
MONOCYTES # BLD AUTO: 0.92 K/UL — HIGH (ref 0–0.9)
MONOCYTES NFR BLD AUTO: 10.8 % — SIGNIFICANT CHANGE UP (ref 2–14)
NEUTROPHILS # BLD AUTO: 6.13 K/UL — SIGNIFICANT CHANGE UP (ref 1.8–7.4)
NEUTROPHILS NFR BLD AUTO: 72.1 % — SIGNIFICANT CHANGE UP (ref 43–77)
NRBC # BLD: 0 /100 WBCS — SIGNIFICANT CHANGE UP (ref 0–0)
NRBC # FLD: 0 K/UL — SIGNIFICANT CHANGE UP (ref 0–0)
PLATELET # BLD AUTO: 341 K/UL — SIGNIFICANT CHANGE UP (ref 150–400)
POTASSIUM SERPL-MCNC: 3.4 MMOL/L — LOW (ref 3.5–5.3)
POTASSIUM SERPL-SCNC: 3.4 MMOL/L — LOW (ref 3.5–5.3)
PROT SERPL-MCNC: 7.6 G/DL — SIGNIFICANT CHANGE UP (ref 6–8.3)
RBC # BLD: 4.68 M/UL — SIGNIFICANT CHANGE UP (ref 3.8–5.2)
RBC # FLD: 13.1 % — SIGNIFICANT CHANGE UP (ref 10.3–14.5)
RSV RNA NPH QL NAA+NON-PROBE: SIGNIFICANT CHANGE UP
SARS-COV-2 RNA SPEC QL NAA+PROBE: SIGNIFICANT CHANGE UP
SODIUM SERPL-SCNC: 138 MMOL/L — SIGNIFICANT CHANGE UP (ref 135–145)
TSH SERPL-MCNC: 2.91 UIU/ML — SIGNIFICANT CHANGE UP (ref 0.27–4.2)
WBC # BLD: 8.5 K/UL — SIGNIFICANT CHANGE UP (ref 3.8–10.5)
WBC # FLD AUTO: 8.5 K/UL — SIGNIFICANT CHANGE UP (ref 3.8–10.5)

## 2024-02-06 RX ORDER — POTASSIUM CHLORIDE 20 MEQ
40 PACKET (EA) ORAL ONCE
Refills: 0 | Status: COMPLETED | OUTPATIENT
Start: 2024-02-06 | End: 2024-02-06

## 2024-02-06 RX ADMIN — SODIUM CHLORIDE 1000 MILLILITER(S): 9 INJECTION INTRAMUSCULAR; INTRAVENOUS; SUBCUTANEOUS at 00:17

## 2024-02-06 RX ADMIN — Medication 975 MILLIGRAM(S): at 00:18

## 2024-02-06 RX ADMIN — Medication 15 MILLIGRAM(S): at 00:17

## 2024-02-06 RX ADMIN — Medication 40 MILLIEQUIVALENT(S): at 01:54

## 2024-02-06 NOTE — ED ADULT NURSE REASSESSMENT NOTE - NS ED NURSE REASSESS COMMENT FT1
INTAKE RN. Patient A&Ox4, resting in stretcher, respirations even and unlabored. Patient states improvement in condition. Vitals as noted. Patient states took at home BP medication (losartan-potassium 100mg orally), MD Pacheco aware. Patient awaiting dispo.

## 2024-02-06 NOTE — ED ADULT NURSE NOTE - NSFALLRISKINTERV_ED_ALL_ED

## 2024-02-06 NOTE — ED ADULT NURSE NOTE - NSFALLRISK_ED_ALL_ED
Patient discharged to home with two daughters. Patient denies CP, SOB, dizziness, or any abnormal symptoms at time of discharge. See flow sheets for most recent VS.    No

## 2024-02-06 NOTE — ED ADULT NURSE NOTE - OBJECTIVE STATEMENT
INTAKE RN. Patient A&Ox4 ambulatory c/o dizziness, nausea and body aches x2 days. Hx. HTN. HLD. Patient states she had a recent fall 4 days ago, states she tripped over furniture landing on both knees therefore she thought symptoms occurred due to fall. Patient denies LOC or hitting head. Patient ambulatory without difficulty. Neuro exam intact on assessment. Patient well appearing, respirations even and unlabored. No swelling or obvious deformities observed to knees. Patient denies weakness or numbness to extremities, vision changes, headaches, chest pain, sob, fevers/chills or blood thinner use. 20G IV placed to left wrist, labs collected and sent to lab. Patient medicated per orders. Stretcher in lowest position, wheels locked, appropriate side rails in place, call bell in reach.

## 2024-05-01 ENCOUNTER — EMERGENCY (EMERGENCY)
Facility: HOSPITAL | Age: 49
LOS: 1 days | Discharge: HOME | End: 2024-05-01
Attending: EMERGENCY MEDICINE | Admitting: EMERGENCY MEDICINE
Payer: COMMERCIAL

## 2024-05-01 VITALS
HEART RATE: 98 BPM | SYSTOLIC BLOOD PRESSURE: 192 MMHG | DIASTOLIC BLOOD PRESSURE: 139 MMHG | TEMPERATURE: 98 F | RESPIRATION RATE: 18 BRPM | OXYGEN SATURATION: 96 %

## 2024-05-01 VITALS
OXYGEN SATURATION: 99 % | DIASTOLIC BLOOD PRESSURE: 129 MMHG | SYSTOLIC BLOOD PRESSURE: 196 MMHG | HEART RATE: 110 BPM | RESPIRATION RATE: 18 BRPM

## 2024-05-01 LAB
ALBUMIN SERPL ELPH-MCNC: 3.4 G/DL — SIGNIFICANT CHANGE UP (ref 3.3–5)
ALP SERPL-CCNC: 55 U/L — SIGNIFICANT CHANGE UP (ref 40–120)
ALT FLD-CCNC: <5 U/L — SIGNIFICANT CHANGE UP (ref 4–33)
ANION GAP SERPL CALC-SCNC: 12 MMOL/L — SIGNIFICANT CHANGE UP (ref 7–14)
AST SERPL-CCNC: 90 U/L — HIGH (ref 4–32)
BASOPHILS # BLD AUTO: 0.11 K/UL — SIGNIFICANT CHANGE UP (ref 0–0.2)
BASOPHILS NFR BLD AUTO: 1.4 % — SIGNIFICANT CHANGE UP (ref 0–2)
BILIRUB SERPL-MCNC: 0.3 MG/DL — SIGNIFICANT CHANGE UP (ref 0.2–1.2)
BUN SERPL-MCNC: 13 MG/DL — SIGNIFICANT CHANGE UP (ref 7–23)
CA-I BLD-SCNC: 0.99 MMOL/L — LOW (ref 1.15–1.29)
CALCIUM SERPL-MCNC: 6.9 MG/DL — LOW (ref 8.4–10.5)
CHLORIDE SERPL-SCNC: 107 MMOL/L — SIGNIFICANT CHANGE UP (ref 98–107)
CO2 SERPL-SCNC: 16 MMOL/L — LOW (ref 22–31)
CREAT SERPL-MCNC: 0.47 MG/DL — LOW (ref 0.5–1.3)
EGFR: 119 ML/MIN/1.73M2 — SIGNIFICANT CHANGE UP
EOSINOPHIL # BLD AUTO: 0.26 K/UL — SIGNIFICANT CHANGE UP (ref 0–0.5)
EOSINOPHIL NFR BLD AUTO: 3.4 % — SIGNIFICANT CHANGE UP (ref 0–6)
GLUCOSE SERPL-MCNC: 76 MG/DL — SIGNIFICANT CHANGE UP (ref 70–99)
HCG SERPL-ACNC: <1 MIU/ML — SIGNIFICANT CHANGE UP
HCT VFR BLD CALC: 42.6 % — SIGNIFICANT CHANGE UP (ref 34.5–45)
HGB BLD-MCNC: 14.1 G/DL — SIGNIFICANT CHANGE UP (ref 11.5–15.5)
IANC: 5.2 K/UL — SIGNIFICANT CHANGE UP (ref 1.8–7.4)
IMM GRANULOCYTES NFR BLD AUTO: 0.4 % — SIGNIFICANT CHANGE UP (ref 0–0.9)
LYMPHOCYTES # BLD AUTO: 1.24 K/UL — SIGNIFICANT CHANGE UP (ref 1–3.3)
LYMPHOCYTES # BLD AUTO: 16.2 % — SIGNIFICANT CHANGE UP (ref 13–44)
MAGNESIUM SERPL-MCNC: 1.8 MG/DL — SIGNIFICANT CHANGE UP (ref 1.6–2.6)
MCHC RBC-ENTMCNC: 28.3 PG — SIGNIFICANT CHANGE UP (ref 27–34)
MCHC RBC-ENTMCNC: 33.1 GM/DL — SIGNIFICANT CHANGE UP (ref 32–36)
MCV RBC AUTO: 85.5 FL — SIGNIFICANT CHANGE UP (ref 80–100)
MONOCYTES # BLD AUTO: 0.81 K/UL — SIGNIFICANT CHANGE UP (ref 0–0.9)
MONOCYTES NFR BLD AUTO: 10.6 % — SIGNIFICANT CHANGE UP (ref 2–14)
NEUTROPHILS # BLD AUTO: 5.2 K/UL — SIGNIFICANT CHANGE UP (ref 1.8–7.4)
NEUTROPHILS NFR BLD AUTO: 68 % — SIGNIFICANT CHANGE UP (ref 43–77)
NRBC # BLD: 0 /100 WBCS — SIGNIFICANT CHANGE UP (ref 0–0)
NRBC # FLD: 0 K/UL — SIGNIFICANT CHANGE UP (ref 0–0)
PLATELET # BLD AUTO: 377 K/UL — SIGNIFICANT CHANGE UP (ref 150–400)
POTASSIUM SERPL-MCNC: SIGNIFICANT CHANGE UP MMOL/L (ref 3.5–5.3)
POTASSIUM SERPL-SCNC: SIGNIFICANT CHANGE UP MMOL/L (ref 3.5–5.3)
PROT SERPL-MCNC: SIGNIFICANT CHANGE UP G/DL (ref 6–8.3)
RBC # BLD: 4.98 M/UL — SIGNIFICANT CHANGE UP (ref 3.8–5.2)
RBC # FLD: 13 % — SIGNIFICANT CHANGE UP (ref 10.3–14.5)
SODIUM SERPL-SCNC: 135 MMOL/L — SIGNIFICANT CHANGE UP (ref 135–145)
WBC # BLD: 7.65 K/UL — SIGNIFICANT CHANGE UP (ref 3.8–10.5)
WBC # FLD AUTO: 7.65 K/UL — SIGNIFICANT CHANGE UP (ref 3.8–10.5)

## 2024-05-01 PROCEDURE — 99285 EMERGENCY DEPT VISIT HI MDM: CPT

## 2024-05-01 PROCEDURE — 93010 ELECTROCARDIOGRAM REPORT: CPT

## 2024-05-01 PROCEDURE — 70496 CT ANGIOGRAPHY HEAD: CPT | Mod: 26,MC

## 2024-05-01 PROCEDURE — 70498 CT ANGIOGRAPHY NECK: CPT | Mod: 26,MC

## 2024-05-01 RX ORDER — METOCLOPRAMIDE HCL 10 MG
10 TABLET ORAL ONCE
Refills: 0 | Status: COMPLETED | OUTPATIENT
Start: 2024-05-01 | End: 2024-05-01

## 2024-05-01 RX ORDER — KETOROLAC TROMETHAMINE 30 MG/ML
15 SYRINGE (ML) INJECTION ONCE
Refills: 0 | Status: DISCONTINUED | OUTPATIENT
Start: 2024-05-01 | End: 2024-05-01

## 2024-05-01 RX ORDER — ACETAMINOPHEN 500 MG
1000 TABLET ORAL ONCE
Refills: 0 | Status: COMPLETED | OUTPATIENT
Start: 2024-05-01 | End: 2024-05-01

## 2024-05-01 RX ORDER — SODIUM CHLORIDE 9 MG/ML
1000 INJECTION INTRAMUSCULAR; INTRAVENOUS; SUBCUTANEOUS ONCE
Refills: 0 | Status: COMPLETED | OUTPATIENT
Start: 2024-05-01 | End: 2024-05-01

## 2024-05-01 RX ADMIN — Medication 400 MILLIGRAM(S): at 23:01

## 2024-05-01 RX ADMIN — Medication 15 MILLIGRAM(S): at 23:02

## 2024-05-01 RX ADMIN — SODIUM CHLORIDE 1000 MILLILITER(S): 9 INJECTION INTRAMUSCULAR; INTRAVENOUS; SUBCUTANEOUS at 23:01

## 2024-05-01 RX ADMIN — Medication 10 MILLIGRAM(S): at 23:01

## 2024-05-01 NOTE — ED PROVIDER NOTE - ATTENDING CONTRIBUTION TO CARE
Brief HPI:  48-year-old female past medical history of hypertension, hypokalemia, GERD presents for headache, neck spasms, lightheadedness for 3 days.  Patient states that headache nonacute onset, not maximal in onset, feels like electric shocks radiating from the right occiput to the right frontal region, intermittent, no aggravating or alleviating factors, associated with spasms in the neck and tenderness of the right ear.  Patient denies having symptoms like this in the past.  Reports generalized weakness for the last 2 weeks.  Patient states that she has not taken her blood pressure medication for 2 days.  Denies sick contacts or recent travel.  Denies tobacco, alcohol or illicit drug use.  Denies fever, chills, nausea, vomiting, visual changes, speech changes, numbness, extremity weakness, photophobia.    Vitals:   Reviewed    Exam:    GEN:  Non-toxic appearing, non-distressed, speaking full sentences, non-diaphoretic, AAOx3  HEENT:  NCAT, neck supple, EOMI, PERRLA, sclera anicteric, no conjunctival pallor or injection, no stridor, normal voice, no tonsillar exudate, uvula midline  CV:  regular rhythm and rate, s1/s2 audible, no murmurs, rubs or gallops, peripheral pulses 2+ and symmetric  PULM:  non-labored respirations, lungs clear to auscultation bilaterally, no wheezes, crackles or rales  ABD:  non distended, non-tender, no rebound, no guarding, negative Jackson's sign, bowel sounds normal, no cvat  MSK:  no gross deformity, non-tender extremities and joints, range of motion grossly normal appearing, no extremity edema, extremities warm and well perfused   NEURO:  AAOx3, CN II-XII intact, motor 5/5 in upper and lower extremities bilaterally, sensation grossly intact in extremities and trunk, finger to nose testing wnl, no nystagmus, negative Romberg, no pronator drift, no gait deficit  SKIN:  warm, dry, no rash or vesicles     A/P:  48-year-old female past medical history of hypertension, hypokalemia, GERD presents for headache, neck spasms, lightheadedness for 3 days.  Blood pressure elevated, likely in setting of not taking antihypertensive.  Neuroexam nonfocal, NIH stroke scale 0.  Low concern for acute CVA.  Low concern for subarachnoid hemorrhage as headache not acute in onset, not maximal in onset, and is without associated photophobia or neck stiffness.  Low concern for meningitis as patient without fever, photophobia, or neck stiffness.  Will send labs, CTA given reported neck pain and tenderness to evaluate for aneurysm or mass occupying lesion, supportive care.  Disposition pending.

## 2024-05-01 NOTE — ED PROVIDER NOTE - PATIENT PORTAL LINK FT
You can access the FollowMyHealth Patient Portal offered by [facility] by registering at the following website: [email or web address]. By joining [employer]’s FollowMyHealth portal, you will also be able to view your health information using other applications (apps) compatible with our system.

## 2024-05-01 NOTE — ED PROVIDER NOTE - PHYSICAL EXAMINATION
GENERAL: NAD  HEAD: normocephalic, atraumatic  HEENT: normal conjunctiva, oral mucosa moist, uvula midline, neck supple  CARDIAC: regular rate and rhythm, normal S1S2, no appreciable murmurs  PULM: speaking in full sentences, normal breath sounds, clear to ascultation bilaterally, no rales, rhonchi, wheezing  GI: abdomen nondistended, soft, nontender  : no CVA tenderness b/l, no suprapubic tenderness  NEURO: moving all 4 extremities, no focal deficits, normal speech, AOx3, in tact finger to nose, no pronator drift  MSK: no peripheral edema, no calf tenderness b/l  SKIN: well-perfused, extremities warm

## 2024-05-01 NOTE — ED ADULT NURSE NOTE - OBJECTIVE STATEMENT
Pt is AAOX4. Pt has c/o headache with dizziness and numbness to right hand for the past few days but reports it increased today. Pt has hx of HTN but did not take her losartan last night because she felt it was causing her symptoms. Pt took medication in triage area she reports. NST on monitor. IV 20G placed to left hand

## 2024-05-01 NOTE — ED PROVIDER NOTE - NSFOLLOWUPINSTRUCTIONS_ED_ALL_ED_FT
Today in the emergency department your evaluated for feeling pains in your head and weakness.  We did a CT scan of your head and your neck that did not show any aneurysms, stroke, bleeding.  Please follow-up with a neurologist if you continue with symptoms.     Please follow up with your primary care physician within 1-2 weeks of discharge from the emergency department.  Please bring a copy of your results with you.  Please return to the emergency department for worsening of your symptoms.    You may take Acetaminophen over the counter as needed for pain and/or fever. Use as directed and see medication warnings.  You may take Ibuprofen over the counter as needed for pain and/or fever. Use as directed and see medication warnings.

## 2024-05-01 NOTE — ED PROVIDER NOTE - PROGRESS NOTE DETAILS
Reassessed and feeling improved after medications.  Likely migraine headache.  Dispo to home with outpatient neurology follow-up.

## 2024-05-01 NOTE — ED PROVIDER NOTE - OBJECTIVE STATEMENT
48-year-old female with a history of HTN on losartan presenting with "electric shocks" to her brain, intermittent headache.  Patient has not been taking her blood pressure medication for the last 2 days because she feels it has been contributing to her headaches.  She feels like she is just been more weak over the last 2 days as well.  She otherwise has no significant chest pain, shortness of breath, vision changes, onset 3/2/abdominal pain, dysuria, peripheral edema, fever/chills.  NKDA.

## 2024-05-01 NOTE — ED PROVIDER NOTE - CLINICAL SUMMARY MEDICAL DECISION MAKING FREE TEXT BOX
48-year-old female with history of HTN on losartan presenting with headaches, weakness over the last 2 to 3 days.  Concern for ICH versus aneurysm versus complex migraine headache.  Labs, CTA head and neck ordered.

## 2024-05-01 NOTE — ED ADULT TRIAGE NOTE - CHIEF COMPLAINT QUOTE
Pt presents to ED via wheelchair from home with c/o neck spasms, dizziness, and weakness x 2 weeks. Pt reports these symptoms started after her menstrual cycle. Pt reports hx of HTN and low potassium. Pt reports she hasn't taken her BP medication in 2 days.

## 2024-05-01 NOTE — ED ADULT NURSE REASSESSMENT NOTE - NS ED NURSE REASSESS COMMENT FT1
Pt received from previous shift on stretcher, A/Ox4 answers all questions appropriately. Pt denies chest pain and SOB during reassessment, breathing is even and unlabored on room air. Abdomen is soft and non-distended, non-tender to touch. Pt ambulates independently at baseline, moves all four extremities on command, skin is intact. Pt resting comfortably at the bedside, no apparent acute visible distress noted on reassessment. Vital signs stable, NKA to medications. Pending CT imaging

## 2024-06-01 NOTE — DISCHARGE NOTE PROVIDER - NSDCCPCAREPLAN_GEN_ALL_CORE_FT
PRINCIPAL DISCHARGE DIAGNOSIS  Diagnosis: Pneumonia  Assessment and Plan of Treatment: Pneumonia is a lung infection that can cause a fever, cough, and trouble breathing.  Nutrition is important, eat small frequent meals.  Get lots of rest and drink fluids.  Call your health care provider upon arrival home from hospital and make a follow up appointment for one week.  If your cough worsens, you develop fever greater than 101', you have shaking chills, a fast heartbeat, trouble breathing and/or feel your are breathing much faster than usual, call your healthcare provider.  Make sure you wash your hands frequently.      SECONDARY DISCHARGE DIAGNOSES  Diagnosis: HTN (hypertension)  Assessment and Plan of Treatment: Low salt diet  Activity as tolerated.  Take all medication as prescribed.  Follow up with your medical doctor for routine blood pressure monitoring at your next visit.  Notify your doctor if you have any of the following symptoms:   Dizziness, Lightheadedness, Blurry vision, Headache, Chest pain, Shortness of breath    Diagnosis: Bacteremia  Assessment and Plan of Treatment: For information on Fall & Injury Prevention, visit: [email or web address] OR  [email or web address] OR  https://www.cdc.gov/steadi/patient.html PRINCIPAL DISCHARGE DIAGNOSIS  Diagnosis: Pneumonia  Assessment and Plan of Treatment: Rx Ceftin & Albuterol INH, sent to pt pharmacy  Pneumonia is a lung infection that can cause a fever, cough, and trouble breathing.  Nutrition is important, eat small frequent meals.  Pt Hemoglobin  A1c 5.7(slightly elevated) avoid concentrated sweets  Get lots of rest and drink fluids.  Call your health care provider upon arrival home from hospital and make a follow up appointment for one week.  If your cough worsens, you develop fever greater than 101', you have shaking chills, a fast heartbeat, trouble breathing and/or feel your are breathing much faster than usual, call your healthcare provider.  Make sure you wash your hands frequently.      SECONDARY DISCHARGE DIAGNOSES  Diagnosis: HTN (hypertension)  Assessment and Plan of Treatment: followup PMD next week; call for appt.  continue Losartan 50mg twice daily  Rx Hydrochlorothiazide 25mg daily sent to pharmacy  Low salt diet(2 gram sodium)controlled carb diet  avoid NSAIDS(ibuprofen,naproxen)  Activity as tolerated.  Take all medication as prescribed.  Follow up with your medical doctor for routine blood pressure monitoring at your next visit.  Notify your doctor if you have any of the following symptoms:   Dizziness, Lightheadedness, Blurry vision, Headache, Chest pain, Shortness of breath    Diagnosis: Bacteremia  Assessment and Plan of Treatment:

## 2024-06-11 NOTE — ED PROVIDER NOTE - CPE EDP SKIN NORM
[FreeTextEntry1] : [facility] Labs reviewed with pt today Gyn, mammogram due derm utd Tdap utd 7 years ago f/u prn or 1 year
normal...

## 2024-06-12 NOTE — ED ADULT NURSE NOTE - NSIMPLEMENTINTERV_GEN_ALL_ED
no back pain, no gout, no musculoskeletal pain, no neck pain, and no weakness.
Implemented All Universal Safety Interventions:  [place] to call system. Call bell, personal items and telephone within reach. Instruct patient to call for assistance. Room bathroom lighting operational. Non-slip footwear when patient is off stretcher. Physically safe environment: no spills, clutter or unnecessary equipment. Stretcher in lowest position, wheels locked, appropriate side rails in place.

## 2024-06-18 ENCOUNTER — EMERGENCY (EMERGENCY)
Facility: HOSPITAL | Age: 49
LOS: 1 days | Discharge: HOME | End: 2024-06-18
Attending: EMERGENCY MEDICINE | Admitting: EMERGENCY MEDICINE
Payer: COMMERCIAL

## 2024-06-18 VITALS
WEIGHT: 190.04 LBS | DIASTOLIC BLOOD PRESSURE: 126 MMHG | OXYGEN SATURATION: 98 % | TEMPERATURE: 99 F | SYSTOLIC BLOOD PRESSURE: 174 MMHG | HEART RATE: 99 BPM | RESPIRATION RATE: 18 BRPM

## 2024-06-18 LAB
BASOPHILS # BLD AUTO: 0.05 K/UL — SIGNIFICANT CHANGE UP (ref 0–0.2)
BASOPHILS NFR BLD AUTO: 1 % — SIGNIFICANT CHANGE UP (ref 0–2)
EOSINOPHIL # BLD AUTO: 0.03 K/UL — SIGNIFICANT CHANGE UP (ref 0–0.5)
EOSINOPHIL NFR BLD AUTO: 0.6 % — SIGNIFICANT CHANGE UP (ref 0–6)
HCT VFR BLD CALC: 39.9 % — SIGNIFICANT CHANGE UP (ref 34.5–45)
HGB BLD-MCNC: 13.7 G/DL — SIGNIFICANT CHANGE UP (ref 11.5–15.5)
IANC: 3.69 K/UL — SIGNIFICANT CHANGE UP (ref 1.8–7.4)
IMM GRANULOCYTES NFR BLD AUTO: 0.4 % — SIGNIFICANT CHANGE UP (ref 0–0.9)
LYMPHOCYTES # BLD AUTO: 0.6 K/UL — LOW (ref 1–3.3)
LYMPHOCYTES # BLD AUTO: 11.9 % — LOW (ref 13–44)
MCHC RBC-ENTMCNC: 29 PG — SIGNIFICANT CHANGE UP (ref 27–34)
MCHC RBC-ENTMCNC: 34.3 GM/DL — SIGNIFICANT CHANGE UP (ref 32–36)
MCV RBC AUTO: 84.4 FL — SIGNIFICANT CHANGE UP (ref 80–100)
MONOCYTES # BLD AUTO: 0.66 K/UL — SIGNIFICANT CHANGE UP (ref 0–0.9)
MONOCYTES NFR BLD AUTO: 13.1 % — SIGNIFICANT CHANGE UP (ref 2–14)
NEUTROPHILS # BLD AUTO: 3.69 K/UL — SIGNIFICANT CHANGE UP (ref 1.8–7.4)
NEUTROPHILS NFR BLD AUTO: 73 % — SIGNIFICANT CHANGE UP (ref 43–77)
NRBC # BLD: 0 /100 WBCS — SIGNIFICANT CHANGE UP (ref 0–0)
NRBC # FLD: 0 K/UL — SIGNIFICANT CHANGE UP (ref 0–0)
PLATELET # BLD AUTO: 306 K/UL — SIGNIFICANT CHANGE UP (ref 150–400)
RBC # BLD: 4.73 M/UL — SIGNIFICANT CHANGE UP (ref 3.8–5.2)
RBC # FLD: 13 % — SIGNIFICANT CHANGE UP (ref 10.3–14.5)
WBC # BLD: 5.05 K/UL — SIGNIFICANT CHANGE UP (ref 3.8–10.5)
WBC # FLD AUTO: 5.05 K/UL — SIGNIFICANT CHANGE UP (ref 3.8–10.5)

## 2024-06-18 PROCEDURE — 71046 X-RAY EXAM CHEST 2 VIEWS: CPT | Mod: 26

## 2024-06-18 PROCEDURE — 93010 ELECTROCARDIOGRAM REPORT: CPT

## 2024-06-18 PROCEDURE — 99285 EMERGENCY DEPT VISIT HI MDM: CPT

## 2024-06-18 RX ORDER — FAMOTIDINE 10 MG/ML
20 INJECTION INTRAVENOUS ONCE
Refills: 0 | Status: COMPLETED | OUTPATIENT
Start: 2024-06-18 | End: 2024-06-18

## 2024-06-18 RX ADMIN — Medication 30 MILLILITER(S): at 23:14

## 2024-06-18 RX ADMIN — FAMOTIDINE 20 MILLIGRAM(S): 10 INJECTION INTRAVENOUS at 23:14

## 2024-06-18 NOTE — ED PROVIDER NOTE - PROGRESS NOTE DETAILS
Edmar DIXON: Patient re-evaluated and feeling improved. Lab and radiology tests reviewed with patient and family.  Patient stable for discharge. Will follow up with GI. Follow up instructions given, importance of follow up emphasized, return to ED parameters reviewed and patient verbalized understanding.  All questions answered, all concerns addressed.

## 2024-06-18 NOTE — ED PROVIDER NOTE - PHYSICAL EXAMINATION
PHYSICAL EXAM:  GENERAL: NAD, lying in bed comfortably  HEAD:  Atraumatic, Normocephalic  EYES: EOMI, PERRLA, conjunctiva and sclera clear  ENT: No erythema/pallor/petechiae/lesions; TMs clear b/l  NECK: Supple, No JVD  LUNG: CTA b/l; no r/r/w  HEART: RRR, +S1/S2; No m/r/g  ABDOMEN: soft, NT/ND; BS audible   EXTREMITIES:  2+ Peripheral Pulses, brisk cap refill. No clubbing, cyanosis, or edema  NERVOUS SYSTEM:  AAOx3, speech clear. No sensory/motor deficits   SKIN: No rashes or lesions

## 2024-06-18 NOTE — ED ADULT TRIAGE NOTE - BIRTH SEX
Patient presents from PACU post surgery; PACU RN explained colonoscopy was performed on 4/27/22, then Laproscopic BSO on 4/28/22; vitals WNL; 5 surgical sites with gauze and tegaderm; no drainage noted on sites; patient alert and oriented; patient stable at this time.   Female

## 2024-06-18 NOTE — ED PROVIDER NOTE - NSFOLLOWUPINSTRUCTIONS_ED_ALL_ED_FT
WHAT YOU NEED TO KNOW:    What do I need to know about chest pain? Chest pain can be caused by a range of conditions, from not serious to life-threatening. It is important to follow up with your healthcare provider to find the cause of your chest pain.    What may cause or increase my risk for chest pain?    A digestion problem, such as acid reflux or a stomach ulcer    An anxiety attack or a strong emotion, such as anger    Infection, inflammation, or a fracture in the bones or cartilage in your chest    Poor blood flow to your heart (angina)    A life-threatening condition, such as a heart attack or blood clot in your lungs  What other symptoms might I have with chest pain?    A burning feeling behind your breastbone    A racing or slow heartbeat    Fever or sweating    Nausea or vomiting    Shortness of breath    Discomfort or pressure that spreads from your chest to your back, jaw, or arm    Feeling weak, tired, or faint  How is the cause of chest pain diagnosed? Your healthcare provider will examine you. Describe your chest pain in as much detail as possible. Tell him or her where your pain is and when it began. Tell the provider if you notice anything that makes the pain worse or better. Tell him or her if it is constant or comes and goes. Also include any other symptoms you have with the chest pain, such as sweating or nausea. Your provider will ask about any medicines you use and medical conditions you have. Tell him or her if you have a family history of heart disease. You may also need any of the following tests:    An EKG is a test that records your heart's electrical activity.    Blood tests check for heart damage and signs of a heart attack.    An echocardiogram uses sound waves to see if blood is flowing normally through your heart.    An ultrasound, x-ray, CT, or MRI scan may show the cause of your chest pain. You may be given contrast liquid to help your heart show up better in the pictures. Tell the healthcare provider if you have ever had an allergic reaction to contrast liquid. Do not enter the MRI room with anything metal. Metal can cause serious injury. Tell the provider if you have any metal in or on your body.    An endoscopy may be done to check for ulcers or problem with your esophagus.  Upper Endoscopy  How is chest pain treated?    Medicines may be given to treat the cause of your chest pain. Examples include pain medicine, anxiety medicine, or medicines to increase blood flow to your heart. Do not take certain medicines without asking your healthcare provider first. These include NSAIDs, herbal or vitamin supplements, and hormones, such as estrogen or progestin.    A stent may be placed if your chest pain is caused by blockage in your heart. A stent is a wire mesh tube that helps hold your artery open. You may need more than 1 stent.  Coronary Artery Angioplasty (Stent)  What are some healthy living tips? If the cause of your chest pain is known, your healthcare provider will give you specific guidelines to follow. The following are general healthy guidelines:    Do not smoke. Nicotine and other chemicals in cigarettes and cigars can cause lung and heart damage. Ask your healthcare provider for information if you currently smoke and need help to quit. E-cigarettes or smokeless tobacco still contain nicotine. Talk to your healthcare provider before you use these products.    Choose a variety of healthy foods as often as possible. Include fresh, frozen, or canned fruits and vegetables. Also include low-fat dairy products, fish, chicken (without skin), and lean meats. Your healthcare provider or a dietitian can help you create meal plans. You may need to avoid certain foods or drinks if your pain is caused by a digestion problem.  Healthy Foods      Lower your sodium (salt) intake. Limit foods that are high in sodium, such as canned foods, salty snacks, and cold cuts. If you add salt when you cook food, do not add more at the table. Choose low-sodium canned foods as much as possible.        Drink plenty of water every day. Water helps your body to control your temperature and blood pressure. Ask your healthcare provider how much water you should drink every day.    Ask about activity. Your healthcare provider will tell you which activities to limit or avoid. Ask when you can drive, return to work, and have sex. Ask about the best exercise plan for you.    Maintain a healthy weight. Ask your healthcare provider what a healthy weight is for you. Ask him or her to help you create a weight loss plan if you are overweight.    Ask about vaccines you may need. Your healthcare provider can tell you which vaccines you need, and when to get them. The following vaccines help prevent diseases that can become serious for a person with a heart condition:  The influenza (flu) vaccine is given each year. Get a flu vaccine as soon as recommended, usually in September or October.    The pneumonia vaccine is usually given every 5 years. Your healthcare provider may recommend the pneumonia vaccine if you are 65 or older.    COVID-19 vaccines are given to adults as a shot. At least 1 dose of an updated vaccine is recommended for all adults. COVID-19 vaccines are updated throughout the year. Adults 65 or older need a second dose of updated vaccine at least 4 months after the first dose. Your healthcare provider can help you schedule all needed doses as updated vaccines become available.  Prevent Heart Disease   Call your local emergency number (911 in the [place]) or have someone call if:    You have any of the following signs of a heart attack:  Squeezing, pressure, or pain in your chest    You may also have any of the following:  Discomfort or pain in your back, neck, jaw, stomach, or arm    Shortness of breath    Nausea or vomiting    Lightheadedness or a sudden cold sweat    When should I seek immediate care?    You have chest discomfort that gets worse, even with medicine.    You cough or vomit blood.    Your bowel movements are black or bloody.    You cannot stop vomiting, or it hurts to swallow.  When should I call my doctor?    You have questions or concerns about your condition or care.

## 2024-06-18 NOTE — ED PROVIDER NOTE - OBJECTIVE STATEMENT
48-year-old female past medical history of hypertension, gastroesophageal reflux disease, presenting with epigastric burning since Friday.  Patient reports having epigastric burning with pain radiating to bilateral upper quadrants.  Pain is constant, fluctuating intensity, describes retrosternal discomfort.  Denies alteration in taste sensation of mouth, nausea/vomiting, anorexia.  Also reports having multiple loose bowel movements improved on probiotics.  Denies fevers, chills, headaches, neck pain, shortness of breath, urinary complaints, skin/joint swellings. 48-year-old female past medical history of hypertension, gastroesophageal reflux disease, presenting with epigastric burning since Friday.  Patient reports having epigastric burning with pain radiating to bilateral upper quadrants.  Pain is constant, fluctuating intensity, describes retrosternal discomfort, worsening during recumbency.  Denies alteration in taste sensation of mouth, nausea/vomiting, anorexia.  Also reports having multiple loose bowel movements improved on probiotics.  Denies fevers, chills, headaches, neck pain, shortness of breath, urinary complaints, skin/joint swellings.

## 2024-06-18 NOTE — ED ADULT NURSE NOTE - OBJECTIVE STATEMENT
BOB RN: Pt received to room 14 a&ox4, ambulatory, on room air coming to ED c/o chest pain. Pt history of GERD, HTN. Pt endorsing epigastric burning since Friday. At home medications taken with no relief. Pt respirations even and unlabored, chest rise and fall equal b/l. Pt denies chest pain, HA, SOB, dizziness, N/V/D, fever/chills. Abdomen soft, round, nondistended. #20g placed to RAC, labs collected and sent. Pt pending XR. Stretcher in lowest position, call bell within reach, pt safety maintained.

## 2024-06-18 NOTE — ED ADULT TRIAGE NOTE - CHIEF COMPLAINT QUOTE
Pt c/o abd pain and chest pain X 3 days. Denies sob, n/v/d, fevers/chills. Hx: HTN, Diverticulitis, GERD

## 2024-06-18 NOTE — ED PROVIDER NOTE - CLINICAL SUMMARY MEDICAL DECISION MAKING FREE TEXT BOX
48-year-old female past medical history of hypertension, gastroesophageal reflux disease, presenting with epigastric burning since Friday. Hypertensive to 174/126 o/w HD stable. Unremarkable PE. EKG NSR - no ST/T changes. Likely GERD. Given patients gender/age/comorbids will consider r/o ACS.

## 2024-06-18 NOTE — ED PROVIDER NOTE - ATTENDING CONTRIBUTION TO CARE
Patient with history of GERD, hypertension presents emergency department with many days of intermittent epigastric pain and chest discomfort.  Has been taking antacid with some relief.  She does also note some chest discomfort with exertion, no chest pain, diaphoresis, palpitations, shortness of breath.  She has a normal EKG, abdomen is soft nontender.  Differential includes gastritis versus GERD versus possible ACS atypical presentation.  Plan for labs with troponin, chest x-ray will also give GI cocktail.

## 2024-06-18 NOTE — ED PROVIDER NOTE - PATIENT PORTAL LINK FT
You can access the FollowMyHealth Patient Portal offered by [facility] by registering at the following website: [email or web address]. By joining [employer]’s FollowMyHealth portal, you will also be able to view your health information using other applications (apps) compatible with our system.
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
## 2024-06-19 VITALS
OXYGEN SATURATION: 99 % | SYSTOLIC BLOOD PRESSURE: 159 MMHG | DIASTOLIC BLOOD PRESSURE: 100 MMHG | RESPIRATION RATE: 18 BRPM | TEMPERATURE: 98 F | HEART RATE: 98 BPM

## 2024-06-19 LAB
ALBUMIN SERPL ELPH-MCNC: 4 G/DL — SIGNIFICANT CHANGE UP (ref 3.3–5)
ALP SERPL-CCNC: 72 U/L — SIGNIFICANT CHANGE UP (ref 40–120)
ALT FLD-CCNC: 14 U/L — SIGNIFICANT CHANGE UP (ref 4–33)
ANION GAP SERPL CALC-SCNC: 10 MMOL/L — SIGNIFICANT CHANGE UP (ref 7–14)
AST SERPL-CCNC: 14 U/L — SIGNIFICANT CHANGE UP (ref 4–32)
BILIRUB SERPL-MCNC: 0.4 MG/DL — SIGNIFICANT CHANGE UP (ref 0.2–1.2)
BUN SERPL-MCNC: 17 MG/DL — SIGNIFICANT CHANGE UP (ref 7–23)
CALCIUM SERPL-MCNC: 9.1 MG/DL — SIGNIFICANT CHANGE UP (ref 8.4–10.5)
CHLORIDE SERPL-SCNC: 104 MMOL/L — SIGNIFICANT CHANGE UP (ref 98–107)
CO2 SERPL-SCNC: 24 MMOL/L — SIGNIFICANT CHANGE UP (ref 22–31)
CREAT SERPL-MCNC: 1 MG/DL — SIGNIFICANT CHANGE UP (ref 0.5–1.3)
EGFR: 70 ML/MIN/1.73M2 — SIGNIFICANT CHANGE UP
GLUCOSE SERPL-MCNC: 93 MG/DL — SIGNIFICANT CHANGE UP (ref 70–99)
HCG SERPL-ACNC: <1 MIU/ML — SIGNIFICANT CHANGE UP
LIDOCAIN IGE QN: 24 U/L — SIGNIFICANT CHANGE UP (ref 7–60)
POTASSIUM SERPL-MCNC: 3.4 MMOL/L — LOW (ref 3.5–5.3)
POTASSIUM SERPL-SCNC: 3.4 MMOL/L — LOW (ref 3.5–5.3)
PROT SERPL-MCNC: 7 G/DL — SIGNIFICANT CHANGE UP (ref 6–8.3)
SODIUM SERPL-SCNC: 138 MMOL/L — SIGNIFICANT CHANGE UP (ref 135–145)
TROPONIN T, HIGH SENSITIVITY RESULT: 7 NG/L — SIGNIFICANT CHANGE UP

## 2024-06-19 RX ORDER — FAMOTIDINE 10 MG/ML
1 INJECTION INTRAVENOUS
Qty: 28 | Refills: 0
Start: 2024-06-19 | End: 2024-07-02

## 2024-06-19 NOTE — ED ADULT NURSE REASSESSMENT NOTE - NS ED NURSE REASSESS COMMENT FT1
Pt received from FLOAT RN on stretcher, A/Ox4 answers all questions appropriately. Pt denies chest pain and SOB during reassessment, breathing is even and unlabored on room air. Abdomen is soft and non-distended. Pt ambulates independently at baseline, moves all four extremities on command, skin is intact. Pt resting comfortably at the bedside, No apparent acute visible distress noted on reassessment. Vital signs stable, NKA to medications. Pending results
break coverage rn. received report from CELIA cope. pt A&Ox4, vitals improved. MD made aware. endorses partial relief of chest pain/mid epigastric pain. Denies headache, dizziness, nausea, vomiting, headache. safety maintained. awaiting orders call bell within reach

## 2024-06-19 NOTE — ED ADULT NURSE REASSESSMENT NOTE - NSFALLUNIVINTERV_ED_ALL_ED
Bed/Stretcher in lowest position, wheels locked, appropriate side rails in place/Call bell, personal items and telephone in reach/Instruct patient to call for assistance before getting out of bed/chair/stretcher/Non-slip footwear applied when patient is off stretcher/[place] to call system/Physically safe environment - no spills, clutter or unnecessary equipment/Purposeful proactive rounding/Room/bathroom lighting operational, light cord in reach

## 2024-09-11 NOTE — ED ADULT TRIAGE NOTE - CCCP TRG CHIEF CMPLNT
67 y/o male with past medical history of  high cholesterol presents with upper abdominal pain since this morning following a protein shake. Pt says pain went away then returned. Pt denies nausea/vomiting, and had a normal bowel movement this morning, denies fever or urinary sx, never smoker, social drinker, Pain improved with Morphine, recurring. Suspect GS per US; adm  NPO, MRCP and surgical consult if + HTN/rash

## 2024-10-26 NOTE — ED ADULT NURSE REASSESSMENT NOTE - TEMPLATE LIST FOR HEAD TO TOE ASSESSMENT
02/15/2024    AST 19 02/15/2024    ALKPHOS 61 02/15/2024    BILITOT 0.5 02/15/2024     Review of Systems   Constitutional:  Negative for activity change, appetite change, fatigue, fever and unexpected weight change.   HENT:  Negative for congestion, rhinorrhea, sinus pressure and trouble swallowing.    Respiratory:  Negative for cough, chest tightness, shortness of breath and wheezing.    Cardiovascular:  Negative for chest pain, palpitations and leg swelling.   Gastrointestinal:  Negative for abdominal distention, abdominal pain, blood in stool, constipation, diarrhea, nausea and vomiting.   Genitourinary:  Negative for dysuria, frequency and hematuria.   Musculoskeletal:  Negative for arthralgias and back pain.   Skin:  Negative for rash.   Neurological:  Negative for dizziness, weakness, light-headedness, numbness and headaches.   Psychiatric/Behavioral:  Negative for agitation, decreased concentration, dysphoric mood, sleep disturbance and suicidal ideas. The patient is not nervous/anxious.         Wt Readings from Last 3 Encounters:   11/06/24 98.2 kg (216 lb 6.4 oz)   08/07/24 92.9 kg (204 lb 12.8 oz)   05/20/24 92.6 kg (204 lb 3.2 oz)       BP Readings from Last 3 Encounters:   11/06/24 138/62   08/07/24 120/60   05/20/24 130/86       Pulse Readings from Last 3 Encounters:   11/06/24 53   05/20/24 84   05/15/24 66       /62   Pulse 53   Temp 97.2 °F (36.2 °C)   Wt 98.2 kg (216 lb 6.4 oz)   SpO2 95%   BMI 29.35 kg/m²      Physical Exam  Vitals reviewed.   Constitutional:       General: He is awake. He is not in acute distress.     Appearance: He is overweight. He is not ill-appearing or diaphoretic.   HENT:      Head: Normocephalic and atraumatic. No abrasion or masses. Hair is normal.      Right Ear: External ear normal.      Left Ear: External ear normal.      Nose: Nose normal.   Eyes:      General: Lids are normal. Gaze aligned appropriately. No scleral icterus.        Right eye: No discharge.    General

## 2024-11-11 NOTE — ED ADULT NURSE NOTE - SUICIDE SCREENING QUESTION 3
Received request via: Pharmacy    Was the patient seen in the last year in this department? Yes    Does the patient have an active prescription (recently filled or refills available) for medication(s) requested? No    [facility]    Does the patient have nursing home Plus and need 100-day supply? (This applies to ALL medications) Patient does not have SCP  
No

## 2024-11-16 NOTE — ED ADULT TRIAGE NOTE - PAIN RATING/NUMBER SCALE (0-10): ACTIVITY
History of normocytic anemia  Hemoglobin relatively stable at 9.2  Maintained outpatient on iron every other day  Recheck hemoglobin postoperatively and consider IV iron while inpatient   5 (moderate pain) pain is controlled with meds.

## 2025-01-23 ENCOUNTER — OUTPATIENT (OUTPATIENT)
Dept: OUTPATIENT SERVICES | Facility: HOSPITAL | Age: 50
LOS: 1 days | End: 2025-01-23

## 2025-01-23 ENCOUNTER — LABORATORY RESULT (OUTPATIENT)
Age: 50
End: 2025-01-23

## 2025-01-23 ENCOUNTER — APPOINTMENT (OUTPATIENT)
Dept: INTERNAL MEDICINE | Facility: CLINIC | Age: 50
End: 2025-01-23
Payer: COMMERCIAL

## 2025-01-23 VITALS
HEIGHT: 57.28 IN | OXYGEN SATURATION: 99 % | DIASTOLIC BLOOD PRESSURE: 106 MMHG | SYSTOLIC BLOOD PRESSURE: 158 MMHG | HEART RATE: 89 BPM | WEIGHT: 179 LBS | BODY MASS INDEX: 38.62 KG/M2

## 2025-01-23 VITALS — OXYGEN SATURATION: 97 % | DIASTOLIC BLOOD PRESSURE: 108 MMHG | HEART RATE: 79 BPM | SYSTOLIC BLOOD PRESSURE: 138 MMHG

## 2025-01-23 DIAGNOSIS — I10 ESSENTIAL (PRIMARY) HYPERTENSION: ICD-10-CM

## 2025-01-23 DIAGNOSIS — E66.01 MORBID (SEVERE) OBESITY DUE TO EXCESS CALORIES: ICD-10-CM

## 2025-01-23 DIAGNOSIS — Z82.49 FAMILY HISTORY OF ISCHEMIC HEART DISEASE AND OTHER DISEASES OF THE CIRCULATORY SYSTEM: ICD-10-CM

## 2025-01-23 DIAGNOSIS — Z80.0 FAMILY HISTORY OF MALIGNANT NEOPLASM OF DIGESTIVE ORGANS: ICD-10-CM

## 2025-01-23 DIAGNOSIS — G47.39 OTHER SLEEP APNEA: ICD-10-CM

## 2025-01-23 DIAGNOSIS — F41.9 ANXIETY DISORDER, UNSPECIFIED: ICD-10-CM

## 2025-01-23 LAB
ALBUMIN SERPL ELPH-MCNC: 4.3 G/DL
ALP BLD-CCNC: 84 U/L
ALT SERPL-CCNC: 8 U/L
ANION GAP SERPL CALC-SCNC: 10 MMOL/L
AST SERPL-CCNC: 12 U/L
BILIRUB SERPL-MCNC: 0.3 MG/DL
BUN SERPL-MCNC: 11 MG/DL
CALCIUM SERPL-MCNC: 9.4 MG/DL
CHLORIDE SERPL-SCNC: 99 MMOL/L
CHOLEST SERPL-MCNC: 162 MG/DL
CO2 SERPL-SCNC: 28 MMOL/L
CREAT SERPL-MCNC: 0.9 MG/DL
EGFR: 79 ML/MIN/1.73M2
GLUCOSE SERPL-MCNC: 97 MG/DL
HDLC SERPL-MCNC: 53 MG/DL
LDLC SERPL CALC-MCNC: 93 MG/DL
NONHDLC SERPL-MCNC: 109 MG/DL
POTASSIUM SERPL-SCNC: 3.6 MMOL/L
PROT SERPL-MCNC: 7.1 G/DL
SODIUM SERPL-SCNC: 137 MMOL/L
TRIGL SERPL-MCNC: 84 MG/DL
TSH SERPL-ACNC: 1.37 UIU/ML

## 2025-01-23 PROCEDURE — G2211 COMPLEX E/M VISIT ADD ON: CPT

## 2025-01-23 PROCEDURE — 99203 OFFICE O/P NEW LOW 30 MIN: CPT

## 2025-01-23 RX ORDER — AMLODIPINE BESYLATE 5 MG/1
5 TABLET ORAL
Qty: 90 | Refills: 3 | Status: ACTIVE | COMMUNITY
Start: 2025-01-23 | End: 1900-01-01

## 2025-01-24 LAB
APPEARANCE: CLEAR
BILIRUBIN URINE: NEGATIVE
BLOOD URINE: ABNORMAL
COLOR: YELLOW
ESTIMATED AVERAGE GLUCOSE: 108 MG/DL
GLUCOSE QUALITATIVE U: NEGATIVE MG/DL
HBA1C MFR BLD HPLC: 5.4 %
HCT VFR BLD CALC: 42.6 %
HGB BLD-MCNC: 14 G/DL
KETONES URINE: NEGATIVE MG/DL
LEUKOCYTE ESTERASE URINE: NEGATIVE
MCHC RBC-ENTMCNC: 28.8 PG
MCHC RBC-ENTMCNC: 32.9 G/DL
MCV RBC AUTO: 87.7 FL
NITRITE URINE: NEGATIVE
PH URINE: 6.5
PLATELET # BLD AUTO: 418 K/UL
PROTEIN URINE: NEGATIVE MG/DL
RBC # BLD: 4.86 M/UL
RBC # FLD: 12.3 %
SPECIFIC GRAVITY URINE: 1.02
UROBILINOGEN URINE: 0.2 MG/DL
WBC # FLD AUTO: 4.9 K/UL

## 2025-01-27 DIAGNOSIS — E66.01 MORBID (SEVERE) OBESITY DUE TO EXCESS CALORIES: ICD-10-CM

## 2025-01-27 DIAGNOSIS — Z82.49 FAMILY HISTORY OF ISCHEMIC HEART DISEASE AND OTHER DISEASES OF THE CIRCULATORY SYSTEM: ICD-10-CM

## 2025-01-27 DIAGNOSIS — G47.39 OTHER SLEEP APNEA: ICD-10-CM

## 2025-01-27 DIAGNOSIS — Z80.0 FAMILY HISTORY OF MALIGNANT NEOPLASM OF DIGESTIVE ORGANS: ICD-10-CM

## 2025-01-27 DIAGNOSIS — I10 ESSENTIAL (PRIMARY) HYPERTENSION: ICD-10-CM

## 2025-02-05 ENCOUNTER — APPOINTMENT (OUTPATIENT)
Dept: BARIATRICS/WEIGHT MGMT | Facility: CLINIC | Age: 50
End: 2025-02-05

## 2025-02-13 ENCOUNTER — TRANSCRIPTION ENCOUNTER (OUTPATIENT)
Age: 50
End: 2025-02-13

## 2025-02-25 ENCOUNTER — NON-APPOINTMENT (OUTPATIENT)
Age: 50
End: 2025-02-25

## 2025-02-25 ENCOUNTER — APPOINTMENT (OUTPATIENT)
Dept: INTERNAL MEDICINE | Facility: CLINIC | Age: 50
End: 2025-02-25
Payer: COMMERCIAL

## 2025-02-25 ENCOUNTER — RESULT CHARGE (OUTPATIENT)
Age: 50
End: 2025-02-25

## 2025-02-25 ENCOUNTER — OUTPATIENT (OUTPATIENT)
Dept: OUTPATIENT SERVICES | Facility: HOSPITAL | Age: 50
LOS: 1 days | End: 2025-02-25

## 2025-02-25 VITALS
OXYGEN SATURATION: 99 % | HEIGHT: 57.28 IN | DIASTOLIC BLOOD PRESSURE: 133 MMHG | BODY MASS INDEX: 37.76 KG/M2 | WEIGHT: 175 LBS | HEART RATE: 88 BPM | SYSTOLIC BLOOD PRESSURE: 189 MMHG

## 2025-02-25 VITALS — DIASTOLIC BLOOD PRESSURE: 133 MMHG | SYSTOLIC BLOOD PRESSURE: 182 MMHG

## 2025-02-25 DIAGNOSIS — E66.01 MORBID (SEVERE) OBESITY DUE TO EXCESS CALORIES: ICD-10-CM

## 2025-02-25 DIAGNOSIS — I10 ESSENTIAL (PRIMARY) HYPERTENSION: ICD-10-CM

## 2025-02-25 PROCEDURE — 99213 OFFICE O/P EST LOW 20 MIN: CPT

## 2025-02-25 RX ORDER — AMLODIPINE BESYLATE 10 MG/1
10 TABLET ORAL
Qty: 30 | Refills: 0 | Status: ACTIVE | COMMUNITY
Start: 2025-02-25 | End: 1900-01-01

## 2025-03-03 DIAGNOSIS — I10 ESSENTIAL (PRIMARY) HYPERTENSION: ICD-10-CM

## 2025-03-03 DIAGNOSIS — E66.01 MORBID (SEVERE) OBESITY DUE TO EXCESS CALORIES: ICD-10-CM

## 2025-03-07 ENCOUNTER — APPOINTMENT (OUTPATIENT)
Dept: INTERNAL MEDICINE | Facility: CLINIC | Age: 50
End: 2025-03-07

## 2025-03-18 ENCOUNTER — APPOINTMENT (OUTPATIENT)
Dept: INTERNAL MEDICINE | Facility: CLINIC | Age: 50
End: 2025-03-18

## 2025-03-19 NOTE — ED ADULT TRIAGE NOTE - ESI TRIAGE ACUITY LEVEL, MLM
----- Message from Denae sent at 3/19/2025 12:07 PM CDT -----  .Who Called: Lucila Huizar is requesting to schedule their annual mammogram appointment. Order is not listed in Epic. Please enter order and contact patient to schedule.Where would they like the mammogram performed? [facility] Contact: Phone CallPatient's Preferred Phone Number on File: [phone] Best Call Back Number, if different:Additional Information: N/A   3

## 2025-03-20 ENCOUNTER — APPOINTMENT (OUTPATIENT)
Dept: INTERNAL MEDICINE | Facility: CLINIC | Age: 50
End: 2025-03-20

## 2025-04-09 ENCOUNTER — APPOINTMENT (OUTPATIENT)
Dept: INTERNAL MEDICINE | Facility: CLINIC | Age: 50
End: 2025-04-09

## 2025-04-11 NOTE — ED ADULT NURSE NOTE - CCCP TRG CHIEF CMPLNT
[facility] EMERGENCY DEPARTMENT  EMERGENCY DEPARTMENT ENCOUNTER      Pt Name: Mya Richardson  MRN: [id number]  Birthdate 1996  Date of evaluation: 2025  Provider: Freedom Kelly MD  PCP: Ruslan Strauss DO  Note Started: 3:16 AM EDT 25    CHIEF COMPLAINT       Chief Complaint   Patient presents with    Abdominal Pain     RUQ and RLQ pain started 2245 yesterday sharp achy pain pt states she took two pregnancy tests Tuesday and they came back positive so she did not take anything for pain       HISTORY OF PRESENT ILLNESS: 1 or more Elements   History From: Patient  Limitations to history : None    Mya Richardson is a 28 y.o.  female who presents brought in from home with complaints of right-sided abdominal pain onset 1045 yesterday evening.  Describes pain as colicky unable to find a comfortable position, described as sharp and aching, worse in the right upper and right lower quadrants.  Previous surgical abdominal history of  section.  Reports she did take 2 at home pregnancy test on Tuesday both of which were positive.  She has not yet followed up with an obstetrician though she reports she does have an upcoming appointment.  Reports he is not taking anything for the pain at this time.  Denies previous episodes of this presentation.  Denies significant vaginal bleeding or discharge.  Denies active emesis.  Currently denies emesis, objective fevers or chills, chest pain or pressure, dizziness, dysuria, diarrhea, constipation.    Nursing Notes were all reviewed and agreed with or any disagreements were addressed in the HPI.    REVIEW OF SYSTEMS :    Positives and Pertinent negatives as per HPI.     PAST MEDICAL HISTORY/Chronic Conditions Affecting Care    has a past medical history of Chronic fatigue, Class 3 severe obesity due to excess calories without serious comorbidity with body mass index (BMI) of 50.0 to 59.9 in adult, Hip dysplasia, Hip pain, chronic,  HTN/rash

## 2025-05-04 ENCOUNTER — EMERGENCY (EMERGENCY)
Facility: HOSPITAL | Age: 50
LOS: 1 days | End: 2025-05-04
Attending: STUDENT IN AN ORGANIZED HEALTH CARE EDUCATION/TRAINING PROGRAM | Admitting: STUDENT IN AN ORGANIZED HEALTH CARE EDUCATION/TRAINING PROGRAM
Payer: COMMERCIAL

## 2025-05-04 VITALS
WEIGHT: 173.94 LBS | SYSTOLIC BLOOD PRESSURE: 182 MMHG | DIASTOLIC BLOOD PRESSURE: 107 MMHG | TEMPERATURE: 98 F | HEIGHT: 58 IN | HEART RATE: 101 BPM | RESPIRATION RATE: 16 BRPM | OXYGEN SATURATION: 98 %

## 2025-05-04 VITALS
HEART RATE: 76 BPM | DIASTOLIC BLOOD PRESSURE: 99 MMHG | RESPIRATION RATE: 18 BRPM | TEMPERATURE: 98 F | OXYGEN SATURATION: 97 % | SYSTOLIC BLOOD PRESSURE: 186 MMHG

## 2025-05-04 LAB
ALBUMIN SERPL ELPH-MCNC: 4.2 G/DL — SIGNIFICANT CHANGE UP (ref 3.3–5)
ALP SERPL-CCNC: 78 U/L — SIGNIFICANT CHANGE UP (ref 40–120)
ALT FLD-CCNC: 17 U/L — SIGNIFICANT CHANGE UP (ref 4–33)
ANION GAP SERPL CALC-SCNC: 13 MMOL/L — SIGNIFICANT CHANGE UP (ref 7–14)
AST SERPL-CCNC: 21 U/L — SIGNIFICANT CHANGE UP (ref 4–32)
BASOPHILS # BLD AUTO: 0.1 K/UL — SIGNIFICANT CHANGE UP (ref 0–0.2)
BASOPHILS NFR BLD AUTO: 1.6 % — SIGNIFICANT CHANGE UP (ref 0–2)
BILIRUB SERPL-MCNC: 0.4 MG/DL — SIGNIFICANT CHANGE UP (ref 0.2–1.2)
BUN SERPL-MCNC: 22 MG/DL — SIGNIFICANT CHANGE UP (ref 7–23)
CALCIUM SERPL-MCNC: 9.7 MG/DL — SIGNIFICANT CHANGE UP (ref 8.4–10.5)
CHLORIDE SERPL-SCNC: 103 MMOL/L — SIGNIFICANT CHANGE UP (ref 98–107)
CO2 SERPL-SCNC: 24 MMOL/L — SIGNIFICANT CHANGE UP (ref 22–31)
CREAT SERPL-MCNC: 0.84 MG/DL — SIGNIFICANT CHANGE UP (ref 0.5–1.3)
EGFR: 86 ML/MIN/1.73M2 — SIGNIFICANT CHANGE UP
EGFR: 86 ML/MIN/1.73M2 — SIGNIFICANT CHANGE UP
EOSINOPHIL # BLD AUTO: 0.14 K/UL — SIGNIFICANT CHANGE UP (ref 0–0.5)
EOSINOPHIL NFR BLD AUTO: 2.2 % — SIGNIFICANT CHANGE UP (ref 0–6)
GLUCOSE SERPL-MCNC: 85 MG/DL — SIGNIFICANT CHANGE UP (ref 70–99)
HCG SERPL-ACNC: 1.1 MIU/ML — SIGNIFICANT CHANGE UP
HCT VFR BLD CALC: 40.4 % — SIGNIFICANT CHANGE UP (ref 34.5–45)
HGB BLD-MCNC: 13.7 G/DL — SIGNIFICANT CHANGE UP (ref 11.5–15.5)
IANC: 4.42 K/UL — SIGNIFICANT CHANGE UP (ref 1.8–7.4)
IMM GRANULOCYTES NFR BLD AUTO: 0.5 % — SIGNIFICANT CHANGE UP (ref 0–0.9)
LIDOCAIN IGE QN: 26 U/L — SIGNIFICANT CHANGE UP (ref 7–60)
LYMPHOCYTES # BLD AUTO: 0.97 K/UL — LOW (ref 1–3.3)
LYMPHOCYTES # BLD AUTO: 15.2 % — SIGNIFICANT CHANGE UP (ref 13–44)
MCHC RBC-ENTMCNC: 29.3 PG — SIGNIFICANT CHANGE UP (ref 27–34)
MCHC RBC-ENTMCNC: 33.9 G/DL — SIGNIFICANT CHANGE UP (ref 32–36)
MCV RBC AUTO: 86.3 FL — SIGNIFICANT CHANGE UP (ref 80–100)
MONOCYTES # BLD AUTO: 0.72 K/UL — SIGNIFICANT CHANGE UP (ref 0–0.9)
MONOCYTES NFR BLD AUTO: 11.3 % — SIGNIFICANT CHANGE UP (ref 2–14)
NEUTROPHILS # BLD AUTO: 4.42 K/UL — SIGNIFICANT CHANGE UP (ref 1.8–7.4)
NEUTROPHILS NFR BLD AUTO: 69.2 % — SIGNIFICANT CHANGE UP (ref 43–77)
NRBC # BLD AUTO: 0 K/UL — SIGNIFICANT CHANGE UP (ref 0–0)
NRBC # FLD: 0 K/UL — SIGNIFICANT CHANGE UP (ref 0–0)
NRBC BLD AUTO-RTO: 0 /100 WBCS — SIGNIFICANT CHANGE UP (ref 0–0)
PLATELET # BLD AUTO: 313 K/UL — SIGNIFICANT CHANGE UP (ref 150–400)
POTASSIUM SERPL-MCNC: 3.8 MMOL/L — SIGNIFICANT CHANGE UP (ref 3.5–5.3)
POTASSIUM SERPL-SCNC: 3.8 MMOL/L — SIGNIFICANT CHANGE UP (ref 3.5–5.3)
PROT SERPL-MCNC: 7.5 G/DL — SIGNIFICANT CHANGE UP (ref 6–8.3)
RBC # BLD: 4.68 M/UL — SIGNIFICANT CHANGE UP (ref 3.8–5.2)
RBC # FLD: 12.5 % — SIGNIFICANT CHANGE UP (ref 10.3–14.5)
SODIUM SERPL-SCNC: 140 MMOL/L — SIGNIFICANT CHANGE UP (ref 135–145)
WBC # BLD: 6.38 K/UL — SIGNIFICANT CHANGE UP (ref 3.8–10.5)
WBC # FLD AUTO: 6.38 K/UL — SIGNIFICANT CHANGE UP (ref 3.8–10.5)

## 2025-05-04 PROCEDURE — 93010 ELECTROCARDIOGRAM REPORT: CPT

## 2025-05-04 PROCEDURE — 99284 EMERGENCY DEPT VISIT MOD MDM: CPT

## 2025-05-04 RX ORDER — ACETAMINOPHEN 500 MG/5ML
1000 LIQUID (ML) ORAL ONCE
Refills: 0 | Status: COMPLETED | OUTPATIENT
Start: 2025-05-04 | End: 2025-05-04

## 2025-05-04 RX ORDER — LIDOCAINE HYDROCHLORIDE 20 MG/ML
0 JELLY TOPICAL
Refills: 0
Start: 2025-05-04

## 2025-05-04 RX ORDER — ONDANSETRON HCL/PF 4 MG/2 ML
4 VIAL (ML) INJECTION ONCE
Refills: 0 | Status: COMPLETED | OUTPATIENT
Start: 2025-05-04 | End: 2025-05-04

## 2025-05-04 RX ORDER — MAGNESIUM, ALUMINUM HYDROXIDE 200-200 MG
30 TABLET,CHEWABLE ORAL ONCE
Refills: 0 | Status: COMPLETED | OUTPATIENT
Start: 2025-05-04 | End: 2025-05-04

## 2025-05-04 RX ORDER — MAG HYDROX/ALUMINUM HYD/SIMETH 400-400-40
10 SUSPENSION, ORAL (FINAL DOSE FORM) ORAL
Qty: 30 | Refills: 0
Start: 2025-05-04 | End: 2025-05-06

## 2025-05-04 RX ADMIN — Medication 20 MILLIGRAM(S): at 17:05

## 2025-05-04 RX ADMIN — Medication 400 MILLIGRAM(S): at 17:04

## 2025-05-04 RX ADMIN — Medication 1000 MILLILITER(S): at 17:06

## 2025-05-04 RX ADMIN — Medication 30 MILLILITER(S): at 17:05

## 2025-05-04 RX ADMIN — Medication 4 MILLIGRAM(S): at 17:06

## 2025-05-04 NOTE — ED ADULT NURSE NOTE - NSFALLUNIVINTERV_ED_ALL_ED
Bed/Stretcher in lowest position, wheels locked, appropriate side rails in place/Call bell, personal items and telephone in reach/Instruct patient to call for assistance before getting out of bed/chair/stretcher/Non-slip footwear applied when patient is off stretcher/[place] to call system/Physically safe environment - no spills, clutter or unnecessary equipment/Purposeful proactive rounding/Room/bathroom lighting operational, light cord in reach

## 2025-05-04 NOTE — ED ADULT TRIAGE NOTE - CHIEF COMPLAINT QUOTE
pt c/o abd cramps with dark stools and pains in upper abd area . has not had period for 2 months pt c/o abd cramps with dark stools and pains in upper abd area . has not had period for 2 months  denies dizziness

## 2025-05-04 NOTE — ED ADULT NURSE NOTE - CHIEF COMPLAINT QUOTE
pt c/o abd cramps with dark stools and pains in upper abd area . has not had period for 2 months  denies dizziness

## 2025-05-04 NOTE — ED PROVIDER NOTE - NSFOLLOWUPINSTRUCTIONS_ED_ALL_ED_FT
The results of any blood tests and imaging studies completed during your visit today were discussed and explained to you and a copy provided with your discharge instructions.     Please follow up with a GI specialist within 1-2weeks.    GERD (Gastroesophageal Reflux Disease)    WHAT YOU NEED TO KNOW:    What is gastroesophageal reflux disease (GERD)? GERD is reflux that happens more than 2 times a week for a few weeks. Reflux means acid and food in your stomach back up into your esophagus. GERD can cause other health problems over time if it is not treated.  Digestive Tract    What causes GERD? GERD often happens because the lower muscle (sphincter) of the esophagus does not close properly. The sphincter normally opens to let food into the stomach. It then closes to keep food and stomach acid in the stomach. If the sphincter does not close properly, stomach acid and food back up (reflux) into the esophagus. The following may increase your risk for GERD:    Certain foods such as spicy foods, chocolate, foods that contain caffeine, peppermint, and fried foods    Hiatal hernia    Certain medicines such as calcium channel blockers (used to treat high blood pressure), allergy medicines, sedatives, or antidepressants    Pregnancy, obesity, or scleroderma    Lying down after a meal    Drinking alcohol or smoking cigarettes  What are the signs and symptoms of GERD?    Heartburn (burning pain in your chest)    Pain after meals that spreads to your neck, jaw, or shoulder    Pain that gets better when you change positions    Bitter or acid taste in your mouth    A dry cough    Trouble swallowing or pain with swallowing    Hoarseness or a sore throat    Burping or hiccups    Feeling full soon after you start eating  How is GERD diagnosed? Your healthcare provider will ask about your symptoms and when they started. Tell your provider about other medical conditions you have, your eating habits, and your activities. You may also need any of the following:    An esophageal pH test measures the amount of stomach acid that reaches your esophagus. A small probe is used to check the amount. The probe may stay attached to the catheter. If so, the catheter is taped to your nose to hold it in place. Sometimes the probe is wireless, so the catheter is removed after the probe is placed.  Esophageal pH Test      An endoscopy is a procedure used to look at the inside of your esophagus and stomach. An endoscope is a bendable tube with a light and camera on the end. Your healthcare provider may remove a small sample of tissue and send it to a lab for tests.  Upper Endoscopy      X-ray pictures may be taken of your stomach and intestines (bowel). You may be given a chalky liquid to drink before the pictures are taken. This liquid helps your stomach and intestines show up better on the x-rays.    Pressure and function tests of your esophagus can help find problems such as a hiatal hernia.  How is GERD treated?    Medicines are used to decrease stomach acid. Medicine may also be used to help your lower esophageal sphincter and stomach contract (tighten) more.    Surgery is done to wrap the upper part of the stomach around the esophageal sphincter. This will strengthen the sphincter and prevent reflux.  How can I manage GERD?  Prevent GERD    Do not have foods or drinks that may increase heartburn. These include chocolate, peppermint, fried or fatty foods, drinks that contain caffeine, or carbonated drinks (soda). Other foods include spicy foods, onions, tomatoes, and tomato-based foods. Do not have foods or drinks that can irritate your esophagus, such as citrus fruits, juices, and alcohol.    Do not eat large meals. When you eat a lot of food at one time, your stomach needs more acid to digest it. Eat 6 small meals each day instead of 3 large ones, and eat slowly. Do not eat meals 2 to 3 hours before bedtime.    Elevate the head of your bed. Place 6-inch blocks under the head of your bed frame. You may also use more than one pillow under your head and shoulders while you sleep.    Maintain a healthy weight. If you are overweight, weight loss may help relieve symptoms of GERD.    Do not smoke. Smoking weakens the lower esophageal sphincter and increases the risk of GERD. Ask your healthcare provider for information if you currently smoke and need help to quit. E-cigarettes or smokeless tobacco still contain nicotine. Talk to your healthcare provider before you use these products.    Do not put pressure on your abdomen. Pressure pushes acid up into your esophagus. Do not wear clothing that is tight around your waist. Do not bend over. Bend at the knees if you need to pick something up.  Call your local emergency number (911 in the [place]) if:    You have severe chest pain and sudden trouble breathing.    When should I seek immediate care?    You have trouble breathing after you vomit.    You have trouble swallowing, or pain with swallowing.    Your bowel movements are black, bloody, or tarry-looking.    Your vomit looks like coffee grounds or has blood in it.  When should I call my doctor?    You feel full and cannot burp or vomit.    You vomit large amounts, or you vomit often.    You are losing weight without trying.    Your symptoms get worse or do not improve with treatment.    You have questions or concerns about your condition or care.  CARE AGREEMENT:    You have the right to help plan your care. Learn about your health condition and how it may be treated. Discuss treatment options with your healthcare providers to decide what care you want to receive. You always have the right to refuse treatment.

## 2025-05-04 NOTE — ED PROVIDER NOTE - CLINICAL SUMMARY MEDICAL DECISION MAKING FREE TEXT BOX
Pt's  vitals are stable, nonactionable at this time.  Low suspicion for ACS, patient symptoms are most likely related to GERD.  Will give symptomatic care in ED with IV fluids, Pepcid, Maalox, Zofran and reassess and p.o. challenge.  If lab work is within normal limits will DC home with GI follow-up.

## 2025-05-04 NOTE — ED PROVIDER NOTE - OBJECTIVE STATEMENT
49-year-old female patient past medical history hypertension, current presents to the ED for diffuse abdominal pain with burning in mid chest up to throat especially after eating.  Patient has history of GERD but stopped taking her daily meds when pain resolves.  Denies chest pain, no shortness of breath, no fevers, no vomiting, no diarrhea.  Patient reports nausea.  Patient also has been taking Pepto-Bismol and reports dark brown stools.  Patient also states she has not had a menstrual in 2 months which is abnormal for her.

## 2025-05-04 NOTE — ED PROVIDER NOTE - PATIENT PORTAL LINK FT
You can access the FollowMyHealth Patient Portal offered by [facility] by registering at the following website: [email or web address]. By joining [employer]’s FollowMyHealth portal, you will also be able to view your health information using other applications (apps) compatible with our system.

## 2025-05-04 NOTE — ED ADULT NURSE NOTE - OBJECTIVE STATEMENT
Patient A&o X4, history of HTN, received in room 14, with complaints of abdominal pain/blood in stool. Patient states, "I have not have a period in two months and now I have been having a lot of issues". Patient complains of midsternal chest pain, epigastric pain and lower abdominal pain rating 6 out of 10 currently. Patient also complains of nausea and blood in stool x 3 days, denies any hematuria or hemoptysis. Patient able to speak in clear sentences, respirations equal and unlabored. Lung sounds clear b/l, equal chest rise and fall noted. Denies SOB, fever, chills, and diarrhea at this time. Skin warm and dry. Provider at bedside for eval, pending further orders. Patient A&o X4, history of HTN, received in room 14, with complaints of abdominal pain/blood in stool. Patient states, "I have not have a period in two months and now I have been having a lot of issues". Patient complains of midsternal chest pain, epigastric pain and lower abdominal pain rating 6 out of 10 currently. Patient also complains of nausea and blood in stool x 3 days, denies any hematuria or hemoptysis. Patient noted to be hypertensive during assessment, denies any dizziness or lightheadedness at this time. Patient able to speak in clear sentences, respirations equal and unlabored. Lung sounds clear b/l, equal chest rise and fall noted. Denies SOB, fever, chills, and diarrhea at this time. Skin warm and dry. Provider at bedside for eval, pending further orders.

## 2025-05-04 NOTE — ED PROVIDER NOTE - ATTENDING CONTRIBUTION TO CARE
49-year-old female with a past medical history of GERD, hypertension who presents to the ED complaining of epigastric chest pain that radiates along her mid sternum for the past 3 days.  She is been taking Pepto-Bismol, baking soda with relief.  She also states that today she started Prilosec.  She still feels like her usual GERD.  Additionally she also notes pelvic cramping, she has not had a period in 2 months.  Patient also notes dark stool that she noticed yesterday.    She had a colonoscopy performed about 4 years ago which was "normal".  She has 1 scheduled in May 2025.    Physical exam:  Overall well-appearing female in no acute distress  Heart is regular rate and rhythm without murmur rubs or gallops  Lungs are clear to auscultation lungs without wheezing rales or rhonchi  Abdomen is soft, nontender, nondistended without rebound or guarding, no pulsatile masses  2+ PT DP and radial pulses    MDM:  Patient presents ED due to epigastric abdominal pain consistent with acid reflux.  Vital signs show that she is tachycardic to 104, hypertensive 186/100.  Patient is visibly anxious.    Will repeat vital signs.    Will obtain EKG.    Will give GI cocktail and reassess.  Will also obtain CBC, CMP, hCG, lipase and urinalysis.    Regarding patient's dark stool I suspect some Pepto-Bismol but she has an appointment coming up this month with GI.  If she is not anemic and her workup is unremarkable patient will be discharged with outpatient follow-up

## 2025-05-27 NOTE — ED ADULT NURSE NOTE - [id number]_ED_A_ED
Inpatient Diabetes Management Service: Daily Progress Note     HPI: 65 female with past medical history of DM, anxiety, tobacco use, polysubstance use presented 5/6/25 with headaches and vision changes ongoing for 3 to 4 weeks.  Patient hospitalized x 2 at outside hospital for similar symptoms and workup include MRI brain with a working diagnosis of possible PRES. She was admitted to [facility] 5/6 with worsening headache and visual disturbance (blurry vision, vague visual distortions, hallucinations, palinopsia, this time without evidence of hypertension.  MRI brain revealed new, extensive leptomeningeal enhancement, sulcal FLAIR signal, and microbleeds /superficial siderosis in the bilateral parieto-occipital areas.  CSF 5/12 revealed lymphocytic pleocytosis (32), elevated protein (75), but studies were otherwise negative including MEP and flow cytometry/cytology.  Serum inflammatory workup was pursued in consultation with rheumatology, and is so far been negative.  CT C/A/P nondiagnostic.  CTA and DSA have showed no evidence of vasculopathy. 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      The evening of 5/21, apparently due to multiple frustrations about her prolonged hospitalization, she discharged AMA. She re-presented later in the night 5/21 requesting re-admission with no new symptoms. Inpatient Diabetes Service consulted 5/21/25 for assistance with glycemic control with steroid use.         Assessment/Plan:     Assessment:   Type 2 Diabetes Mellitus, without known complications. Sub-optimal control  (A1c 8.7%, Hgb: normal)  New onset headache with intermittent vision disturbances and migrainous features  Intracerebral microbleeds with diffuse leptomeningeal enhancement   Steroid induced hyperglycemia  BMI: Body  "mass index is 37.2 kg/m .    Plan/Recommendations:   -  NPH 24 units q 24 hrs at 0800 with prednisone 60 mg po daily. Hold if prednisone on hold.    - Adjust Novolog Meal Coverage:   1 units per 12 g CHO--->  1 units 10g of carb with breakfast 1 units per 12 g CHO--->  1 units 10g of carb with   lunch  1 units per 15 g CHO with dinner  1 units per 20 g CHO PRN with snacks/supplements ([id number])  1 units per 50 g CHO PRN with snacks/supplements ([id number])  - Novolog Correction Scale: 1:25>140 TID AC (high resistance), 1:50>200 HS, 0200 (medium resistance)   - BG monitoring: TID AC, HS, 0200   - Hypoglycemia protocol    - Carb counting protocol     Discussion:   No change to NPH today.  Increase carb coverage before breakfast and lunch.  BG before lunch taken < 2 hours after eating so falsely high.  Put in another pharmacy liaison consult to see since she has left sided weakness in left arm that giving insulin with vial and syringe would be difficult.  Discharge Planning: (tentative) --> plan for ARU (either [place] or [place])  Medications: TBD. Ideally NPH + Novolog set dose/correction (see below for current d/c plan without NPH). 5/20: Discussed CGM, she would be interested in this (can be pursued outpatient or at the end of her ARU stay)   Test Claims: completed 5/19--> PA for NPH flexpens DENIED 5/21 (has to use vials).     Education: will be needed if discharging home  Outpatient Follow-up: recommend PCP    Diabetes Management Discharge Plan (tentative; will continue to adjust daily)  Instructions to patient were posted in AVS and discussed on day of discharge.   Medications and supplies are to be ordered by primary service on discharge.   *please use the DIAB non-branded discharge supply order set ([id number])*     Patient will need the following supplies prescribed:   Novolog Flexpens  \"BD\" (32G x 4mm) insulin pen needles  Glucometer (if brand of meter not known, can be ordered \"no brand specified\" and note " No "to pharmacy: \"can substitute per insurance coverage\")  Lancets  Test strips  Sharps container  Alcohol swabs      Blood glucose monitoring: Three times daily before meals, and at bedtime.     Blood Glucose (BG) goals:  mg/dL before meals     Glucose Control Regimen:     1) Rapid-acting insulin: aspart (Novolog) carbohydrate coverage/mealtime insulin (if taking prednisone 60 mg in the morning)  Take 8 units before average meals that contain carbohydrates                     OR  Take 4 units before small meals or snacks that contain carbohydrates     2) Rapid-acting insulin: aspart (Novolog) correction - see chart below. Take for high blood glucoses three times daily before meals  Add the correction dose to the carbohydrate coverage/mealtime insulin dose and give in one injection--ideally 10-15 minutes before a meal.  You may take the correction dose even if you skip a meal (as long as it has been 4 hours since previous correction dose).      Pre-meal correction scale:     Blood Glucose Insulin Novolog Before Meals:   Less than 140 0 units   140 -164  1 units   165 -189 2 units   190 - 214 3 units   215 - 239 4 units    240 - 264  5 units   265 - 289 6 units   290 - 314 7 units   315 - 339 8 units   340 - 364 9 units   365 or more 10 units         Outpatient Diabetes Follow-Up: Follow up with your Primary Care Provider (PCP) in 1-2 weeks, bring glucose data to your appointment. Follow up sooner if blood glucose runs consistently greater than 200 mg/dL or if having more than two episodes less than 70 mg/dL.     Your target A1c value is less than 7% to help prevent future complications from diabetes. Your most recent A1c is 8.7%.      Thank you for letting the Diabetes Management Team be involved in your care!    Please notify Inpatient Diabetes Service if changes are planned to steroids, nutrition, TPN/TF and anticipated procedures requiring prolonged NPO status.         Interval History/Review of Systems :   The " last 24 hours progress and nursing notes reviewed.  - No acute events overnight.   - Frequent snacking  - emesis last 2 days ago  Sometimes nausea      Planned Procedures/Surgeries: none    Inpatient Glucose Control:       Recent Labs   Lab 05/27/25  1157 05/27/25  0820 05/27/25  0631 05/27/25  0204 05/26/25  2220 05/26/25  1647   * 123* 107* 106* 146* 269*             Medications Impacting Glycemia:   Steroids:    5/16 - 5/20: Methylprednisolone 1,000 mg daily at 0900   5/21-present: Prednisone 60 mg daily x 1 month, then tentatively taper by 10 mg every week pending results of brain MRI   D5W containing solutions/medications: none  Other medications impacting glucose: none        Nutrition:   Orders Placed This Encounter      Consistent Carbohydrate Diet Moderate Consistent Carb (60 g CHO per Meal) Diet    Supplements: none   TF: none   TPN: none         Diabetes History: see full consult note for complete diabetes history   Diabetes Type and Duration: T2DM, per patient was told she has T2DM 2 weeks ago at OSH. Per chart review, diagnosed 4/2021 with A1c 6.7%      GAD65 antibody, zinc transporter 8 antibody, islet antibody, insulin antibody, and c-peptide not available on epic search      PTA Medication Regimen: none; discharged 5/21 AMA with recommendations of Novolog 10 units before regular meals, 5 units with small meals/snacks plus correction 1:25>140 TID AC with Prednisone 60mg daily.      Missing doses?: did not take any insulin after leaving hospital 5/21     Historical Diabetes Medications:   - reports taking Metformin 500 mg BID recently at OSH but wasn't taking at home. Has never been on insulin before.       Glucose monitoring device/frequency/trends: has used glucometer randomly before because she has family members with diabetes.     Hypoglycemia PTA: none     Outpatient Diabetes Provider: Has PCP but hasn't seen him since 2021 - Randal Castellanos (with [place])      Formal Diabetes  Education/Educator: no        Physical Exam:   /63 (BP Location: Right arm)   Pulse 65   Temp 99.5  F (37.5  C) (Oral)   Resp 16   SpO2 98%   Constitutional: well-appearing patient in no acute distress.  Eyes: sclera anicteric.  Respiratory: No signs of labored breathing. Remains on room air  Left arm: with very weak ,         Data:     Lab Results   Component Value Date    A1C 8.7 (H) 05/06/2025    A1C 6.0 (H) 02/11/2022    A1C 6.6 (H) 10/15/2021    A1C 5.8 (H) 09/06/2021    A1C 6.7 (H) 04/19/2021    A1C 5.0 02/11/2015       ROUTINE IP LABS (Last four results)  BMP  Recent Labs   Lab 05/27/25  1157 05/27/25  0820 05/27/25  0631 05/27/25  0204 05/26/25  0822 05/26/25  0650 05/25/25  1100 05/25/25  0624 05/24/25  0838 05/24/25  0621   NA  --   --  144  --   --  142  --  142  --  144   POTASSIUM  --   --  4.0  --   --  3.7  --  3.9  --  3.9   CHLORIDE  --   --  107  --   --  104  --  106  --  106   CRISTIAN  --   --  8.3*  --   --  8.3*  --  7.8*  --  7.6*   CO2  --   --  25  --   --  27  --  27  --  24   BUN  --   --  20.8  --   --  16.0  --  15.5  --  17.7   CR  --   --  0.71  --   --  0.61  --  0.60  --  0.74   * 123* 107* 106*   < > 104*   < > 124*   < > 260*    < > = values in this interval not displayed.     CBC  Recent Labs   Lab 05/27/25  0631 05/26/25  0650 05/25/25  0624 05/24/25  0621   WBC 13.8* 11.1* 10.9 8.8   RBC 4.50 4.18 4.25 4.16   HGB 11.9 11.1* 11.1* 10.9*   HCT 37.6 35.2 35.7 35.1   MCV 84 84 84 84   MCH 26.4* 26.6 26.1* 26.2*   MCHC 31.6 31.5 31.1* 31.1*   RDW 16.0* 15.9* 15.7* 15.9*    [phone]     INR  No lab results found in last 7 days.      Inpatient Diabetes Service will continue to follow, please don't hesitate to contact the team with any questions or concerns.     Marta Mckinley PA-C  Inpatient Diabetes Service  [phone]  Available by [facility]  Date of Service: 5/27/2025      Plan discussed with patient, bedside RN, and primary team via this note.    To  contact Inpatient Diabetes Service:     7 AM - 5 PM: Page the IDS ELBERT following the patient that day (see filed or incomplete progress notes/consult notes under Endocrinology)    OR if uncertain of provider assignment: page job code [phone]    5 PM - 7 AM: First call after hours is to primary service.    For urgent after-hours questions, page job code for on call fellow: [phone]     I spent a total of 40 minutes on the date of the encounter doing prep/post-work, chart review, history and exam, documentation and further activities per the note including lab review, multidisciplinary communication, counseling the patient and/or coordinating care regarding acute hyper/hypoglycemic management, as well as discharge management and planning/communication.